# Patient Record
Sex: FEMALE | Race: WHITE | NOT HISPANIC OR LATINO | Employment: OTHER | URBAN - METROPOLITAN AREA
[De-identification: names, ages, dates, MRNs, and addresses within clinical notes are randomized per-mention and may not be internally consistent; named-entity substitution may affect disease eponyms.]

---

## 2019-09-25 ENCOUNTER — TELEPHONE (OUTPATIENT)
Dept: SCHEDULING | Facility: IMAGING CENTER | Age: 59
End: 2019-09-25

## 2019-09-30 ENCOUNTER — OFFICE VISIT (OUTPATIENT)
Dept: MEDICAL GROUP | Facility: MEDICAL CENTER | Age: 59
End: 2019-09-30
Payer: COMMERCIAL

## 2019-09-30 VITALS
BODY MASS INDEX: 34.31 KG/M2 | RESPIRATION RATE: 14 BRPM | TEMPERATURE: 98.2 F | OXYGEN SATURATION: 97 % | DIASTOLIC BLOOD PRESSURE: 82 MMHG | SYSTOLIC BLOOD PRESSURE: 136 MMHG | HEART RATE: 78 BPM | WEIGHT: 201 LBS | HEIGHT: 64 IN

## 2019-09-30 DIAGNOSIS — Z12.31 ENCOUNTER FOR SCREENING MAMMOGRAM FOR BREAST CANCER: ICD-10-CM

## 2019-09-30 DIAGNOSIS — E66.9 OBESITY (BMI 30.0-34.9): ICD-10-CM

## 2019-09-30 DIAGNOSIS — Z11.59 NEED FOR HEPATITIS C SCREENING TEST: ICD-10-CM

## 2019-09-30 DIAGNOSIS — E03.8 OTHER SPECIFIED HYPOTHYROIDISM: ICD-10-CM

## 2019-09-30 DIAGNOSIS — Z23 NEED FOR VACCINATION: ICD-10-CM

## 2019-09-30 DIAGNOSIS — Z13.21 ENCOUNTER FOR VITAMIN DEFICIENCY SCREENING: ICD-10-CM

## 2019-09-30 DIAGNOSIS — Z98.890 HISTORY OF GASTRIC SURGERY: ICD-10-CM

## 2019-09-30 DIAGNOSIS — Z13.220 SCREENING, LIPID: ICD-10-CM

## 2019-09-30 DIAGNOSIS — Z80.3 FAMILY HISTORY OF BREAST CANCER IN SISTER: ICD-10-CM

## 2019-09-30 DIAGNOSIS — E55.9 VITAMIN D DEFICIENCY: ICD-10-CM

## 2019-09-30 PROCEDURE — 99203 OFFICE O/P NEW LOW 30 MIN: CPT | Performed by: NURSE PRACTITIONER

## 2019-09-30 RX ORDER — LEVOTHYROXINE SODIUM 0.12 MG/1
TABLET ORAL
Refills: 0 | COMMUNITY
Start: 2019-08-26 | End: 2019-09-30 | Stop reason: SDUPTHER

## 2019-09-30 RX ORDER — LEVOTHYROXINE SODIUM 0.12 MG/1
TABLET ORAL
Qty: 90 TAB | Refills: 1 | Status: SHIPPED | OUTPATIENT
Start: 2019-09-30 | End: 2019-10-21

## 2019-09-30 SDOH — HEALTH STABILITY: MENTAL HEALTH: HOW MANY STANDARD DRINKS CONTAINING ALCOHOL DO YOU HAVE ON A TYPICAL DAY?: 1 OR 2

## 2019-09-30 ASSESSMENT — PATIENT HEALTH QUESTIONNAIRE - PHQ9: CLINICAL INTERPRETATION OF PHQ2 SCORE: 0

## 2019-09-30 NOTE — LETTER
[facility]  ARTIS Glez.  [address]  [place] [address]  Fax: [phone]   Authorization for Release/Disclosure of   Protected Health Information   Name: BENJAMIN MAYNARD : 1960 SSN: [id number]   Address: [address] [place] [address]   [place] [address] Phone:    [phone] (home)    I authorize the entity listed below to release/disclose the PHI below to:   [facility]/RAYA Glez and RAYA Glez   Provider or Entity Name:  BERTHA SOFIA JALILIE MD - [facility], [address] [address] Phone:  [phone]    Fax:  [phone]    Reason for request: continuity of care   Information to be released:    [  ] LAST COLONOSCOPY,  including any PATH REPORT and follow-up  [  ] LAST FIT/COLOGUARD RESULT [  ] LAST DEXA  [  ] LAST MAMMOGRAM  [  ] LAST PAP  [  ] LAST LABS [  ] RETINA EXAM REPORT  [  ] IMMUNIZATION RECORDS  [X] Release all info: Continuity of Care      [  ] Check here and initial the line next to each item to release ALL health information INCLUDING  _____ Care and treatment for drug and / or alcohol abuse  _____ HIV testing, infection status, or AIDS  _____ Genetic Testing    DATES OF SERVICE OR TIME PERIOD TO BE DISCLOSED: _____________  I understand and acknowledge that:  * This Authorization may be revoked at any time by you in writing, except if your health information has already been used or disclosed.  * Your health information that will be used or disclosed as a result of you signing this authorization could be re-disclosed by the recipient. If this occurs, your re-disclosed health information may no longer be protected by State or Federal laws.  * You may refuse to sign this Authorization. Your refusal will not affect your ability to obtain treatment.  * This Authorization becomes effective upon signing and will  on (date) __________.      If no date is indicated, this  Authorization will  one (1) year from the signature date.    Name: Fatoumata Redding    Signature:   Date:     2019       PLEASE FAX REQUESTED RECORDS BACK TO: [phone]

## 2019-09-30 NOTE — PROGRESS NOTES
"CC: Establish Care (Medications needed)        HPI:     Fatoumata presents today for the following:  Patient here to establish care.  Transfer from [place]  All problems are new to me today  Patient's past medical, family, and social history reviewed and placed in Epic today. Immunizations reviewed. Prescriptions-reviewed and updated PRN.    1. Other specified hypothyroidism  Currently is on 125 mcg dose.  States may be around 4 months ago her dose had been increased slightly because her TSH was \"sluggish\".  She does not remember the lab value when she does not have these with her currently.  She does however have access to them over her electronic records.    2. History of gastric surgery  Initially had a lap band placed and then removed.  Approximately in 2013 she then underwent a gastric bypass.  She is been stable with this.  She has had issues with anemia, low iron and low vitamin D.  She denies that she has a vitamin levels monitored routinely or that she takes vitamins routinely.    3. . Vitamin D deficiency  See above.  Not on any current supplementation over-the-counter or otherwise      9. Obesity (BMI 30.0-34.9)  Status post 2 bariatric surgeries.    Current Outpatient Medications   Medication Sig Dispense Refill   • levothyroxine (SYNTHROID) 125 MCG Tab TK 1 T PO QAM OES 90 Tab 1     No current facility-administered medications for this visit.      Social History     Tobacco Use   • Smoking status: Never Smoker   • Smokeless tobacco: Never Used   Substance Use Topics   • Alcohol use: Yes     Drinks per session: 1 or 2     Comment: 1 glass wine/week   • Drug use: Never     I reviewed patients allergies, problem list and medications today in [facility].    ROS: Any/all pertinent positives listed in the HPI, otherwise all others reviewed are negative today.      /82 (BP Location: Left arm, Patient Position: Sitting, BP Cuff Size: Adult)   Pulse 78   Temp 36.8 °C (98.2 °F) (Temporal)   Resp 14   Ht 1.621 m (5' " "3.8\")   Wt 91.2 kg (201 lb)   SpO2 97%   BMI 34.72 kg/m²     Exam:    Gen: Alert and oriented, No apparent distress. WDWN  Psych: A+Ox3, normal affect and mood  Skin: Warm, dry and intact. Good turgor   No rashes in visible areas.  Eye: Conjunctiva clear, lids normal  ENMT: Lips without lesions, good dentition  Neck: No Lymphadenopathy, Thyromegaly, Bruits.   Trachea midline, no masses  Lungs: Clear to auscultation bilaterally, no rales or rhonchi   Unlabored respiratory effort.   CV: Regular rate and rhythm, S1, S2. No murmurs.   No Edema        Assessment and Plan.   59 y.o. female with the following issues.    1. Other specified hypothyroidism  Sound like she is due for recheck after thyroid adjustment.  Lab ordered.  We are requesting records from her previous PCP  - Obtain Results: Other (see comment); Obtain Results From: Other (see comment)  - TSH; Future    2. History of gastric surgery/Encounter for vitamin deficiency screening/ Vitamin D deficiency  Stable.  We will monitor her levels  - VITAMIN B12; Future  - FERRITIN; Future  - CBC WITH DIFFERENTIAL; Future    3. Need for hepatitis C screening test  Ordered routinely  - HEP C VIRUS ANTIBODY; Future    6. Need for vaccination  Declines flu    7. Encounter for screening mammogram for breast cancer  Ordered for February 2020  - MA-SCREEN MAMMO W/CAD-BILAT; Future    8. Obesity (BMI 30.0-34.9)  Patient is status post 2 bariatric surgeries.  SITY COUNSELING (No Charge): Patient identified as having weight management issue.  Appropriate orders and counseling given.          "

## 2019-10-15 ENCOUNTER — APPOINTMENT (OUTPATIENT)
Dept: LAB | Facility: MEDICAL CENTER | Age: 59
End: 2019-10-15
Attending: NURSE PRACTITIONER
Payer: COMMERCIAL

## 2019-10-21 ENCOUNTER — TELEPHONE (OUTPATIENT)
Dept: MEDICAL GROUP | Facility: MEDICAL CENTER | Age: 59
End: 2019-10-21

## 2019-10-21 DIAGNOSIS — E03.8 OTHER SPECIFIED HYPOTHYROIDISM: ICD-10-CM

## 2019-10-21 DIAGNOSIS — Z98.890 HISTORY OF GASTRIC SURGERY: ICD-10-CM

## 2019-10-21 DIAGNOSIS — Z13.21 ENCOUNTER FOR VITAMIN DEFICIENCY SCREENING: ICD-10-CM

## 2019-10-21 RX ORDER — LEVOTHYROXINE SODIUM 137 UG/1
137 TABLET ORAL
Qty: 90 TAB | Refills: 0 | Status: SHIPPED | OUTPATIENT
Start: 2019-10-21 | End: 2020-01-20

## 2019-10-22 NOTE — TELEPHONE ENCOUNTER
Please make sure patient is on my chart which is copied below    Fatoumata I did receive a copy of your most recent request labs which showed your thyroid is currently out of range.  Your blood cell panel and vitamin levels are all normal.    This likely means we need to increase your current levothyroxine dose.  I see that you are currently on 125 mcg daily.  I will increase to 137 mcg daily.  New prescription will be sent in.  We will need to recheck your thyroid level in 6 to 8 weeks after increasing this dose.  A lab slip will be mailed to you for that to use at [facility].      Levothyroxine 137 mcg sent to pharmacy.  TSH ordered, needs to be mailed to patient as she uses [facility].  Please attach my business card and [facility] codes to that lab order

## 2020-01-20 ENCOUNTER — TELEPHONE (OUTPATIENT)
Dept: MEDICAL GROUP | Facility: MEDICAL CENTER | Age: 60
End: 2020-01-20

## 2020-01-20 DIAGNOSIS — E03.8 OTHER SPECIFIED HYPOTHYROIDISM: ICD-10-CM

## 2020-01-20 RX ORDER — LEVOTHYROXINE SODIUM 137 UG/1
TABLET ORAL
Qty: 90 TAB | Refills: 0 | Status: SHIPPED | OUTPATIENT
Start: 2020-01-20 | End: 2020-02-06

## 2020-01-21 NOTE — TELEPHONE ENCOUNTER
Please call patient.  She is due to recheck her TSH.  Refill of thyroid medication sent, TSH lab pending.

## 2020-02-05 ENCOUNTER — PATIENT MESSAGE (OUTPATIENT)
Dept: MEDICAL GROUP | Facility: MEDICAL CENTER | Age: 60
End: 2020-02-05

## 2020-02-05 ENCOUNTER — TELEPHONE (OUTPATIENT)
Dept: MEDICAL GROUP | Facility: MEDICAL CENTER | Age: 60
End: 2020-02-05

## 2020-02-05 DIAGNOSIS — E03.8 OTHER SPECIFIED HYPOTHYROIDISM: ICD-10-CM

## 2020-02-05 NOTE — TELEPHONE ENCOUNTER
Please call patient  pls confirm she is taking 137 mcg of levothyroxine (and no longer taking 125mcg)  Her thyroid level is improved but not my much.  If she is on the 137 for the last 6+ weeks, then I want to increase further to 150mcg and recheck in 2 months.  Please mail her the lab order as she uses [facility].  TSH ordered, will wait to confirm she is on correct dose for correct amount of time before sending any RX changes    My chart sent as well

## 2020-02-05 NOTE — TELEPHONE ENCOUNTER
Patient viewed lab results & message. Sending mychart to get confirmation of medication dosage she is taking.

## 2020-02-06 RX ORDER — LEVOTHYROXINE SODIUM 0.15 MG/1
150 TABLET ORAL
Qty: 90 TAB | Refills: 0 | Status: SHIPPED | OUTPATIENT
Start: 2020-02-06 | End: 2020-05-05

## 2020-02-19 ENCOUNTER — OFFICE VISIT (OUTPATIENT)
Dept: URGENT CARE | Facility: CLINIC | Age: 60
End: 2020-02-19
Payer: COMMERCIAL

## 2020-02-19 VITALS
DIASTOLIC BLOOD PRESSURE: 100 MMHG | OXYGEN SATURATION: 98 % | TEMPERATURE: 98.8 F | WEIGHT: 206 LBS | HEIGHT: 63 IN | SYSTOLIC BLOOD PRESSURE: 140 MMHG | BODY MASS INDEX: 36.5 KG/M2 | HEART RATE: 92 BPM

## 2020-02-19 DIAGNOSIS — J40 BRONCHITIS: ICD-10-CM

## 2020-02-19 DIAGNOSIS — L28.2 PRURITIC RASH: ICD-10-CM

## 2020-02-19 PROCEDURE — 99203 OFFICE O/P NEW LOW 30 MIN: CPT | Performed by: PHYSICIAN ASSISTANT

## 2020-02-19 RX ORDER — CODEINE PHOSPHATE/GUAIFENESIN 10-100MG/5
5 LIQUID (ML) ORAL
Qty: 50 ML | Refills: 0 | Status: SHIPPED | OUTPATIENT
Start: 2020-02-19 | End: 2020-02-27

## 2020-02-19 RX ORDER — METHYLPREDNISOLONE 4 MG/1
TABLET ORAL
Qty: 21 TAB | Refills: 0 | Status: SHIPPED | OUTPATIENT
Start: 2020-02-19 | End: 2020-02-27

## 2020-02-19 RX ORDER — DIPHENHYDRAMINE HCL 25 MG
25 TABLET ORAL EVERY 6 HOURS PRN
COMMUNITY
End: 2020-02-28

## 2020-02-19 RX ORDER — BENZONATATE 100 MG/1
100 CAPSULE ORAL 3 TIMES DAILY PRN
Qty: 30 CAP | Refills: 0 | Status: SHIPPED | OUTPATIENT
Start: 2020-02-19 | End: 2020-02-27

## 2020-02-19 RX ORDER — HYDROXYZINE HYDROCHLORIDE 25 MG/1
25 TABLET, FILM COATED ORAL 3 TIMES DAILY PRN
Qty: 30 TAB | Refills: 0 | Status: SHIPPED | OUTPATIENT
Start: 2020-02-19 | End: 2020-02-27

## 2020-02-19 ASSESSMENT — ENCOUNTER SYMPTOMS
ABDOMINAL PAIN: 0
EYE PAIN: 0
HEADACHES: 0
COUGH: 1
SORE THROAT: 0
DIARRHEA: 0
NAUSEA: 0
SPUTUM PRODUCTION: 1
DIZZINESS: 0
EYE REDNESS: 0
VOMITING: 0
PALPITATIONS: 0
WHEEZING: 1
SHORTNESS OF BREATH: 0
CHILLS: 0
NECK PAIN: 0
FEVER: 0
MYALGIAS: 1
SINUS PAIN: 0

## 2020-02-19 NOTE — PROGRESS NOTES
Subjective:      Fatoumata Redding is a 60 y.o. female who presents with URI (cough, now have hives all over, taking benadryl, need cough med., itchi all over body)            HPI  60-year-old female presents urgent care with new problem of dry cough, congestion, and mild wheezing onset 4 days ago.  Patient reports she has been taking previously prescribed promethazine and codeine for cough on Sunday and developed a full body rash Monday.  She reports associated itching, denies pain.  She has been taking 50 mg of Benadryl 4 times a day with minimal symptomatic relief.  She denies previous history of reaction to this medication.  She denies new laundry detergent, clothing, household products/chemicals, or known exposure to allergens.    Review of Systems   Constitutional: Positive for malaise/fatigue. Negative for chills and fever.   HENT: Positive for congestion. Negative for ear pain, sinus pain and sore throat.    Eyes: Negative for pain and redness.   Respiratory: Positive for cough, sputum production and wheezing. Negative for shortness of breath.    Cardiovascular: Negative for chest pain and palpitations.   Gastrointestinal: Negative for abdominal pain, diarrhea, nausea and vomiting.   Genitourinary: Negative for dysuria.   Musculoskeletal: Positive for myalgias. Negative for neck pain.   Skin: Positive for itching and rash.   Neurological: Negative for dizziness and headaches.   Endo/Heme/Allergies: Negative for environmental allergies.     Past Medical History:   Diagnosis Date   • Hyperlipidemia     in her 20s   • Hypertension     one meds at one point prior to weight loss surgery   • palpitations     resolved with weight loss surgery   • Thyroid disease      Current Outpatient Medications on File Prior to Visit   Medication Sig Dispense Refill   • diphenhydrAMINE (BENADRYL) 25 MG Tab Take 25 mg by mouth every 6 hours as needed for Sleep.     • levothyroxine (SYNTHROID) 150 MCG Tab Take 1 Tab by mouth Every  "morning on an empty stomach. 90 Tab 0     No current facility-administered medications on file prior to visit.      Allergies   Allergen Reactions   • Tape      Paper tape,other tapes give her rash   • Latex Rash     Latex gloves only-rash  Latex gloves only-rash  Latex gloves only-rash       Social History     Tobacco Use   • Smoking status: Never Smoker   • Smokeless tobacco: Never Used   Substance Use Topics   • Alcohol use: Yes     Drinks per session: 1 or 2     Comment: 1 glass wine/week      Objective:     /100 (BP Location: Left arm, Patient Position: Sitting, BP Cuff Size: Large adult)   Pulse 92   Temp 37.1 °C (98.8 °F) (Temporal)   Ht 1.6 m (5' 3\")   Wt 93.4 kg (206 lb)   SpO2 98%   BMI 36.49 kg/m²      Physical Exam  Vitals signs reviewed.   Constitutional:       General: She is not in acute distress.     Appearance: Normal appearance. She is well-developed. She is not ill-appearing.   HENT:      Head: Normocephalic and atraumatic.      Right Ear: Tympanic membrane and ear canal normal.      Left Ear: Tympanic membrane and ear canal normal.      Nose: Mucosal edema and congestion present.      Mouth/Throat:      Mouth: Mucous membranes are moist.      Pharynx: Oropharynx is clear.   Eyes:      Extraocular Movements: Extraocular movements intact.      Conjunctiva/sclera: Conjunctivae normal.   Neck:      Musculoskeletal: Normal range of motion and neck supple.   Cardiovascular:      Rate and Rhythm: Normal rate and regular rhythm.      Heart sounds: Normal heart sounds.   Pulmonary:      Effort: Pulmonary effort is normal. No respiratory distress.      Breath sounds: Rhonchi present.   Musculoskeletal: Normal range of motion.   Skin:     General: Skin is warm and dry.      Capillary Refill: Capillary refill takes less than 2 seconds.      Findings: No rash.   Neurological:      General: No focal deficit present.      Mental Status: She is alert and oriented to person, place, and time. "   Psychiatric:         Mood and Affect: Mood normal.         Behavior: Behavior normal.         Thought Content: Thought content normal.         Judgment: Judgment normal.                 Assessment/Plan:     1. Bronchitis  benzonatate (TESSALON) 100 MG Cap    guaifenesin-codeine (TUSSI-ORGANIDIN NR) 100-10 MG/5ML syrup   2. Pruritic rash  methylPREDNISolone (MEDROL DOSEPAK) 4 MG Tablet Therapy Pack    hydrOXYzine HCl (ATARAX) 25 MG Tab     Advised patient to discontinue promethazine codeine cough syrup.   Prescribed patient guaifenesin codeine to trial.  Patient advised to stop taking this medication if rash persists or worsens.  Patient denies associated stridor, difficulty breathing, or shortness of breath.  PT should follow up with PCP in 1-2 days for re-evaluation if symptoms have not improved.  Discussed red flags and reasons to return to UC or ED.  Pt and/or family verbalized understanding of diagnosis and follow up instructions and was offered informational handout on diagnosis.  PT discharged.     Your blood pressure is elevated here in Urgent Care. Please monitor your blood pressure over the next several days. If your blood pressure continues to be 120/80 or higher please contact your physician for blood pressure management.

## 2020-02-20 RX ORDER — AZITHROMYCIN 250 MG/1
TABLET, FILM COATED ORAL
Qty: 6 TAB | Refills: 0 | Status: SHIPPED | OUTPATIENT
Start: 2020-02-20 | End: 2020-02-27

## 2020-02-27 ENCOUNTER — OFFICE VISIT (OUTPATIENT)
Dept: URGENT CARE | Facility: CLINIC | Age: 60
End: 2020-02-27
Payer: COMMERCIAL

## 2020-02-27 VITALS
WEIGHT: 206 LBS | SYSTOLIC BLOOD PRESSURE: 102 MMHG | HEIGHT: 63 IN | OXYGEN SATURATION: 96 % | BODY MASS INDEX: 36.5 KG/M2 | TEMPERATURE: 98.5 F | DIASTOLIC BLOOD PRESSURE: 72 MMHG | HEART RATE: 85 BPM | RESPIRATION RATE: 17 BRPM

## 2020-02-27 DIAGNOSIS — T78.40XA ALLERGIC REACTION, INITIAL ENCOUNTER: ICD-10-CM

## 2020-02-27 PROCEDURE — 99214 OFFICE O/P EST MOD 30 MIN: CPT | Performed by: NURSE PRACTITIONER

## 2020-02-27 RX ORDER — DIPHENHYDRAMINE HYDROCHLORIDE 50 MG/ML
50 INJECTION INTRAMUSCULAR; INTRAVENOUS ONCE
Status: DISCONTINUED | OUTPATIENT
Start: 2020-02-27 | End: 2020-02-27

## 2020-02-27 RX ORDER — DIPHENHYDRAMINE HYDROCHLORIDE 50 MG/ML
50 INJECTION INTRAMUSCULAR; INTRAVENOUS ONCE
Status: COMPLETED | OUTPATIENT
Start: 2020-02-27 | End: 2020-02-27

## 2020-02-27 RX ADMIN — DIPHENHYDRAMINE HYDROCHLORIDE 50 MG: 50 INJECTION INTRAMUSCULAR; INTRAVENOUS at 10:17

## 2020-02-27 ASSESSMENT — ENCOUNTER SYMPTOMS
HEADACHES: 0
SORE THROAT: 0
CONSTIPATION: 0
DIZZINESS: 0
FEVER: 0
MYALGIAS: 0
TINGLING: 0
PALPITATIONS: 0
SHORTNESS OF BREATH: 0
WHEEZING: 0
CHILLS: 0
BACK PAIN: 0
ORTHOPNEA: 0
NAUSEA: 0
VOMITING: 0
HEARTBURN: 0
MEMORY LOSS: 0
COUGH: 0
DIARRHEA: 0

## 2020-02-27 NOTE — PROGRESS NOTES
"Subjective:      Fatoumata Redding is a 60 y.o. female who presents with Rash (rashes all over body , medications have not been working for patient , this is a follow up from last visit ) and Ear Fullness (BILATERAL EAR PAIN / ITCHY )            Itchy red bumps and welts started 3 days ago at abdomen and extended to entire body. Sometimes better without any intervention. Worse after hot shower. Mouth, hands, feet and genitalia are spared. No blisters or pustules. Rash is not painful. Not precipitated by sun exposure.  She has just finished a Z-Daryl yesterday.  She does have a subjective history of multiple medication reactions especially to steroids.  No topical or other home treatment tried.   No new lotion, soap, detergent, sheets, towels, topical or oral medicine.   No similarly affected contacts. No pets in household. No known insect infestations in household.    ROS:  No mouth or lip redness, itching, blisters, or swelling  No difficulty swallowing or breathing.  No cough or wheeze.           Review of Systems   Constitutional: Negative for chills and fever.   HENT: Negative for ear pain and sore throat.    Respiratory: Negative for cough, shortness of breath and wheezing.    Cardiovascular: Negative for chest pain, palpitations, orthopnea and leg swelling.   Gastrointestinal: Negative for constipation, diarrhea, heartburn, nausea and vomiting.   Musculoskeletal: Negative for back pain, joint pain and myalgias.   Skin: Positive for itching and rash ( generalized).   Neurological: Negative for dizziness, tingling and headaches.   Psychiatric/Behavioral: Negative for memory loss and suicidal ideas.   All other systems reviewed and are negative.         Objective:     /72 (BP Location: Left arm, Patient Position: Sitting, BP Cuff Size: Adult)   Pulse 85   Temp 36.9 °C (98.5 °F) (Temporal)   Resp 17   Ht 1.6 m (5' 3\")   Wt 93.4 kg (206 lb)   SpO2 96%   BMI 36.49 kg/m²      Physical Exam  Vitals signs " reviewed.   Constitutional:       General: She is not in acute distress.     Appearance: Normal appearance.   HENT:      Head: Normocephalic and atraumatic.      Right Ear: External ear normal.      Left Ear: External ear normal.   Eyes:      Extraocular Movements: Extraocular movements intact.   Neck:      Musculoskeletal: Normal range of motion and neck supple.   Cardiovascular:      Rate and Rhythm: Normal rate and regular rhythm.      Pulses: Normal pulses.      Heart sounds: No friction rub. No gallop.    Pulmonary:      Effort: Pulmonary effort is normal. No respiratory distress.      Breath sounds: Normal breath sounds.   Abdominal:      General: Bowel sounds are normal. There is no distension.      Palpations: Abdomen is soft. There is no mass.   Musculoskeletal: Normal range of motion.         General: No tenderness.      Right lower leg: No edema.      Left lower leg: No edema.   Skin:     General: Skin is warm and dry.      Findings: Rash present. Rash is urticarial.      Comments: Generalized urticarial rash throughout body including face but sparing palms and soles.   Neurological:      Mental Status: She is alert and oriented to person, place, and time.   Psychiatric:         Mood and Affect: Mood normal.                 Assessment/Plan:       1. Allergic reaction, initial encounter  We have discussed that although we cannot confirm with 100% certainty that this is likely a reaction to the Z-Daryl she finished yesterday and may not clear her system for several more days.  She has history of sensitivity to medications.  She has been instructed on emergency room precautions in depth for any lip swelling, shortness of breath or worsening symptoms.  We have given her a Benadryl injection in the office today and she will continue with over-the-counter Benadryl.  She declines any steroid medications as she states she has an allergy to all steroids.  She is going to follow-up with her PCP to discuss further  allergy testing.  All questions answered.  Patient verbalizes understanding and has no additional questions today and is in agreement with treatment plan.  - diphenhydrAMINE (BENADRYL) injection 50 mg

## 2020-02-28 ENCOUNTER — OFFICE VISIT (OUTPATIENT)
Dept: URGENT CARE | Facility: CLINIC | Age: 60
End: 2020-02-28
Payer: COMMERCIAL

## 2020-02-28 VITALS
TEMPERATURE: 97.9 F | HEART RATE: 74 BPM | HEIGHT: 63 IN | DIASTOLIC BLOOD PRESSURE: 76 MMHG | SYSTOLIC BLOOD PRESSURE: 124 MMHG | BODY MASS INDEX: 37.21 KG/M2 | OXYGEN SATURATION: 98 % | WEIGHT: 210 LBS | RESPIRATION RATE: 14 BRPM

## 2020-02-28 DIAGNOSIS — L28.2 PRURITIC RASH: ICD-10-CM

## 2020-02-28 PROCEDURE — 99214 OFFICE O/P EST MOD 30 MIN: CPT | Performed by: PHYSICIAN ASSISTANT

## 2020-02-28 RX ORDER — HYDROXYZINE PAMOATE 25 MG/1
25 CAPSULE ORAL 3 TIMES DAILY PRN
Qty: 30 CAP | Refills: 0 | Status: SHIPPED | OUTPATIENT
Start: 2020-02-28 | End: 2020-03-05

## 2020-02-28 ASSESSMENT — ENCOUNTER SYMPTOMS
CHILLS: 0
SHORTNESS OF BREATH: 0
FEVER: 0
PALPITATIONS: 0

## 2020-02-28 NOTE — PROGRESS NOTES
Subjective:   Fatoumata Redding is a 60 y.o. female who presents today with   Chief Complaint   Patient presents with   • Facial Swelling     facial and hand swelling still not going down   • Rash     rash all over body after taking zpack       Rash   This is a new problem. The current episode started in the past 7 days. The problem has been gradually improving since onset. The rash is diffuse. The rash is characterized by itchiness and redness. Associated with: Z-pack. Pertinent negatives include no fever or shortness of breath. Treatments tried: Benadryl. The treatment provided mild relief. Her past medical history is significant for allergies.     Patient states she noticed some improvement from the Benadryl injection yesterday but states she still has persistent rash especially on her arms and back.  PMH:  has a past medical history of Hyperlipidemia, Hypertension, palpitations, and Thyroid disease. She also has no past medical history of Asthma, Blood transfusion without reported diagnosis, Cancer (HCC), CHF (congestive heart failure) (HCC), Clotting disorder (HCC), COPD (chronic obstructive pulmonary disease) (HCC), Diabetes (HCC), Diabetic neuropathy (HCC), Encounter for long-term (current) use of other medications, Goiter, Heart attack (HCC), Heart murmur, Kidney disease, Pulmonary emphysema (HCC), Seizure (HCC), Stroke (HCC), Tuberculosis, or Urinary tract infection.  MEDS:   Current Outpatient Medications:   •  hydrOXYzine pamoate (VISTARIL) 25 MG Cap, Take 1 Cap by mouth 3 times a day as needed for Itching., Disp: 30 Cap, Rfl: 0  •  levothyroxine (SYNTHROID) 150 MCG Tab, Take 1 Tab by mouth Every morning on an empty stomach., Disp: 90 Tab, Rfl: 0  ALLERGIES:   Allergies   Allergen Reactions   • Azithromycin      Rash   • Tape      Paper tape,other tapes give her rash   • Latex Rash     Latex gloves only-rash  Latex gloves only-rash  Latex gloves only-rash       SURGHX:   Past Surgical History:   Procedure  "Laterality Date   • ABDOMINAL EXPLORATION  2013    gastric bypass   • ABDOMINAL EXPLORATION  2011    lap band   • CHOLECYSTECTOMY     • PRIMARY C SECTION      c/s x 2     SOCHX:  reports that she has never smoked. She has never used smokeless tobacco. She reports current alcohol use. She reports that she does not use drugs.  FH: Reviewed with patient, not pertinent to this visit.       Review of Systems   Constitutional: Negative for chills and fever.   Respiratory: Negative for shortness of breath.    Cardiovascular: Negative for chest pain and palpitations.   Skin: Positive for itching and rash.   All other systems reviewed and are negative.       Objective:   /76 (BP Location: Left arm, Patient Position: Sitting, BP Cuff Size: Large adult)   Pulse 74   Temp 36.6 °C (97.9 °F) (Temporal)   Resp 14   Ht 1.6 m (5' 3\")   Wt 95.3 kg (210 lb)   SpO2 98%   BMI 37.20 kg/m²   Physical Exam  Vitals signs and nursing note reviewed.   Constitutional:       General: She is not in acute distress.     Appearance: Normal appearance. She is well-developed and normal weight. She is not ill-appearing.   HENT:      Head: Normocephalic and atraumatic.      Right Ear: Hearing normal.      Left Ear: Hearing normal.   Eyes:      Pupils: Pupils are equal, round, and reactive to light.   Cardiovascular:      Rate and Rhythm: Normal rate and regular rhythm.      Heart sounds: Normal heart sounds.   Pulmonary:      Effort: Pulmonary effort is normal.      Breath sounds: Normal breath sounds.   Musculoskeletal:      Comments: Normal movement in all 4 extremities   Skin:     General: Skin is warm and dry.             Comments: Excoriated rash on patient's arms bilaterally.  Diffuse erythematous macular rash on patient's arms and back.  Mild swelling and erythema under patient's eyes.   Neurological:      Mental Status: She is alert.      Coordination: Coordination normal.   Psychiatric:         Mood and Affect: Mood is anxious.    "           Assessment/Plan:   Assessment    1. Pruritic rash  - hydrOXYzine pamoate (VISTARIL) 25 MG Cap; Take 1 Cap by mouth 3 times a day as needed for Itching.  Dispense: 30 Cap; Refill: 0  Discussed with patient that if it is in fact allergic reaction to azithromycin it may take up to 2 weeks to completely get out of her system.    Discussed with patient my recommendation that she should start taking steroids at this time to help with rash but she politely declined stating she would not like to start on those and would like to seek an alternative to her rash.  Discussed with patient to only use Vistaril sparingly as needed and not to take it before driving secondary to potential drowsy side effects.  Differential diagnosis, natural history, supportive care, and indications for immediate follow-up discussed.   Patient given instructions and understanding of medications and treatment.    If not improving in 3-5 days, F/U with PCP or return to UC if symptoms worsen.    Patient agreeable to plan.      Please note that this dictation was created using voice recognition software. I have made every reasonable attempt to correct obvious errors, but I expect that there are errors of grammar and possibly content that I did not discover before finalizing the note.    Ananda Rendon PA-C

## 2020-03-04 ENCOUNTER — PATIENT MESSAGE (OUTPATIENT)
Dept: MEDICAL GROUP | Facility: MEDICAL CENTER | Age: 60
End: 2020-03-04

## 2020-03-04 DIAGNOSIS — L29.9 ITCHING: ICD-10-CM

## 2020-03-05 RX ORDER — HYDROXYZINE PAMOATE 25 MG/1
25 CAPSULE ORAL 3 TIMES DAILY PRN
Qty: 90 CAP | Refills: 0 | Status: SHIPPED | OUTPATIENT
Start: 2020-03-05 | End: 2020-03-09

## 2020-03-09 ENCOUNTER — PATIENT MESSAGE (OUTPATIENT)
Dept: MEDICAL GROUP | Facility: MEDICAL CENTER | Age: 60
End: 2020-03-09

## 2020-03-09 ENCOUNTER — OFFICE VISIT (OUTPATIENT)
Dept: MEDICAL GROUP | Facility: MEDICAL CENTER | Age: 60
End: 2020-03-09
Payer: COMMERCIAL

## 2020-03-09 VITALS
RESPIRATION RATE: 16 BRPM | DIASTOLIC BLOOD PRESSURE: 88 MMHG | OXYGEN SATURATION: 97 % | TEMPERATURE: 97.5 F | BODY MASS INDEX: 36.86 KG/M2 | HEART RATE: 80 BPM | HEIGHT: 63 IN | SYSTOLIC BLOOD PRESSURE: 144 MMHG | WEIGHT: 208 LBS

## 2020-03-09 DIAGNOSIS — L29.9 ITCHING: ICD-10-CM

## 2020-03-09 DIAGNOSIS — R21 RASH AND NONSPECIFIC SKIN ERUPTION: ICD-10-CM

## 2020-03-09 PROCEDURE — 99214 OFFICE O/P EST MOD 30 MIN: CPT | Performed by: NURSE PRACTITIONER

## 2020-03-09 RX ORDER — HYDROXYZINE PAMOATE 25 MG/1
25 CAPSULE ORAL 3 TIMES DAILY PRN
Qty: 90 CAP | Refills: 0 | Status: SHIPPED | OUTPATIENT
Start: 2020-03-09 | End: 2020-03-20

## 2020-03-09 RX ORDER — METHYLPREDNISOLONE 4 MG/1
TABLET ORAL
Qty: 21 TAB | Refills: 0 | Status: SHIPPED | OUTPATIENT
Start: 2020-03-09 | End: 2020-03-20

## 2020-03-09 ASSESSMENT — PATIENT HEALTH QUESTIONNAIRE - PHQ9: CLINICAL INTERPRETATION OF PHQ2 SCORE: 0

## 2020-03-09 NOTE — PROGRESS NOTES
"CC: Rash (had a virus for 3 weeks; )        HPI:     Fatoumata presents today for the followin. Rash and nonspecific skin eruption      Current Outpatient Medications   Medication Sig Dispense Refill   • methylPREDNISolone (MEDROL DOSEPAK) 4 MG Tablet Therapy Pack As directed on the packaging label. 21 Tab 0   • hydrocortisone 2.5 % Cream topical cream Apply 1 Application to affected area(s) 2 times a day. To affected areas up to 2 weeks 30 g 1   • levothyroxine (SYNTHROID) 150 MCG Tab Take 1 Tab by mouth Every morning on an empty stomach. 90 Tab 0     No current facility-administered medications for this visit.      Social History     Tobacco Use   • Smoking status: Never Smoker   • Smokeless tobacco: Never Used   Substance Use Topics   • Alcohol use: Yes     Drinks per session: 1 or 2     Comment: 1 glass wine/week   • Drug use: Never     I reviewed patients allergies, problem list and medications today in EP[facility].    ROS: Any/all pertinent positives listed in the HPI, otherwise all others reviewed are negative today.      /88 (BP Location: Left arm, Patient Position: Sitting, BP Cuff Size: Adult)   Pulse 80   Temp 36.4 °C (97.5 °F) (Temporal)   Resp 16   Ht 1.6 m (5' 3\")   Wt 94.3 kg (208 lb)   SpO2 97%   BMI 36.85 kg/m²     Exam:    Gen: Alert and oriented, No apparent distress. WDWN  Psych: A+Ox3, normal affect and mood  Skin: Warm, dry and intact. Good turgor   erythematous macular areas specifically scattered on the lower arms and lower legs.  Also present on her abdomen and her upper and lower back.  These are open however appears more due to scratched surfaces.  There are some areas we can see visible linear scabbing from scratches.  She does not have any areas of jessy erythema, drainage or concerns for secondary bacterial infection  Face appears normal with no noticeable swelling.  Patient states that part has resolved  Eye: Conjunctiva clear, lids normal  ENMT: Lips without lesions, good " dentition  Lungs: Clear to auscultation bilaterally, no rales or rhonchi   Unlabored respiratory effort.   CV: Regular rate and rhythm, S1, S2. No murmurs.   No Edema        Assessment and Plan.   60 y.o. female with the following issues.    1. Rash and nonspecific skin eruption  Low suspicion for bed bugs given onset of symptoms with the start of azithromycin.  This likely points to an allergic reaction (this is already noted in her chart).  Recommend oral steroids and may continue her cortisone cream.  She understands not to put the cortisone cream on her face.  She understands not to use for more than 2 weeks.  If this is not improving I recommend adding oral over-the-counter Pepcid during the daytime and if that still is not working she will notify me and I will place referral to allergy  -OTC benadryl at night, =/- vistaril daytime, oatmeal baths, ice compress to itchy area, vaseline as emoillient  - methylPREDNISolone (MEDROL DOSEPAK) 4 MG Tablet Therapy Pack; As directed on the packaging label.  Dispense: 21 Tab; Refill: 0  - hydrocortisone 2.5 % Cream topical cream; Apply 1 Application to affected area(s) 2 times a day. To affected areas up to 2 weeks  Dispense: 30 g; Refill: 1

## 2020-03-20 ENCOUNTER — APPOINTMENT (OUTPATIENT)
Dept: RADIOLOGY | Facility: MEDICAL CENTER | Age: 60
End: 2020-03-20
Attending: EMERGENCY MEDICINE
Payer: COMMERCIAL

## 2020-03-20 ENCOUNTER — OFFICE VISIT (OUTPATIENT)
Dept: URGENT CARE | Facility: PHYSICIAN GROUP | Age: 60
End: 2020-03-20
Payer: COMMERCIAL

## 2020-03-20 ENCOUNTER — HOSPITAL ENCOUNTER (EMERGENCY)
Facility: MEDICAL CENTER | Age: 60
End: 2020-03-20
Attending: EMERGENCY MEDICINE
Payer: COMMERCIAL

## 2020-03-20 VITALS
HEIGHT: 63 IN | RESPIRATION RATE: 18 BRPM | WEIGHT: 202.8 LBS | SYSTOLIC BLOOD PRESSURE: 130 MMHG | BODY MASS INDEX: 35.93 KG/M2 | OXYGEN SATURATION: 95 % | DIASTOLIC BLOOD PRESSURE: 88 MMHG | TEMPERATURE: 98.7 F | HEART RATE: 96 BPM

## 2020-03-20 VITALS
OXYGEN SATURATION: 94 % | DIASTOLIC BLOOD PRESSURE: 79 MMHG | BODY MASS INDEX: 35.93 KG/M2 | SYSTOLIC BLOOD PRESSURE: 150 MMHG | WEIGHT: 202.82 LBS | TEMPERATURE: 97.8 F | HEART RATE: 91 BPM | RESPIRATION RATE: 18 BRPM

## 2020-03-20 DIAGNOSIS — K57.92 DIVERTICULITIS: ICD-10-CM

## 2020-03-20 DIAGNOSIS — R10.84 GENERALIZED ABDOMINAL PAIN: ICD-10-CM

## 2020-03-20 LAB
ALBUMIN SERPL BCP-MCNC: 4.1 G/DL (ref 3.2–4.9)
ALBUMIN/GLOB SERPL: 1.1 G/DL
ALP SERPL-CCNC: 78 U/L (ref 30–99)
ALT SERPL-CCNC: <5 U/L (ref 2–50)
ANION GAP SERPL CALC-SCNC: 12 MMOL/L (ref 7–16)
APPEARANCE UR: CLEAR
APPEARANCE UR: CLEAR
AST SERPL-CCNC: 10 U/L (ref 12–45)
BACTERIA #/AREA URNS HPF: ABNORMAL /HPF
BASOPHILS # BLD AUTO: 0.6 % (ref 0–1.8)
BASOPHILS # BLD: 0.06 K/UL (ref 0–0.12)
BILIRUB SERPL-MCNC: 0.4 MG/DL (ref 0.1–1.5)
BILIRUB UR QL STRIP.AUTO: NEGATIVE
BILIRUB UR STRIP-MCNC: NEGATIVE MG/DL
BUN SERPL-MCNC: 9 MG/DL (ref 8–22)
CALCIUM SERPL-MCNC: 9.3 MG/DL (ref 8.5–10.5)
CHLORIDE SERPL-SCNC: 103 MMOL/L (ref 96–112)
CO2 SERPL-SCNC: 21 MMOL/L (ref 20–33)
COLOR UR AUTO: YELLOW
COLOR UR: YELLOW
CREAT SERPL-MCNC: 0.5 MG/DL (ref 0.5–1.4)
EOSINOPHIL # BLD AUTO: 0.6 K/UL (ref 0–0.51)
EOSINOPHIL NFR BLD: 5.6 % (ref 0–6.9)
EPI CELLS #/AREA URNS HPF: ABNORMAL /HPF
ERYTHROCYTE [DISTWIDTH] IN BLOOD BY AUTOMATED COUNT: 42.6 FL (ref 35.9–50)
GLOBULIN SER CALC-MCNC: 3.8 G/DL (ref 1.9–3.5)
GLUCOSE SERPL-MCNC: 99 MG/DL (ref 65–99)
GLUCOSE UR STRIP.AUTO-MCNC: NEGATIVE MG/DL
GLUCOSE UR STRIP.AUTO-MCNC: NEGATIVE MG/DL
HCT VFR BLD AUTO: 44.1 % (ref 37–47)
HGB BLD-MCNC: 14.6 G/DL (ref 12–16)
HYALINE CASTS #/AREA URNS LPF: ABNORMAL /LPF
IMM GRANULOCYTES # BLD AUTO: 0.03 K/UL (ref 0–0.11)
IMM GRANULOCYTES NFR BLD AUTO: 0.3 % (ref 0–0.9)
KETONES UR STRIP.AUTO-MCNC: 15 MG/DL
KETONES UR STRIP.AUTO-MCNC: NEGATIVE MG/DL
LEUKOCYTE ESTERASE UR QL STRIP.AUTO: NEGATIVE
LEUKOCYTE ESTERASE UR QL STRIP.AUTO: NEGATIVE
LIPASE SERPL-CCNC: 16 U/L (ref 11–82)
LYMPHOCYTES # BLD AUTO: 2.01 K/UL (ref 1–4.8)
LYMPHOCYTES NFR BLD: 18.8 % (ref 22–41)
MCH RBC QN AUTO: 29.4 PG (ref 27–33)
MCHC RBC AUTO-ENTMCNC: 33.1 G/DL (ref 33.6–35)
MCV RBC AUTO: 88.9 FL (ref 81.4–97.8)
MICRO URNS: ABNORMAL
MONOCYTES # BLD AUTO: 1.11 K/UL (ref 0–0.85)
MONOCYTES NFR BLD AUTO: 10.4 % (ref 0–13.4)
NEUTROPHILS # BLD AUTO: 6.9 K/UL (ref 2–7.15)
NEUTROPHILS NFR BLD: 64.3 % (ref 44–72)
NITRITE UR QL STRIP.AUTO: NEGATIVE
NITRITE UR QL STRIP.AUTO: NEGATIVE
NRBC # BLD AUTO: 0 K/UL
NRBC BLD-RTO: 0 /100 WBC
PH UR STRIP.AUTO: 5 [PH] (ref 5–8)
PH UR STRIP.AUTO: 5.5 [PH] (ref 5–8)
PLATELET # BLD AUTO: 232 K/UL (ref 164–446)
PMV BLD AUTO: 10.2 FL (ref 9–12.9)
POTASSIUM SERPL-SCNC: 4.1 MMOL/L (ref 3.6–5.5)
PROT SERPL-MCNC: 7.9 G/DL (ref 6–8.2)
PROT UR QL STRIP: NEGATIVE MG/DL
PROT UR QL STRIP: NORMAL MG/DL
RBC # BLD AUTO: 4.96 M/UL (ref 4.2–5.4)
RBC # URNS HPF: ABNORMAL /HPF
RBC UR QL AUTO: ABNORMAL
RBC UR QL AUTO: NORMAL
SODIUM SERPL-SCNC: 136 MMOL/L (ref 135–145)
SP GR UR STRIP.AUTO: 1.03
SP GR UR STRIP.AUTO: 1.04
UROBILINOGEN UR STRIP-MCNC: 0.2 MG/DL
UROBILINOGEN UR STRIP.AUTO-MCNC: 0.2 MG/DL
WBC # BLD AUTO: 10.7 K/UL (ref 4.8–10.8)
WBC #/AREA URNS HPF: ABNORMAL /HPF

## 2020-03-20 PROCEDURE — 81001 URINALYSIS AUTO W/SCOPE: CPT

## 2020-03-20 PROCEDURE — 81002 URINALYSIS NONAUTO W/O SCOPE: CPT | Performed by: FAMILY MEDICINE

## 2020-03-20 PROCEDURE — 80053 COMPREHEN METABOLIC PANEL: CPT

## 2020-03-20 PROCEDURE — 700117 HCHG RX CONTRAST REV CODE 255: Performed by: EMERGENCY MEDICINE

## 2020-03-20 PROCEDURE — 85025 COMPLETE CBC W/AUTO DIFF WBC: CPT

## 2020-03-20 PROCEDURE — 83690 ASSAY OF LIPASE: CPT

## 2020-03-20 PROCEDURE — 99284 EMERGENCY DEPT VISIT MOD MDM: CPT

## 2020-03-20 PROCEDURE — A9270 NON-COVERED ITEM OR SERVICE: HCPCS | Performed by: EMERGENCY MEDICINE

## 2020-03-20 PROCEDURE — 99213 OFFICE O/P EST LOW 20 MIN: CPT | Performed by: FAMILY MEDICINE

## 2020-03-20 PROCEDURE — 74177 CT ABD & PELVIS W/CONTRAST: CPT

## 2020-03-20 PROCEDURE — 700102 HCHG RX REV CODE 250 W/ 637 OVERRIDE(OP): Performed by: EMERGENCY MEDICINE

## 2020-03-20 RX ORDER — METRONIDAZOLE 500 MG/1
500 TABLET ORAL ONCE
Status: COMPLETED | OUTPATIENT
Start: 2020-03-20 | End: 2020-03-20

## 2020-03-20 RX ORDER — CIPROFLOXACIN 500 MG/1
500 TABLET, FILM COATED ORAL 2 TIMES DAILY
Qty: 20 TAB | Refills: 0 | Status: SHIPPED | OUTPATIENT
Start: 2020-03-20 | End: 2020-03-30

## 2020-03-20 RX ORDER — CIPROFLOXACIN 500 MG/1
500 TABLET, FILM COATED ORAL ONCE
Status: COMPLETED | OUTPATIENT
Start: 2020-03-20 | End: 2020-03-20

## 2020-03-20 RX ORDER — METRONIDAZOLE 500 MG/1
500 TABLET ORAL 3 TIMES DAILY
Qty: 30 TAB | Refills: 0 | Status: SHIPPED | OUTPATIENT
Start: 2020-03-20 | End: 2020-03-30

## 2020-03-20 RX ADMIN — METRONIDAZOLE 500 MG: 500 TABLET ORAL at 21:15

## 2020-03-20 RX ADMIN — CIPROFLOXACIN HYDROCHLORIDE 500 MG: 500 TABLET, FILM COATED ORAL at 21:15

## 2020-03-20 RX ADMIN — IOHEXOL 100 ML: 350 INJECTION, SOLUTION INTRAVENOUS at 20:08

## 2020-03-20 ASSESSMENT — ENCOUNTER SYMPTOMS
BLOOD IN STOOL: 0
ABDOMINAL PAIN: 1
SORE THROAT: 0
DIARRHEA: 0
SHORTNESS OF BREATH: 0
NAUSEA: 0
CHILLS: 1
FEVER: 0
VOMITING: 0
HEADACHES: 0

## 2020-03-21 NOTE — ED PROVIDER NOTES
"ER Provider Note     Scribed for Hay Weaver M.D. by Anh Martinez. 3/20/2020, 7:42 PM.    Primary Care Provider: RAYA Jamil  Means of Arrival: Walk In   History obtained from: Patient  History limited by: None     CHIEF COMPLAINT  Chief Complaint   Patient presents with   • Abdominal Pain       HPI  Fatoumata Redding is a 60 y.o. female who presents to the Emergency Department accompanied by her  for evaluation of acute onset of lower abdominal pain today. She states she has also had abdominal cramping that resolved yesterday but continues to have gas and a \"tablespoon of diarrhea\", but is having normal stools otherwise. No vomiting or fever. Denies any leg pain. She was seen previously by Urgent Care a few hours ago, and was advised to come to the ED for further evaluation. Urinalysis at the time was negative for urinary tract infection and she denies any dysuria or any other urinary symptoms.  Patient is post menopausal. Patient reports surgical history of gastric bypass 8-10 years ago. She reports she takes Synthroid for hypothyroidism.     REVIEW OF SYSTEMS  See HPI for further details. All other systems are negative.     PAST MEDICAL HISTORY   has a past medical history of Hyperlipidemia, Hypertension, palpitations, and Thyroid disease.    SURGICAL HISTORY   has a past surgical history that includes abdominal exploration (2013); abdominal exploration (2011); cholecystectomy; and primary c section.    SOCIAL HISTORY  Social History     Tobacco Use   • Smoking status: Never Smoker   • Smokeless tobacco: Never Used   Substance Use Topics   • Alcohol use: Yes     Drinks per session: 1 or 2     Comment: 1 glass wine/week   • Drug use: Never      Social History     Substance and Sexual Activity   Drug Use Never       FAMILY HISTORY  Family History   Problem Relation Age of Onset   • Cancer Mother 79        ovarian CA  pt is BRCA 1 and 2 negative   • Cancer Father 74        lung ca; " "smoker   • Stroke Father    • Cancer Sister 55        Brca -metastatic   • Cancer Sister         melanoma face, in remission   • Diabetes Sister    • Diabetes Maternal Grandmother    • Cancer Paternal Grandfather         lung Ca smoker   • Heart Disease Neg Hx        CURRENT MEDICATIONS  Home Medications     Reviewed by Nu Jacobsen R.N. (Registered Nurse) on 03/20/20 at 1907  Med List Status: Complete   Medication Last Dose Status   levothyroxine (SYNTHROID) 150 MCG Tab  Active                ALLERGIES  Allergies   Allergen Reactions   • Azithromycin      Rash   • Prednisone      \"hives and rash\"   • Promethazine      Possible rash, not confirmed   • Tape      Paper tape,other tapes give her rash   • Latex Rash     Latex gloves only-rash  Latex gloves only-rash  Latex gloves only-rash         PHYSICAL EXAM  VITAL SIGNS: BP (!) 202/112   Pulse 95   Temp 36.6 °C (97.8 °F) (Temporal)   Resp 18   Wt 92 kg (202 lb 13.2 oz)   SpO2 97%   BMI 35.93 kg/m²      Constitutional: Alert in no apparent distress.  HENT: No signs of trauma, Bilateral external ears normal, Nose normal.   Eyes: Pupils are equal and reactive, Conjunctiva normal, Non-icteric.   Neck: Normal range of motion, No tenderness, Supple, No stridor.   Lymphatic: No lymphadenopathy noted.   Cardiovascular: Regular rate and rhythm, no murmurs.   Thorax & Lungs: Normal breath sounds, No respiratory distress, No wheezing, No chest tenderness.   Abdomen: Mild left upper quadrant tenderness to palpation. Bowel sounds normal, Soft, No masses, No pulsatile masses. No peritoneal signs.  Skin: Warm, Dry, No erythema, No rash.   Back: No bony tenderness, No CVA tenderness.   Extremities: Intact distal pulses, No edema, No tenderness, No cyanosis.  Musculoskeletal: Good range of motion in all major joints. No tenderness to palpation or major deformities noted.   Neurologic: Alert , Normal motor function, Normal sensory function, No focal deficits noted. "   Psychiatric: Affect normal, Judgment normal, Mood normal.     DIAGNOSTIC STUDIES / PROCEDURES    LABS  Labs Reviewed   CBC WITH DIFFERENTIAL - Abnormal; Notable for the following components:       Result Value    MCHC 33.1 (*)     Lymphocytes 18.80 (*)     Monos (Absolute) 1.11 (*)     Eos (Absolute) 0.60 (*)     All other components within normal limits   COMP METABOLIC PANEL - Abnormal; Notable for the following components:    AST(SGOT) 10 (*)     Globulin 3.8 (*)     All other components within normal limits   URINALYSIS,CULTURE IF INDICATED - Abnormal; Notable for the following components:    Ketones 15 (*)     Occult Blood Trace (*)     All other components within normal limits   URINE MICROSCOPIC (W/UA) - Abnormal; Notable for the following components:    RBC 5-10 (*)     Bacteria Few (*)     All other components within normal limits   LIPASE   ESTIMATED GFR     All labs reviewed by me.    RADIOLOGY  CT-ABDOMEN-PELVIS WITH   Final Result      1.  Acute sigmoid diverticulitis. No diverticular abscess.   2.  Incidental 1.8 cm solid lesion in the right hepatic lobe, indeterminate. It needs to be further evaluated with contrast-enhanced liver MRI.         The radiologist's interpretation of all radiological studies have been reviewed by me.    COURSE & MEDICAL DECISION MAKING  Pertinent Labs & Imaging studies reviewed. (See chart for details)    This is a 60 y.o. female that presents with abdominal pain that is in the left upper area of her abdomen.  This could resent diverticulitis versus appendicitis versus cholecystitis versus pancreatitis.  I will get the below labs and then reassess..     7:42 PM - Patient seen and examined at bedside. Patient presents accompanied by her  for evaluation of acute onset of lower abdominal pain today. She was seen previously at Urgent Care and states urinalysis was negative for urinary tract infection at the time. Discussed plan of care with patient. I informed them  that labs and imaging will be ordered to evaluate symptoms. Patient is understanding and agreeable with plan.  Ordered CT abdomen pelvis with IV contrast, CBC with diff, CMP, Lipase, and Urinalysis.      8:45 PM - The patient will be treated with ciprofloxcin 500 mg and Flagyl 500 mg.     9:25 PM - Patient was reevaluated at bedside. Discussed lab  and radiology  results with the patient and informed them that results show diverticulitis, and incidental finding of a lesion in the right hepatic lobe that needs to be further evaluated with contrast MRI. Patient will be provided prescription for ciprofloxacin and Flagyl. She is advised to follow up with her PCP and  the patient will return for new or worsening symptoms and is stable at the time of discharge.    Patient was found to have negative lipase.  She was found to have no significant electrolyte derangements.  She does have acute sigmoid diverticulitis.  I will treat her with antibiotics and sent home with strict return precautions and follow-up.    The patient is referred to a primary physician for blood pressure management, diabetic screening, and for all other preventative health concerns.      DISPOSITION:  Patient will be discharged home in stable condition.    FOLLOW UP:  Susu Carson A.P.R.N.  [address]  [address]  [facility] [address]  [phone]    In 2 days        OUTPATIENT MEDICATIONS:  New Prescriptions    CIPROFLOXACIN (CIPRO) 500 MG TAB    Take 1 Tab by mouth 2 times a day for 10 days.    METRONIDAZOLE (FLAGYL) 500 MG TAB    Take 1 Tab by mouth 3 times a day for 10 days.        FINAL IMPRESSION  1. Diverticulitis          Anh WYLIE (Rosa), am scribing for, and in the presence of, Hay Weaver M.D..    Electronically signed by: Anh Martinez (Rosa), 3/20/2020    Hay WYLIE M.D. personally performed the services described in this documentation, as scribed by Anh Martinez in my presence, and it is both accurate  and complete.  C    The note accurately reflects work and decisions made by me.  Hay Weaver M.D.  3/20/2020  11:53 PM

## 2020-03-21 NOTE — ED NOTES
Patient verbalized understanding of discharge instructions, provided with discharge paperwork, gait steady, ambulated independently to CHRISTELLE duran.

## 2020-03-21 NOTE — PROGRESS NOTES
"Subjective:     Fatoumata Redding is a 60 y.o. female who presents for Abdominal Pain (bloated, cramping, tired, chills,  x1 day )    HPI  Pt presents for evaluation of a new problem   Pt with abdominal cramping and increased gas for the pst 24 hours   Pt called her physician 2 days ago because she wasn't feeling well and was advised to go on a bland diet   No diarrhea, no blood in stool, and nausea or vomiting   Pain is constantly present, slowly worsening, and nothing makes it better  Continues to have fairly normal bowel movements  Decreased appetite  Feeling very fatigued, and having chills and    Review of Systems   Constitutional: Positive for chills and malaise/fatigue. Negative for fever.   HENT: Negative for sore throat.    Respiratory: Negative for shortness of breath.    Cardiovascular: Negative for chest pain.   Gastrointestinal: Positive for abdominal pain. Negative for blood in stool, diarrhea, nausea and vomiting.   Skin: Negative for rash.   Neurological: Negative for headaches.       PMH:  has a past medical history of Hyperlipidemia, Hypertension, palpitations, and Thyroid disease. She also has no past medical history of Asthma, Blood transfusion without reported diagnosis, Cancer (HCC), CHF (congestive heart failure) (HCC), Clotting disorder (HCC), COPD (chronic obstructive pulmonary disease) (HCC), Diabetes (HCC), Diabetic neuropathy (HCC), Encounter for long-term (current) use of other medications, Goiter, Heart attack (HCC), Heart murmur, Kidney disease, Pulmonary emphysema (HCC), Seizure (HCC), Stroke (HCC), Tuberculosis, or Urinary tract infection.  MEDS:   Current Outpatient Medications:   •  levothyroxine (SYNTHROID) 150 MCG Tab, Take 1 Tab by mouth Every morning on an empty stomach., Disp: 90 Tab, Rfl: 0  ALLERGIES:   Allergies   Allergen Reactions   • Azithromycin      Rash   • Prednisone      \"hives and rash\"   • Promethazine      Possible rash, not confirmed   • Tape      Paper tape,other " "tapes give her rash   • Latex Rash     Latex gloves only-rash  Latex gloves only-rash  Latex gloves only-rash       SURGHX:   Past Surgical History:   Procedure Laterality Date   • ABDOMINAL EXPLORATION  2013    gastric bypass   • ABDOMINAL EXPLORATION  2011    lap band   • CHOLECYSTECTOMY     • PRIMARY C SECTION      c/s x 2     SOCHX:  reports that she has never smoked. She has never used smokeless tobacco. She reports current alcohol use. She reports that she does not use drugs.  FH: Family history was reviewed, not contributing to acute illness     Objective:   /88 (BP Location: Right arm, Patient Position: Sitting, BP Cuff Size: Large adult)   Pulse 96   Temp 37.1 °C (98.7 °F) (Temporal)   Resp 18   Ht 1.6 m (5' 3\")   Wt 92 kg (202 lb 12.8 oz)   SpO2 95%   BMI 35.92 kg/m²     Physical Exam  Constitutional:       General: She is not in acute distress.     Appearance: She is well-developed. She is not diaphoretic.   HENT:      Head: Normocephalic and atraumatic.   Abdominal:      General: Abdomen is flat. Bowel sounds are normal. There is no distension.      Palpations: Abdomen is soft.      Tenderness: There is no right CVA tenderness or left CVA tenderness.      Comments: Markedly tender to palpation in right lower quadrant with moderate tenderness to palpation in the left lower quadrant and left upper quadrant.  Positive rebound tenderness, no guarding   Skin:     General: Skin is warm and dry.      Findings: No erythema.   Neurological:      Mental Status: She is alert and oriented to person, place, and time.      Sensory: No sensory deficit.   Psychiatric:         Mood and Affect: Mood normal.         Behavior: Behavior normal.         Thought Content: Thought content normal.         Judgment: Judgment normal.         Assessment/Plan:   Assessment    1. Generalized abdominal pain  - POCT Urinalysis    Patient with generalized abdominal pain.  She is markedly tender to palpation in the right " lower quadrant.  Has more tenderness than expected for typical gastroenteritis or other benign problem.  Advised that she is a person who I would like to get labs and imaging to further evaluate.  Unfortunately too late in the day to get any outpatient testing and do not feel it is safe to send her home with the significant abdominal pain.  Advised her to be seen in ER and she is agreeable to this.  Patient driven to ER by her .

## 2020-03-21 NOTE — ED TRIAGE NOTES
Pt sent here from Urgent Care with c/c of lower abd pain for the past 24 hours. Pt denies vomiting, denies fever, denies diarrhea. Pt stating +gas.

## 2020-03-23 ENCOUNTER — OFFICE VISIT (OUTPATIENT)
Dept: MEDICAL GROUP | Facility: MEDICAL CENTER | Age: 60
End: 2020-03-23
Payer: COMMERCIAL

## 2020-03-23 VITALS
OXYGEN SATURATION: 99 % | DIASTOLIC BLOOD PRESSURE: 74 MMHG | HEIGHT: 63 IN | SYSTOLIC BLOOD PRESSURE: 132 MMHG | WEIGHT: 207 LBS | HEART RATE: 68 BPM | TEMPERATURE: 96.9 F | BODY MASS INDEX: 36.68 KG/M2

## 2020-03-23 DIAGNOSIS — R19.7 DIARRHEA, UNSPECIFIED TYPE: ICD-10-CM

## 2020-03-23 DIAGNOSIS — R31.9 HEMATURIA, UNSPECIFIED TYPE: ICD-10-CM

## 2020-03-23 DIAGNOSIS — K57.32 SIGMOID DIVERTICULITIS: ICD-10-CM

## 2020-03-23 DIAGNOSIS — K76.9 LIVER LESION: ICD-10-CM

## 2020-03-23 DIAGNOSIS — R10.30 LOWER ABDOMINAL PAIN: ICD-10-CM

## 2020-03-23 PROCEDURE — 99214 OFFICE O/P EST MOD 30 MIN: CPT | Performed by: NURSE PRACTITIONER

## 2020-03-23 ASSESSMENT — FIBROSIS 4 INDEX: FIB4 SCORE: 1.18

## 2020-03-23 NOTE — LETTER
[facility]  NEO JamilPRafaelRBEST.  [address]  [place] [address]  Fax: [phone]   Authorization for Release/Disclosure of   Protected Health Information   Name: BENJAMIN MAYNARD : 1960 SSN: [id number]   Address: [address] [place] [address]   [place] [address] Phone:    [phone] (home)    I authorize the entity listed below to release/disclose the PHI below to:   [facility]"[employer] [facility]/MINH Jamil.P.RKARLEE and RAYA Jamil   Provider or Entity Name:  SHELIA Collazo   Address   [place], [place]  Phone:  [phone]    Fax:  [phone]    Reason for request: continuity of care   Information to be released:    [  ] LAST COLONOSCOPY,  including any PATH REPORT and follow-up  [  ] LAST FIT/COLOGUARD RESULT [  ] LAST DEXA  [  ] LAST MAMMOGRAM  [  ] LAST PAP  [  ] LAST LABS [  ] RETINA EXAM REPORT  [  ] IMMUNIZATION RECORDS  [  ] Release all info      [  ] Check here and initial the line next to each item to release ALL health information INCLUDING  _____ Care and treatment for drug and / or alcohol abuse  _____ HIV testing, infection status, or AIDS  _____ Genetic Testing    DATES OF SERVICE OR TIME PERIOD TO BE DISCLOSED: _____________  I understand and acknowledge that:  * This Authorization may be revoked at any time by you in writing, except if your health information has already been used or disclosed.  * Your health information that will be used or disclosed as a result of you signing this authorization could be re-disclosed by the recipient. If this occurs, your re-disclosed health information may no longer be protected by State or Federal laws.  * You may refuse to sign this Authorization. Your refusal will not affect your ability to obtain treatment.  * This Authorization becomes effective upon signing and will  on (date) __________.      If no date is indicated, this Authorization will  one (1) year from the signature date.    Name: Benjamin  Miladis    Signature:   Date:     3/23/2020       PLEASE FAX REQUESTED RECORDS BACK TO: [phone]

## 2020-03-23 NOTE — PROGRESS NOTES
CC: ED Follow-Up        HPI:     Fatoumata presents today for the followin. Diarrhea, unspecified type/. Lower abdominal pain/ Sigmoid diverticulitis  Here following up from the ER on 3/20.  Had message me the day prior to some diarrhea and lower abdominal pain.  States the pain worsened significantly in the next day she went to urgent care and was referred to the ER.  She states the pain was radiating over her entire abdomen.  Had intermittent diarrhea normal stools.  No black or bloody stools.  No associated fever.  CT scan done in the ER did show sigmoid diverticulitis.  She was sent home on Flagyl and Cipro which she is tolerating well.  She denies any fevers to her knowledge since she is been home.  Has had a few episodes of intermittent chills.  States stools are returning to normal and now only has pain in the right lower quadrant if she pushes down on it.    Started antibiotics on Saturday evening.  Has had 3 doses so far  4. Liver lesion  Incidental finding of a 1.8 cm liver lesion.  Radiology recommendation was for an MRI liver protocol    5. Hematuria, unspecified type  States that she did notice some gross hematuria at home this was 1 of the reasons that she went to the urgent care originally.  She did not have any urinary symptoms and her urinalysis was largely within normal limits.    Current Outpatient Medications   Medication Sig Dispense Refill   • ciprofloxacin (CIPRO) 500 MG Tab Take 1 Tab by mouth 2 times a day for 10 days. 20 Tab 0   • metroNIDAZOLE (FLAGYL) 500 MG Tab Take 1 Tab by mouth 3 times a day for 10 days. 30 Tab 0   • levothyroxine (SYNTHROID) 150 MCG Tab Take 1 Tab by mouth Every morning on an empty stomach. 90 Tab 0     No current facility-administered medications for this visit.      Social History     Tobacco Use   • Smoking status: Never Smoker   • Smokeless tobacco: Never Used   Substance Use Topics   • Alcohol use: Yes     Drinks per session: 1 or 2     Comment: 1 glass  "wine/week   • Drug use: Never     I reviewed patients allergies, problem list and medications today in [facility].    ROS: Any/all pertinent positives listed in the HPI, otherwise all others reviewed are negative today.      /74 (BP Location: Left arm, Patient Position: Sitting, BP Cuff Size: Large adult)   Pulse 68   Temp 36.1 °C (96.9 °F) (Temporal)   Ht 1.6 m (5' 3\")   Wt 93.9 kg (207 lb)   SpO2 99%   BMI 36.67 kg/m²     Exam:    Gen: Alert and oriented, No apparent distress. WDWN  Psych: A+Ox3, normal affect and mood  Skin: Warm, dry and intact. Good turgor   No rashes in visible areas.  Eye: Conjunctiva clear, lids normal  ENMT: Lips without lesions, good dentition  Lungs: Clear to auscultation bilaterally, no rales or rhonchi   Unlabored respiratory effort.   CV: Regular rate and rhythm, S1, S2. No murmurs.   No Edema  Abd: Soft non tender, non distended. Normal active bowel sounds.    No Hepatosplenomegaly, No pulsatile masses.         Assessment and Plan.   60 y.o. female with the following issues.    1. Diarrhea, unspecified type/ Lower abdominal pain/Sigmoid diverticulitis  Stable.  Improving well with almost complete resolution of discomfort.  She will finish her antibiotics.  We discussed a bland low fiber diet and she was given a handout on this.  We discussed that possibly could happen again in the future given the diverticular always there.  Week or more after her symptoms have completely resolved she can slowly start adding fiber into her diet.  She was given info on high-fiber.  She had a colonoscopy less than 5 years ago done in [facility] and we will try and get a copy of that on the record  Patient is afebrile today with complaints of sometimes having chills at home.  Recommend checking with a thermometer to verify no fever.  Patient verbalized understanding.    4. Liver lesion  Imaging ordered.  Reviewed she already had qualifying labs done in ER however if she waits 4 to 6 weeks " to get this scheduled she may need to repeat it and so a nonfasting BM P is ordered  - MR-ABDOMEN-WITH & W/O; Future  - Basic Metabolic Panel; Future    5. Hematuria, unspecified type  We will recheck in a few weeks  - URINALYSIS,CULTURE IF INDICATED; Future

## 2020-05-05 ENCOUNTER — APPOINTMENT (OUTPATIENT)
Dept: RADIOLOGY | Facility: MEDICAL CENTER | Age: 60
End: 2020-05-05
Attending: NURSE PRACTITIONER
Payer: COMMERCIAL

## 2020-05-05 RX ORDER — LEVOTHYROXINE SODIUM 0.15 MG/1
TABLET ORAL
Qty: 90 TAB | Refills: 2 | Status: SHIPPED | OUTPATIENT
Start: 2020-05-05 | End: 2020-10-28 | Stop reason: SDUPTHER

## 2020-05-12 ENCOUNTER — PATIENT MESSAGE (OUTPATIENT)
Dept: MEDICAL GROUP | Facility: MEDICAL CENTER | Age: 60
End: 2020-05-12

## 2020-05-12 DIAGNOSIS — R10.30 LOWER ABDOMINAL PAIN: ICD-10-CM

## 2020-06-02 ENCOUNTER — PATIENT MESSAGE (OUTPATIENT)
Dept: MEDICAL GROUP | Facility: MEDICAL CENTER | Age: 60
End: 2020-06-02

## 2020-06-02 DIAGNOSIS — K76.9 LIVER LESION: ICD-10-CM

## 2020-06-02 RX ORDER — DIAZEPAM 5 MG/1
5 TABLET ORAL
Qty: 2 TAB | Refills: 0 | Status: SHIPPED | OUTPATIENT
Start: 2020-06-02 | End: 2020-06-02

## 2020-06-02 NOTE — PATIENT COMMUNICATION
reviewed from state pharmacy database-Medications found to be medically necessary/appropriate.  Valium sent to pharm on file via [facility].    Instructed to cancel [facility] MRI, schedule with more open MRI and aden diagnostic.  Will fax order to [facility].  Will need to get the labs done prior and ensure they get the results.

## 2020-06-02 NOTE — TELEPHONE ENCOUNTER
"From: Fatoumata Pachecosharaamilcar  To: RAYA Jamil  Sent: 6/2/2020 4:10 PM PDT  Subject: Non-Urgent Medical Question    Thank you so much how I am understanding it the open MRI is a bigger tube not necessarily open on top correct? The appt person explained to me it holds up to 500lbs ([facility]) so it sounds that it would be wider not as closed correct? The predose of Valium would only make me loopy, she also said that they have goggles to watch a movie to ease my mind off of it. So sorry for the many questions please refresh my mind what exactly are we looking for of the liver/kidneys should I be alarmed of something is it because of my intestines problems?   I think I will go with my second option of the bigger MRI with a Valium      ----- Message -----   From:RAYA Jamil   Sent:6/2/2020 2:55 PM PDT    To:Fatoumata Redding   Subject:RE: Non-Urgent Medical Question    Thai Ham,    Thanks for getting back to me. We have 2 options, will go with whatever you feel more comfortable with. We can hold off on any imaging and consult with a liver specialist (gastroenterologist) in terms of they feel different imaging would be adequate. Typically the MRI is good to see the liver better than a CT which is why that is preferred. I totally understand the claustrophobia however. The second option would be to pursue with an MRI–you could use the \"more open version\" that they have at [facility]–however we could predose you with some Valium to help ease some of the claustrophobia symptoms. Either those options are perfectly acceptable and please let me know which one you would prefer.      ----- Message -----   From:Fatoumata Redding   Sent:6/2/2020 10:49 AM PDT   To:RAYA Jamil   Subject:Non-Urgent Medical Question    Thai Cristobal  I spoke to the appt person @ [facility] and they do not have an open MRI it was a CT scan that I had done in the ER in March, so would that work to see the kidneys and " liver? I also called the [facility] and they do not have an open MRI, they have a MRI that holds up to 500lbs ([facility]) she said it is a bit bigger she also said that she used that one she also has the same thing I have. My question is would a CT scan show the same thing ? Can we do a CT scan?  Thank you

## 2020-08-22 ENCOUNTER — PATIENT MESSAGE (OUTPATIENT)
Dept: MEDICAL GROUP | Facility: MEDICAL CENTER | Age: 60
End: 2020-08-22

## 2020-08-24 ENCOUNTER — PATIENT MESSAGE (OUTPATIENT)
Dept: MEDICAL GROUP | Facility: MEDICAL CENTER | Age: 60
End: 2020-08-24

## 2020-08-25 ENCOUNTER — PATIENT MESSAGE (OUTPATIENT)
Dept: MEDICAL GROUP | Facility: MEDICAL CENTER | Age: 60
End: 2020-08-25

## 2020-08-25 NOTE — TELEPHONE ENCOUNTER
From: Fatoumata Redding  To: RAYA Saul  Sent: 8/25/2020 10:28 AM PDT  Subject: Non-Urgent Medical Question    Okay thank you so much sorry but one more question is Susu herzog in this office and will you be in her place?      ----- Message -----   From:RAYA Saul   Sent:8/25/2020 10:09 AM PDT   To:Fatoumata Redding   Subject:RE: Non-Urgent Medical Question    Yes, I saw your labs and your current medication but there is a discrepancy. Your chart shows 150mcg levothyroxine, not 125mcg.   Regardless, you do need to drop your dose and the easiest way to do that is to have you take 1/2 tablet one day a week, repeat labs in 6 weeks. If this doesn't work for you we can drop down the daily dose to 100mcg and recheck labs.       ----- Message -----   From:Fatoumata Redding   Sent:8/24/2020 4:09 PM PDT   To:RAYA Saul   Subject:Non-Urgent Medical Question    So I am taking 125MCG so you are saying take 1/2 a pill only on Sundays and the remaining of the days take a full pill? So is that why I feel light headed and feel week even the my activity level is high I walk 3/4 miles a day and bike 6/9 miles. I take on day off.      ----- Message -----   From:RAYA Saul   Sent:8/24/2020 12:32 PM PDT   To:Fatoumata Redding   Subject:RE: Non-Urgent Medical Question    Hi Fatoumata,     Thryoid can be a little confusing because it's a negative feed back system.   Low number means over functioning and this is from too much medication.   I would recommend that you take 1/2 tablet on Sundays and repeat labs in 6 weeks. Please send us a message after you have been on the adjusted dose for about 4 weeks and we can order those labs for you.     CARLINE Torres   Family Medicine   Covering for RAYA Jamil       ----- Message -----   From:Fatoumata Redding   Sent:8/24/2020 9:05 AM PDT   To:RAYA Jamil   Subject:RE: Non-Urgent Medical Question    Good morning    I took it where you sent it at [facility] If you can please get back to me I have already refilled my prescription maybe I can double my dosage?  Also is Susu no longer at this medical center? did she relocate to another medical office?  Thank you      ----- Message -----   From:Tong Rowell M.D.   Sent:8/24/2020 8:50 AM PDT   To:Fatoumata Pachecomarcelo   Subject:RE: Non-Urgent Medical Question    I do not see the thyroid lab results in your chart. Could you send us a copy or let us know where you did them so that we can request them? I will be able to help adjust your thyroid medicine once we see those results.  --Dr. Rowell (Susu is currently out of the office)        ----- Message -----   From:Fatoumata Miladis   Sent:8/22/2020 8:56 AM PDT   To:NEO JamilPRafaelRRafaelNRafael   Subject:Non-Urgent Medical Question    Good morning Susu  Not sure if you will be in the office on Monday but I just got my test results back and I was reading my thyroid it is very bad (0.19) OMG so should i take 2 pills to lower it, I just got a refill on my new prescription?  Thank you so much

## 2020-08-25 NOTE — TELEPHONE ENCOUNTER
From: Fatoumata Redding  To: RAYA Saul  Sent: 8/24/2020 4:09 PM PDT  Subject: Non-Urgent Medical Question    So I am taking 125MCG so you are saying take 1/2 a pill only on Sundays and the remaining of the days take a full pill? So is that why I feel light headed and feel week even the my activity level is high I walk 3/4 miles a day and bike 6/9 miles. I take on day off.      ----- Message -----   From:RAYA Saul   Sent:8/24/2020 12:32 PM PDT   To:Fatoumata Redding   Subject:RE: Non-Urgent Medical Question    Hi Jazzmine Ham can be a little confusing because it's a negative feed back system.   Low number means over functioning and this is from too much medication.   I would recommend that you take 1/2 tablet on Sundays and repeat labs in 6 weeks. Please send us a message after you have been on the adjusted dose for about 4 weeks and we can order those labs for you.     KIMBERLY Torres-C   Family Medicine   Covering for RAYA Jamil       ----- Message -----   From:Fatoumata Redding   Sent:8/24/2020 9:05 AM PDT   To:RAYA Jamil   Subject:RE: Non-Urgent Medical Question    Good morning   I took it where you sent it at [facility] If you can please get back to me I have already refilled my prescription maybe I can double my dosage?  Also is Susu no longer at this medical center? did she relocate to another medical office?  Thank you      ----- Message -----   From:Tong Rowell M.D.   Sent:8/24/2020 8:50 AM PDT   To:Fatoumata Redding   Subject:RE: Non-Urgent Medical Question    I do not see the thyroid lab results in your chart. Could you send us a copy or let us know where you did them so that we can request them? I will be able to help adjust your thyroid medicine once we see those results.  --Dr. Rowell (Susu is currently out of the office)        ----- Message -----   From:Fatoumata Redding   Sent:8/22/2020 8:56 AM  PDT   To:RAYA Jamil   Subject:Non-Urgent Medical Question    Good morning Susu  Not sure if you will be in the office on Monday but I just got my test results back and I was reading my thyroid it is very bad (0.19) OMG so should i take 2 pills to lower it, I just got a refill on my new prescription?  Thank you so much

## 2020-10-12 DIAGNOSIS — E03.9 IDIOPATHIC HYPOTHYROIDISM: ICD-10-CM

## 2020-10-13 ENCOUNTER — DOCUMENTATION (OUTPATIENT)
Dept: MEDICAL GROUP | Facility: MEDICAL CENTER | Age: 60
End: 2020-10-13

## 2020-10-16 NOTE — ADDENDUM NOTE
Addended by: VIRGINIA DOWNEY on: 5/12/2020 06:46 AM     Modules accepted: Orders     Pt is due for several vaccinations. And f/u visit with me on med  Please help her book quick visit . Ok to add to end of day prn .

## 2020-10-23 SDOH — ECONOMIC STABILITY: HOUSING INSECURITY

## 2020-10-23 SDOH — ECONOMIC STABILITY: INCOME INSECURITY: IN THE LAST 12 MONTHS, WAS THERE A TIME WHEN YOU WERE NOT ABLE TO PAY THE MORTGAGE OR RENT ON TIME?: NO

## 2020-10-23 SDOH — ECONOMIC STABILITY: HOUSING INSECURITY
IN THE LAST 12 MONTHS, WAS THERE A TIME WHEN YOU DID NOT HAVE A STEADY PLACE TO SLEEP OR SLEPT IN A SHELTER (INCLUDING NOW)?: NO

## 2020-10-23 SDOH — ECONOMIC STABILITY: TRANSPORTATION INSECURITY
IN THE PAST 12 MONTHS, HAS LACK OF RELIABLE TRANSPORTATION KEPT YOU FROM MEDICAL APPOINTMENTS, MEETINGS, WORK OR FROM GETTING THINGS NEEDED FOR DAILY LIVING?: NO

## 2020-10-23 SDOH — HEALTH STABILITY: PHYSICAL HEALTH: ON AVERAGE, HOW MANY MINUTES DO YOU ENGAGE IN EXERCISE AT THIS LEVEL?: 100 MINUTES

## 2020-10-23 SDOH — HEALTH STABILITY: MENTAL HEALTH
STRESS IS WHEN SOMEONE FEELS TENSE, NERVOUS, ANXIOUS, OR CAN'T SLEEP AT NIGHT BECAUSE THEIR MIND IS TROUBLED. HOW STRESSED ARE YOU?: NOT AT ALL

## 2020-10-23 SDOH — ECONOMIC STABILITY: TRANSPORTATION INSECURITY
IN THE PAST 12 MONTHS, HAS THE LACK OF TRANSPORTATION KEPT YOU FROM MEDICAL APPOINTMENTS OR FROM GETTING MEDICATIONS?: NO

## 2020-10-23 SDOH — HEALTH STABILITY: PHYSICAL HEALTH: ON AVERAGE, HOW MANY DAYS PER WEEK DO YOU ENGAGE IN MODERATE TO STRENUOUS EXERCISE (LIKE A BRISK WALK)?: 6 DAYS

## 2020-10-23 ASSESSMENT — SOCIAL DETERMINANTS OF HEALTH (SDOH)
HOW OFTEN DO YOU HAVE A DRINK CONTAINING ALCOHOL: 2-4 TIMES A MONTH
HOW OFTEN DO YOU ATTEND CHURCH OR RELIGIOUS SERVICES?: DECLINE
WITHIN THE PAST 12 MONTHS, THE FOOD YOU BOUGHT JUST DIDN'T LAST AND YOU DIDN'T HAVE MONEY TO GET MORE: NEVER TRUE
HOW OFTEN DO YOU ATTENT MEETINGS OF THE CLUB OR ORGANIZATION YOU BELONG TO?: NEVER
HOW OFTEN DO YOU GET TOGETHER WITH FRIENDS OR RELATIVES?: ONCE A WEEK
IN A TYPICAL WEEK, HOW MANY TIMES DO YOU TALK ON THE PHONE WITH FAMILY, FRIENDS, OR NEIGHBORS?: MORE THAN THREE TIMES A WEEK
WITHIN THE PAST 12 MONTHS, YOU WORRIED THAT YOUR FOOD WOULD RUN OUT BEFORE YOU GOT THE MONEY TO BUY MORE: NEVER TRUE
HOW OFTEN DO YOU HAVE SIX OR MORE DRINKS ON ONE OCCASION: LESS THAN MONTHLY
HOW MANY DRINKS CONTAINING ALCOHOL DO YOU HAVE ON A TYPICAL DAY WHEN YOU ARE DRINKING: 1 OR 2
HOW HARD IS IT FOR YOU TO PAY FOR THE VERY BASICS LIKE FOOD, HOUSING, MEDICAL CARE, AND HEATING?: NOT HARD AT ALL
DO YOU BELONG TO ANY CLUBS OR ORGANIZATIONS SUCH AS CHURCH GROUPS UNIONS, FRATERNAL OR ATHLETIC GROUPS, OR SCHOOL GROUPS?: NO

## 2020-10-28 ENCOUNTER — OFFICE VISIT (OUTPATIENT)
Dept: MEDICAL GROUP | Facility: MEDICAL CENTER | Age: 60
End: 2020-10-28
Payer: COMMERCIAL

## 2020-10-28 VITALS
HEART RATE: 88 BPM | BODY MASS INDEX: 37.56 KG/M2 | OXYGEN SATURATION: 99 % | SYSTOLIC BLOOD PRESSURE: 126 MMHG | WEIGHT: 212 LBS | RESPIRATION RATE: 16 BRPM | HEIGHT: 63 IN | DIASTOLIC BLOOD PRESSURE: 78 MMHG | TEMPERATURE: 98.8 F

## 2020-10-28 DIAGNOSIS — E03.9 IDIOPATHIC HYPOTHYROIDISM: ICD-10-CM

## 2020-10-28 DIAGNOSIS — R16.0 LIVER MASS, RIGHT LOBE: ICD-10-CM

## 2020-10-28 DIAGNOSIS — Z87.19 HISTORY OF DIVERTICULITIS OF COLON: ICD-10-CM

## 2020-10-28 DIAGNOSIS — K21.9 GASTROESOPHAGEAL REFLUX DISEASE, UNSPECIFIED WHETHER ESOPHAGITIS PRESENT: ICD-10-CM

## 2020-10-28 DIAGNOSIS — Z12.39 SCREENING BREAST EXAMINATION: ICD-10-CM

## 2020-10-28 PROCEDURE — 99214 OFFICE O/P EST MOD 30 MIN: CPT | Performed by: FAMILY MEDICINE

## 2020-10-28 RX ORDER — LEVOTHYROXINE SODIUM 0.15 MG/1
150 TABLET ORAL
Qty: 90 TAB | Refills: 3 | Status: SHIPPED | OUTPATIENT
Start: 2020-10-28 | End: 2022-01-20 | Stop reason: SDUPTHER

## 2020-10-28 ASSESSMENT — FIBROSIS 4 INDEX: FIB4 SCORE: 1.18

## 2020-10-28 NOTE — PROGRESS NOTES
"Chief Complaint   Patient presents with   • Hypothyroidism       Subjective:     HPI:   Fatoumata Redding presents today with the followin. Idiopathic hypothyroidism  Patient reports good energy level on the medication. Patient denies insomnia, tremor or change in appetite.  Patient is taking the medication on an empty stomach in the morning and waiting at least 30 minutes before eating.  Last TSH in just a few days ago was at [facility].    2. Screening breast examination  Mammogram order discussed and placed    3. History of diverticulitis of colon  She is eating a high fiber diet.  She is walking daily.  Denies abdominal pain.  Denies blood in the stool.    4. Gastroesophageal reflux disease, unspecified whether esophagitis present  Gets reflux, uses ginger ale or baking soda as needed.  Some epigastric pain over the last 24 hours.      5. Liver mass, right lobe  Needs follow up ultrasound, 1.8 cm solid liver lesion on CT.  - US-ABDOMEN LTD (SOFT TISSUE); Future      Patient Active Problem List    Diagnosis Date Noted   • Other specified hypothyroidism 2019     Priority: Medium   • Essential hypertension, benign 2011     Priority: Medium   • History of gastric surgery 2019     Priority: Low   • Vitamin D deficiency 2019     Priority: Low   • Family history of breast cancer in sister 2019     Priority: Low   • Esophageal reflux 2011     Priority: Low   • Morbid obesity (HCC) 2011     Priority: Low   • Sleep apnea 2011     Priority: Low       Current medicines (including changes today)  Current Outpatient Medications   Medication Sig Dispense Refill   • levothyroxine (SYNTHROID) 150 MCG Tab Take 1 Tab by mouth Every morning on an empty stomach. 90 Tab 3     No current facility-administered medications for this visit.        Allergies   Allergen Reactions   • Azithromycin      Rash   • Prednisone      \"hives and rash\"   • Promethazine      Possible rash, not confirmed " "  • Tape      Paper tape,other tapes give her rash   • Latex Rash     Latex gloves only-rash  Latex gloves only-rash  Latex gloves only-rash         ROS: As per HPI       Objective:     /78   Pulse 88   Temp 37.1 °C (98.8 °F)   Resp 16   Ht 1.6 m (5' 3\")   Wt 96.2 kg (212 lb)   SpO2 99%  Body mass index is 37.55 kg/m².    Physical Exam:  Constitutional: Well-developed and well-nourished. Not diaphoretic. No distress. Lucid and fluent.  Patient, physician and staff all wearing masks.  Skin: Skin is warm and dry. No rash noted.  Head: Atraumatic without lesions.  Eyes: Conjunctivae and extraocular motions are normal. Pupils are equal, round, and reactive to light. No scleral icterus.   Neck: Supple, trachea midline. No thyromegaly present. No cervical or supraclavicular lymphadenopathy. No JVD or carotid bruits appreciated  Cardiovascular: Regular rate and rhythm.  Normal S1, S2 without murmur appreciated.  Chest: Effort normal. Clear to auscultation throughout. No adventitious sounds.   Abdomen: Soft, epigastrium tender, without distention. Active bowel sounds in all four quadrants. No rebound, guarding, masses or hepatosplenomegaly.  Extremities: No cyanosis, clubbing, erythema, nor edema.   Neurological: Alert and oriented x 3.   Psychiatric:  Behavior, mood, and affect are appropriate.    CT in summer showed a 1.8 cm solid right lobe liver mass.       Assessment and Plan:     60 y.o. female with the following issues:    1. Idiopathic hypothyroidism  levothyroxine (SYNTHROID) 150 MCG Tab   2. Screening breast examination  MA-SCREENING MAMMO BILAT W/TOMOSYNTHESIS W/CAD   3. History of diverticulitis of colon     4. Gastroesophageal reflux disease, unspecified whether esophagitis present     5. Liver mass, right lobe  US-ABDOMEN LTD (SOFT TISSUE)         Followup: Return in about 6 months (around 4/28/2021), or if symptoms worsen or fail to improve.  "

## 2020-12-11 ENCOUNTER — HOSPITAL ENCOUNTER (OUTPATIENT)
Dept: RADIOLOGY | Facility: MEDICAL CENTER | Age: 60
End: 2020-12-11
Attending: FAMILY MEDICINE
Payer: COMMERCIAL

## 2020-12-11 DIAGNOSIS — Z12.39 SCREENING BREAST EXAMINATION: ICD-10-CM

## 2020-12-11 PROCEDURE — 77067 SCR MAMMO BI INCL CAD: CPT

## 2020-12-12 DIAGNOSIS — R16.0 LIVER MASS, RIGHT LOBE: ICD-10-CM

## 2020-12-12 DIAGNOSIS — K76.9 LIVER DISEASE: ICD-10-CM

## 2020-12-14 DIAGNOSIS — R16.0 LIVER MASS, RIGHT LOBE: ICD-10-CM

## 2021-01-11 ENCOUNTER — HOSPITAL ENCOUNTER (OUTPATIENT)
Dept: RADIOLOGY | Facility: MEDICAL CENTER | Age: 61
End: 2021-01-11
Payer: COMMERCIAL

## 2021-01-18 ENCOUNTER — HOSPITAL ENCOUNTER (OUTPATIENT)
Dept: RADIOLOGY | Facility: MEDICAL CENTER | Age: 61
End: 2021-01-18
Attending: FAMILY MEDICINE
Payer: COMMERCIAL

## 2021-01-18 DIAGNOSIS — R16.0 LIVER MASS, RIGHT LOBE: ICD-10-CM

## 2021-01-18 DIAGNOSIS — K76.9 LIVER DISEASE: ICD-10-CM

## 2021-01-18 PROCEDURE — 74183 MRI ABD W/O CNTR FLWD CNTR: CPT

## 2021-01-18 PROCEDURE — 700117 HCHG RX CONTRAST REV CODE 255: Performed by: FAMILY MEDICINE

## 2021-01-18 PROCEDURE — A9576 INJ PROHANCE MULTIPACK: HCPCS | Performed by: FAMILY MEDICINE

## 2021-01-18 RX ADMIN — GADOTERIDOL 20 ML: 279.3 INJECTION, SOLUTION INTRAVENOUS at 16:23

## 2021-03-15 DIAGNOSIS — Z23 NEED FOR VACCINATION: ICD-10-CM

## 2021-04-13 ENCOUNTER — IMMUNIZATION (OUTPATIENT)
Dept: FAMILY PLANNING/WOMEN'S HEALTH CLINIC | Facility: IMMUNIZATION CENTER | Age: 61
End: 2021-04-13
Attending: INTERNAL MEDICINE
Payer: COMMERCIAL

## 2021-04-13 DIAGNOSIS — Z23 NEED FOR VACCINATION: ICD-10-CM

## 2021-04-13 DIAGNOSIS — Z23 ENCOUNTER FOR VACCINATION: Primary | ICD-10-CM

## 2021-04-13 PROCEDURE — 0001A PFIZER SARS-COV-2 VACCINE: CPT

## 2021-04-13 PROCEDURE — 91300 PFIZER SARS-COV-2 VACCINE: CPT

## 2021-05-02 DIAGNOSIS — E03.9 IDIOPATHIC HYPOTHYROIDISM: ICD-10-CM

## 2021-05-02 DIAGNOSIS — K21.9 GASTROESOPHAGEAL REFLUX DISEASE, UNSPECIFIED WHETHER ESOPHAGITIS PRESENT: ICD-10-CM

## 2021-05-02 DIAGNOSIS — R16.0 LIVER MASS, RIGHT LOBE: ICD-10-CM

## 2021-05-02 DIAGNOSIS — K76.9 LIVER DISEASE: ICD-10-CM

## 2021-05-06 ENCOUNTER — IMMUNIZATION (OUTPATIENT)
Dept: FAMILY PLANNING/WOMEN'S HEALTH CLINIC | Facility: IMMUNIZATION CENTER | Age: 61
End: 2021-05-06
Payer: COMMERCIAL

## 2021-05-06 DIAGNOSIS — Z23 ENCOUNTER FOR VACCINATION: Primary | ICD-10-CM

## 2021-05-06 PROCEDURE — 0002A PFIZER SARS-COV-2 VACCINE: CPT | Performed by: INTERNAL MEDICINE

## 2021-05-06 PROCEDURE — 91300 PFIZER SARS-COV-2 VACCINE: CPT | Performed by: INTERNAL MEDICINE

## 2022-01-20 ENCOUNTER — TELEMEDICINE (OUTPATIENT)
Dept: MEDICAL GROUP | Facility: MEDICAL CENTER | Age: 62
End: 2022-01-20
Payer: COMMERCIAL

## 2022-01-20 VITALS
DIASTOLIC BLOOD PRESSURE: 62 MMHG | OXYGEN SATURATION: 96 % | HEIGHT: 62 IN | WEIGHT: 195.77 LBS | HEART RATE: 62 BPM | SYSTOLIC BLOOD PRESSURE: 128 MMHG | TEMPERATURE: 98.2 F | BODY MASS INDEX: 36.03 KG/M2

## 2022-01-20 DIAGNOSIS — I10 ESSENTIAL HYPERTENSION: ICD-10-CM

## 2022-01-20 DIAGNOSIS — K21.9 GASTROESOPHAGEAL REFLUX DISEASE WITHOUT ESOPHAGITIS: ICD-10-CM

## 2022-01-20 DIAGNOSIS — E03.9 IDIOPATHIC HYPOTHYROIDISM: ICD-10-CM

## 2022-01-20 DIAGNOSIS — Z87.898 HISTORY OF PALPITATIONS: ICD-10-CM

## 2022-01-20 DIAGNOSIS — Z98.890 HISTORY OF GASTRIC SURGERY: ICD-10-CM

## 2022-01-20 DIAGNOSIS — E78.5 DYSLIPIDEMIA: ICD-10-CM

## 2022-01-20 DIAGNOSIS — E03.8 OTHER SPECIFIED HYPOTHYROIDISM: ICD-10-CM

## 2022-01-20 DIAGNOSIS — Z12.39 SCREENING BREAST EXAMINATION: ICD-10-CM

## 2022-01-20 PROBLEM — R00.2 PALPITATIONS: Status: ACTIVE | Noted: 2021-10-18

## 2022-01-20 PROBLEM — R06.02 SHORTNESS OF BREATH: Status: RESOLVED | Noted: 2021-10-18 | Resolved: 2022-01-20

## 2022-01-20 PROBLEM — R06.02 SHORTNESS OF BREATH: Status: ACTIVE | Noted: 2021-10-18

## 2022-01-20 PROBLEM — R00.2 PALPITATIONS: Status: RESOLVED | Noted: 2021-10-18 | Resolved: 2022-01-20

## 2022-01-20 PROCEDURE — 99214 OFFICE O/P EST MOD 30 MIN: CPT | Mod: 95 | Performed by: FAMILY MEDICINE

## 2022-01-20 RX ORDER — LEVOTHYROXINE SODIUM 0.15 MG/1
150 TABLET ORAL
COMMUNITY
End: 2022-01-20

## 2022-01-20 RX ORDER — LOSARTAN POTASSIUM 25 MG/1
25 TABLET ORAL DAILY
COMMUNITY
Start: 2021-11-12 | End: 2022-03-29

## 2022-01-20 RX ORDER — ROSUVASTATIN CALCIUM 10 MG/1
10 TABLET, COATED ORAL
COMMUNITY
Start: 2021-11-30 | End: 2022-01-20

## 2022-01-20 RX ORDER — ROSUVASTATIN CALCIUM 10 MG/1
10 TABLET, COATED ORAL DAILY
COMMUNITY
End: 2022-01-20 | Stop reason: SDUPTHER

## 2022-01-20 RX ORDER — LISINOPRIL 5 MG/1
5 TABLET ORAL
COMMUNITY
Start: 2021-12-05 | End: 2022-01-20

## 2022-01-20 RX ORDER — LOSARTAN POTASSIUM 25 MG/1
TABLET ORAL
COMMUNITY
End: 2022-01-20

## 2022-01-20 RX ORDER — LEVOTHYROXINE SODIUM 0.15 MG/1
150 TABLET ORAL
Qty: 90 TABLET | Refills: 3 | Status: SHIPPED | OUTPATIENT
Start: 2022-01-20 | End: 2022-07-13

## 2022-01-20 RX ORDER — ROSUVASTATIN CALCIUM 10 MG/1
10 TABLET, COATED ORAL DAILY
Qty: 90 TABLET | Refills: 3 | Status: SHIPPED | OUTPATIENT
Start: 2022-01-20 | End: 2023-02-21

## 2022-01-20 RX ORDER — LOSARTAN POTASSIUM 25 MG/1
TABLET ORAL
COMMUNITY
Start: 2022-01-12 | End: 2022-01-20

## 2022-01-20 ASSESSMENT — PATIENT HEALTH QUESTIONNAIRE - PHQ9: CLINICAL INTERPRETATION OF PHQ2 SCORE: 0

## 2022-01-20 ASSESSMENT — FIBROSIS 4 INDEX: FIB4 SCORE: 1.219149622735426766

## 2022-01-20 NOTE — PROGRESS NOTES
Virtual Visit: Established Patient   This visit was conducted via Zoom  using secure and encrypted videoconferencing technology. The patient was in a private location in the state of [place].    The patient's identity was confirmed and verbal consent was obtained for this virtual visit.      CC: Establish care                                                                                                                                      HPI:   Fatoumata presents today with the following.      Establish care.  She was a patient of our practice long ago and now has changed insurance and is coming back to [facility].    1. Other specified hypothyroidism  Patient reports good energy level on the medication. Patient denies insomnia, tremor or change in appetite.  Patient is taking the medication on an empty stomach in the morning and waiting at least 30 minutes before eating.  Last TSH 4 months ago is reportedly normal.  Follow-up lab order discussed and placed.    2. Essential hypertension  HTN - Chronic condition stable. Currently taking all meds as directed.   She is not taking baby aspirin daily.   She is monitoring BP at home.  Blood pressure today is very good.  Patient states she is running generally in that range.  Denies symptoms low BP: light-headed, tunnel-vision, unusual fatigue.   Denies symptoms high BP:pounding headache, visual changes, palpitations, flushed face.   Denies medicine side effects: unusual fatigue, slow heartbeat, foot/leg swelling, cough.  Follow-up lab orders discussed and placed.    3. Dyslipidemia  Patient denies chest pain, chest pressure, palpitations or exertional shortness of breath. Patient denies muscle aches or muscle weakness from the rosuvastatin medication. Patient is a never smoker. Patient takes no aspirin daily. Patient has no history of myocardial infarction, stroke or PVD.  The problem is clinically stable.    4. Gastroesophageal reflux disease without esophagitis  Patient  "does have occasional reflux symptoms but is not taking anything on a regular basis anymore.  She does use over-the-counter Pepcid as needed.  Denies any dysphagia, hematemesis, blood in the stool or change in bowel pattern.    5. History of palpitations  She recently had an episode of palpitations.  This resolved and was perhaps related to anxiety.  She had a work-up with her cardiologist.    6. History of gastric surgery  Patient had gastric surgery in 2011 and 2013.  She also had a past cholecystectomy.  She is currently doing well.  She states she does not have daily GI symptoms.    7. Screening breast examination  Mammogram order discussed and placed          Patient Active Problem List    Diagnosis Date Noted   • Other specified hypothyroidism 09/30/2019   • History of gastric surgery 09/30/2019   • Vitamin D deficiency 09/30/2019   • Family history of breast cancer in sister 09/30/2019   • Esophageal reflux 08/17/2011   • Essential hypertension 08/17/2011       Current Outpatient Medications   Medication Sig Dispense Refill   • losartan (COZAAR) 25 MG Tab Take 25 mg by mouth every day.     • rosuvastatin (CRESTOR) 10 MG Tab Take 1 Tablet by mouth every day. 90 Tablet 3   • levothyroxine (SYNTHROID) 150 MCG Tab Take 1 Tablet by mouth every morning on an empty stomach. 90 Tablet 3     No current facility-administered medications for this visit.         Allergies as of 01/20/2022 - Reviewed 01/20/2022   Allergen Reaction Noted   • Prednisone  03/09/2020   • Promethazine  03/09/2020   • Tape  05/27/2015   • Latex Rash 05/27/2015        ROS:  Denies, chest pain, Shortness of breath, Edema.  Denies fever or chills.  Denies abdominal problems or unusual fatigue.    /62   Pulse 62   Temp 36.8 °C (98.2 °F) (Temporal)   Ht 1.575 m (5' 2\")   Wt 88.8 kg (195 lb 12.3 oz)   SpO2 96%   BMI 35.81 kg/m²       Physical Exam:  Constitutional: Alert, no distress, well-groomed.  Skin: No rashes in visible areas.  Eye: " Round. Conjunctiva clear, No icterus.   ENMT: Lips pink without lesions, good dentition, moist mucous membranes. Phonation normal.  Neck: No masses, no thyromegaly. Moves freely without pain.  CV: Pulse as reported by patient  Respiratory: Unlabored respiratory effort, no cough or audible wheeze  Psych: Alert and oriented x3, normal affect and mood.          Assessment and Plan.   62 y.o. female with the following issues.    1. Other specified hypothyroidism  Patient states she had thyroid testing a little bit over 4 months ago that was in the normal range.  Follow-up orders are discussed and I asked that she complete this in a few months.  Patient feels the current thyroid dose is right for her.  - TSH WITH REFLEX TO FT4; Future  - THYROID PEROXIDASE  (TPO) AB; Future  - levothyroxine (SYNTHROID) 150 MCG Tab; Take 1 Tablet by mouth every morning on an empty stomach.  Dispense: 90 Tablet; Refill: 3    2. Essential hypertension  Follow-up lab orders discussed and placed.  She is currently stable on her losartan.  This is prescribed by her cardiologist.    3. Dyslipidemia  Follow-up orders discussed and placed, Crestor renewed.  - Comp Metabolic Panel; Future  - Lipid Profile; Future  - rosuvastatin (CRESTOR) 10 MG Tab; Take 1 Tablet by mouth every day.  Dispense: 90 Tablet; Refill: 3    4. Gastroesophageal reflux disease without esophagitis  Follow-up order discussed and placed  - CBC WITHOUT DIFFERENTIAL; Future    5. History of palpitations  Follow-up order discussed and placed  - TSH WITH REFLEX TO FT4; Future    6. History of gastric surgery  Stable, was in 2011 and 2013.    7. Screening breast examination  Mammogram order discussed and placed  - MA-SCREENING MAMMO BILAT W/TOMOSYNTHESIS W/CAD; Future      Recheck 6 months, sooner as needed

## 2022-03-14 DIAGNOSIS — R68.81 EARLY SATIETY: ICD-10-CM

## 2022-03-14 DIAGNOSIS — R10.13 EPIGASTRIC DISCOMFORT: ICD-10-CM

## 2022-03-14 NOTE — PROGRESS NOTES
Patient is no longer getting the loose urgent stools but she still has early satiety and significant abdominal discomfort with eating.  Referral placed to gastroenterology.

## 2022-03-18 ENCOUNTER — HOSPITAL ENCOUNTER (OUTPATIENT)
Dept: LAB | Facility: MEDICAL CENTER | Age: 62
End: 2022-03-18
Attending: FAMILY MEDICINE
Payer: COMMERCIAL

## 2022-03-18 DIAGNOSIS — E03.8 OTHER SPECIFIED HYPOTHYROIDISM: ICD-10-CM

## 2022-03-18 DIAGNOSIS — Z87.898 HISTORY OF PALPITATIONS: ICD-10-CM

## 2022-03-18 DIAGNOSIS — I10 ESSENTIAL HYPERTENSION: ICD-10-CM

## 2022-03-18 DIAGNOSIS — K21.9 GASTROESOPHAGEAL REFLUX DISEASE WITHOUT ESOPHAGITIS: ICD-10-CM

## 2022-03-18 DIAGNOSIS — E78.5 DYSLIPIDEMIA: ICD-10-CM

## 2022-03-18 LAB
ALBUMIN SERPL BCP-MCNC: 4.2 G/DL (ref 3.2–4.9)
ALBUMIN/GLOB SERPL: 1.7 G/DL
ALP SERPL-CCNC: 72 U/L (ref 30–99)
ALT SERPL-CCNC: 17 U/L (ref 2–50)
ANION GAP SERPL CALC-SCNC: 10 MMOL/L (ref 7–16)
AST SERPL-CCNC: 17 U/L (ref 12–45)
BILIRUB SERPL-MCNC: 0.3 MG/DL (ref 0.1–1.5)
BUN SERPL-MCNC: 10 MG/DL (ref 8–22)
CALCIUM SERPL-MCNC: 9.2 MG/DL (ref 8.5–10.5)
CHLORIDE SERPL-SCNC: 104 MMOL/L (ref 96–112)
CHOLEST SERPL-MCNC: 129 MG/DL (ref 100–199)
CO2 SERPL-SCNC: 26 MMOL/L (ref 20–33)
CREAT SERPL-MCNC: 0.58 MG/DL (ref 0.5–1.4)
ERYTHROCYTE [DISTWIDTH] IN BLOOD BY AUTOMATED COUNT: 41.2 FL (ref 35.9–50)
FASTING STATUS PATIENT QL REPORTED: NORMAL
GFR SERPLBLD CREATININE-BSD FMLA CKD-EPI: 103 ML/MIN/1.73 M 2
GLOBULIN SER CALC-MCNC: 2.5 G/DL (ref 1.9–3.5)
GLUCOSE SERPL-MCNC: 79 MG/DL (ref 65–99)
HCT VFR BLD AUTO: 43.4 % (ref 37–47)
HDLC SERPL-MCNC: 65 MG/DL
HGB BLD-MCNC: 14 G/DL (ref 12–16)
LDLC SERPL CALC-MCNC: 53 MG/DL
MCH RBC QN AUTO: 28.9 PG (ref 27–33)
MCHC RBC AUTO-ENTMCNC: 32.3 G/DL (ref 33.6–35)
MCV RBC AUTO: 89.5 FL (ref 81.4–97.8)
PLATELET # BLD AUTO: 245 K/UL (ref 164–446)
PMV BLD AUTO: 10.8 FL (ref 9–12.9)
POTASSIUM SERPL-SCNC: 4.5 MMOL/L (ref 3.6–5.5)
PROT SERPL-MCNC: 6.7 G/DL (ref 6–8.2)
RBC # BLD AUTO: 4.85 M/UL (ref 4.2–5.4)
SODIUM SERPL-SCNC: 140 MMOL/L (ref 135–145)
T4 FREE SERPL-MCNC: 1.45 NG/DL (ref 0.93–1.7)
THYROPEROXIDASE AB SERPL-ACNC: 64 IU/ML (ref 0–9)
TRIGL SERPL-MCNC: 56 MG/DL (ref 0–149)
TSH SERPL DL<=0.005 MIU/L-ACNC: 0.34 UIU/ML (ref 0.38–5.33)
WBC # BLD AUTO: 5.4 K/UL (ref 4.8–10.8)

## 2022-03-18 PROCEDURE — 84439 ASSAY OF FREE THYROXINE: CPT

## 2022-03-18 PROCEDURE — 86376 MICROSOMAL ANTIBODY EACH: CPT

## 2022-03-18 PROCEDURE — 80061 LIPID PANEL: CPT

## 2022-03-18 PROCEDURE — 84443 ASSAY THYROID STIM HORMONE: CPT

## 2022-03-18 PROCEDURE — 80053 COMPREHEN METABOLIC PANEL: CPT

## 2022-03-18 PROCEDURE — 85027 COMPLETE CBC AUTOMATED: CPT

## 2022-03-18 PROCEDURE — 36415 COLL VENOUS BLD VENIPUNCTURE: CPT

## 2022-03-24 ENCOUNTER — APPOINTMENT (OUTPATIENT)
Dept: RADIOLOGY | Facility: MEDICAL CENTER | Age: 62
End: 2022-03-24
Attending: EMERGENCY MEDICINE
Payer: COMMERCIAL

## 2022-03-24 ENCOUNTER — HOSPITAL ENCOUNTER (OUTPATIENT)
Facility: MEDICAL CENTER | Age: 62
End: 2022-03-25
Attending: EMERGENCY MEDICINE | Admitting: INTERNAL MEDICINE
Payer: COMMERCIAL

## 2022-03-24 DIAGNOSIS — R29.810 FACIAL DROOP: ICD-10-CM

## 2022-03-24 DIAGNOSIS — G45.9 TIA (TRANSIENT ISCHEMIC ATTACK): ICD-10-CM

## 2022-03-24 PROBLEM — E03.9 HYPOTHYROID: Status: ACTIVE | Noted: 2019-09-30

## 2022-03-24 LAB
ABO + RH BLD: NORMAL
ABO GROUP BLD: NORMAL
ALBUMIN SERPL BCP-MCNC: 4.5 G/DL (ref 3.2–4.9)
ALBUMIN/GLOB SERPL: 1.6 G/DL
ALP SERPL-CCNC: 73 U/L (ref 30–99)
ALT SERPL-CCNC: 15 U/L (ref 2–50)
ANION GAP SERPL CALC-SCNC: 10 MMOL/L (ref 7–16)
APTT PPP: 31.7 SEC (ref 24.7–36)
AST SERPL-CCNC: 19 U/L (ref 12–45)
BASOPHILS # BLD AUTO: 0.9 % (ref 0–1.8)
BASOPHILS # BLD: 0.05 K/UL (ref 0–0.12)
BILIRUB SERPL-MCNC: 0.4 MG/DL (ref 0.1–1.5)
BLD GP AB SCN SERPL QL: NORMAL
BUN SERPL-MCNC: 10 MG/DL (ref 8–22)
CALCIUM SERPL-MCNC: 9.4 MG/DL (ref 8.4–10.2)
CHLORIDE SERPL-SCNC: 106 MMOL/L (ref 96–112)
CO2 SERPL-SCNC: 26 MMOL/L (ref 20–33)
CREAT SERPL-MCNC: 0.55 MG/DL (ref 0.5–1.4)
EKG IMPRESSION: NORMAL
EOSINOPHIL # BLD AUTO: 0.25 K/UL (ref 0–0.51)
EOSINOPHIL NFR BLD: 4.3 % (ref 0–6.9)
ERYTHROCYTE [DISTWIDTH] IN BLOOD BY AUTOMATED COUNT: 40.8 FL (ref 35.9–50)
EST. AVERAGE GLUCOSE BLD GHB EST-MCNC: 108 MG/DL
GFR SERPLBLD CREATININE-BSD FMLA CKD-EPI: 105 ML/MIN/1.73 M 2
GLOBULIN SER CALC-MCNC: 2.9 G/DL (ref 1.9–3.5)
GLUCOSE BLD STRIP.AUTO-MCNC: 84 MG/DL (ref 65–99)
GLUCOSE SERPL-MCNC: 88 MG/DL (ref 65–99)
HBA1C MFR BLD: 5.4 % (ref 4–5.6)
HCT VFR BLD AUTO: 46.2 % (ref 37–47)
HGB BLD-MCNC: 15.1 G/DL (ref 12–16)
IMM GRANULOCYTES # BLD AUTO: 0.02 K/UL (ref 0–0.11)
IMM GRANULOCYTES NFR BLD AUTO: 0.3 % (ref 0–0.9)
INR PPP: 1 (ref 0.87–1.13)
LYMPHOCYTES # BLD AUTO: 1.73 K/UL (ref 1–4.8)
LYMPHOCYTES NFR BLD: 29.7 % (ref 22–41)
MCH RBC QN AUTO: 28.7 PG (ref 27–33)
MCHC RBC AUTO-ENTMCNC: 32.7 G/DL (ref 33.6–35)
MCV RBC AUTO: 87.8 FL (ref 81.4–97.8)
MONOCYTES # BLD AUTO: 0.55 K/UL (ref 0–0.85)
MONOCYTES NFR BLD AUTO: 9.5 % (ref 0–13.4)
NEUTROPHILS # BLD AUTO: 3.22 K/UL (ref 2–7.15)
NEUTROPHILS NFR BLD: 55.3 % (ref 44–72)
NRBC # BLD AUTO: 0 K/UL
NRBC BLD-RTO: 0 /100 WBC
PLATELET # BLD AUTO: 262 K/UL (ref 164–446)
PMV BLD AUTO: 10.3 FL (ref 9–12.9)
POTASSIUM SERPL-SCNC: 3.8 MMOL/L (ref 3.6–5.5)
PROT SERPL-MCNC: 7.4 G/DL (ref 6–8.2)
PROTHROMBIN TIME: 12.4 SEC (ref 12–14.6)
RBC # BLD AUTO: 5.26 M/UL (ref 4.2–5.4)
RH BLD: NORMAL
SODIUM SERPL-SCNC: 142 MMOL/L (ref 135–145)
TROPONIN T SERPL-MCNC: 11 NG/L (ref 6–19)
WBC # BLD AUTO: 5.8 K/UL (ref 4.8–10.8)

## 2022-03-24 PROCEDURE — 700102 HCHG RX REV CODE 250 W/ 637 OVERRIDE(OP): Performed by: INTERNAL MEDICINE

## 2022-03-24 PROCEDURE — 99220 PR INITIAL OBSERVATION CARE,LEVL III: CPT | Performed by: INTERNAL MEDICINE

## 2022-03-24 PROCEDURE — 93005 ELECTROCARDIOGRAM TRACING: CPT | Performed by: EMERGENCY MEDICINE

## 2022-03-24 PROCEDURE — 86850 RBC ANTIBODY SCREEN: CPT

## 2022-03-24 PROCEDURE — 70496 CT ANGIOGRAPHY HEAD: CPT

## 2022-03-24 PROCEDURE — 85730 THROMBOPLASTIN TIME PARTIAL: CPT

## 2022-03-24 PROCEDURE — 0042T CT-CEREBRAL PERFUSION ANALYSIS: CPT

## 2022-03-24 PROCEDURE — 99285 EMERGENCY DEPT VISIT HI MDM: CPT

## 2022-03-24 PROCEDURE — 86901 BLOOD TYPING SEROLOGIC RH(D): CPT

## 2022-03-24 PROCEDURE — 86900 BLOOD TYPING SEROLOGIC ABO: CPT

## 2022-03-24 PROCEDURE — 700117 HCHG RX CONTRAST REV CODE 255: Performed by: EMERGENCY MEDICINE

## 2022-03-24 PROCEDURE — 82962 GLUCOSE BLOOD TEST: CPT

## 2022-03-24 PROCEDURE — 97161 PT EVAL LOW COMPLEX 20 MIN: CPT

## 2022-03-24 PROCEDURE — 85610 PROTHROMBIN TIME: CPT

## 2022-03-24 PROCEDURE — 71045 X-RAY EXAM CHEST 1 VIEW: CPT

## 2022-03-24 PROCEDURE — 36415 COLL VENOUS BLD VENIPUNCTURE: CPT

## 2022-03-24 PROCEDURE — 70498 CT ANGIOGRAPHY NECK: CPT

## 2022-03-24 PROCEDURE — 94760 N-INVAS EAR/PLS OXIMETRY 1: CPT

## 2022-03-24 PROCEDURE — A9270 NON-COVERED ITEM OR SERVICE: HCPCS | Performed by: INTERNAL MEDICINE

## 2022-03-24 PROCEDURE — 70450 CT HEAD/BRAIN W/O DYE: CPT

## 2022-03-24 PROCEDURE — G0378 HOSPITAL OBSERVATION PER HR: HCPCS

## 2022-03-24 PROCEDURE — 92610 EVALUATE SWALLOWING FUNCTION: CPT

## 2022-03-24 PROCEDURE — 84484 ASSAY OF TROPONIN QUANT: CPT

## 2022-03-24 PROCEDURE — 80053 COMPREHEN METABOLIC PANEL: CPT

## 2022-03-24 PROCEDURE — 85025 COMPLETE CBC W/AUTO DIFF WBC: CPT

## 2022-03-24 PROCEDURE — 83036 HEMOGLOBIN GLYCOSYLATED A1C: CPT

## 2022-03-24 RX ORDER — PROMETHAZINE HYDROCHLORIDE 25 MG/1
12.5-25 TABLET ORAL EVERY 4 HOURS PRN
Status: DISCONTINUED | OUTPATIENT
Start: 2022-03-24 | End: 2022-03-24

## 2022-03-24 RX ORDER — LABETALOL HYDROCHLORIDE 5 MG/ML
10 INJECTION, SOLUTION INTRAVENOUS
Status: DISCONTINUED | OUTPATIENT
Start: 2022-03-24 | End: 2022-03-25 | Stop reason: HOSPADM

## 2022-03-24 RX ORDER — BISACODYL 10 MG
10 SUPPOSITORY, RECTAL RECTAL
Status: DISCONTINUED | OUTPATIENT
Start: 2022-03-24 | End: 2022-03-25 | Stop reason: HOSPADM

## 2022-03-24 RX ORDER — ASPIRIN 300 MG/1
300 SUPPOSITORY RECTAL DAILY
Status: DISCONTINUED | OUTPATIENT
Start: 2022-03-24 | End: 2022-03-25 | Stop reason: HOSPADM

## 2022-03-24 RX ORDER — PROCHLORPERAZINE EDISYLATE 5 MG/ML
5-10 INJECTION INTRAMUSCULAR; INTRAVENOUS EVERY 4 HOURS PRN
Status: DISCONTINUED | OUTPATIENT
Start: 2022-03-24 | End: 2022-03-25 | Stop reason: HOSPADM

## 2022-03-24 RX ORDER — POLYETHYLENE GLYCOL 3350 17 G/17G
1 POWDER, FOR SOLUTION ORAL
Status: DISCONTINUED | OUTPATIENT
Start: 2022-03-24 | End: 2022-03-25 | Stop reason: HOSPADM

## 2022-03-24 RX ORDER — HYDRALAZINE HYDROCHLORIDE 20 MG/ML
10 INJECTION INTRAMUSCULAR; INTRAVENOUS
Status: DISCONTINUED | OUTPATIENT
Start: 2022-03-24 | End: 2022-03-25 | Stop reason: HOSPADM

## 2022-03-24 RX ORDER — ONDANSETRON 4 MG/1
4 TABLET, ORALLY DISINTEGRATING ORAL EVERY 4 HOURS PRN
Status: DISCONTINUED | OUTPATIENT
Start: 2022-03-24 | End: 2022-03-25 | Stop reason: HOSPADM

## 2022-03-24 RX ORDER — ROSUVASTATIN CALCIUM 10 MG/1
10 TABLET, COATED ORAL EVERY EVENING
Status: DISCONTINUED | OUTPATIENT
Start: 2022-03-24 | End: 2022-03-25 | Stop reason: HOSPADM

## 2022-03-24 RX ORDER — ASPIRIN 81 MG/1
324 TABLET, CHEWABLE ORAL DAILY
Status: DISCONTINUED | OUTPATIENT
Start: 2022-03-24 | End: 2022-03-25 | Stop reason: HOSPADM

## 2022-03-24 RX ORDER — LEVOTHYROXINE SODIUM 0.07 MG/1
150 TABLET ORAL
Status: DISCONTINUED | OUTPATIENT
Start: 2022-03-25 | End: 2022-03-25 | Stop reason: HOSPADM

## 2022-03-24 RX ORDER — ASPIRIN 325 MG
325 TABLET ORAL DAILY
Status: DISCONTINUED | OUTPATIENT
Start: 2022-03-24 | End: 2022-03-25 | Stop reason: HOSPADM

## 2022-03-24 RX ORDER — PROMETHAZINE HYDROCHLORIDE 25 MG/1
12.5-25 SUPPOSITORY RECTAL EVERY 4 HOURS PRN
Status: DISCONTINUED | OUTPATIENT
Start: 2022-03-24 | End: 2022-03-24

## 2022-03-24 RX ORDER — ACETAMINOPHEN 325 MG/1
650 TABLET ORAL EVERY 6 HOURS PRN
Status: DISCONTINUED | OUTPATIENT
Start: 2022-03-24 | End: 2022-03-25 | Stop reason: HOSPADM

## 2022-03-24 RX ORDER — AMOXICILLIN 250 MG
2 CAPSULE ORAL 2 TIMES DAILY
Status: DISCONTINUED | OUTPATIENT
Start: 2022-03-24 | End: 2022-03-25 | Stop reason: HOSPADM

## 2022-03-24 RX ORDER — ONDANSETRON 2 MG/ML
4 INJECTION INTRAMUSCULAR; INTRAVENOUS EVERY 4 HOURS PRN
Status: DISCONTINUED | OUTPATIENT
Start: 2022-03-24 | End: 2022-03-25 | Stop reason: HOSPADM

## 2022-03-24 RX ORDER — LABETALOL HYDROCHLORIDE 5 MG/ML
10 INJECTION, SOLUTION INTRAVENOUS EVERY 4 HOURS PRN
Status: DISCONTINUED | OUTPATIENT
Start: 2022-03-24 | End: 2022-03-24

## 2022-03-24 RX ADMIN — IOHEXOL 120 ML: 350 INJECTION, SOLUTION INTRAVENOUS at 11:15

## 2022-03-24 RX ADMIN — ROSUVASTATIN 10 MG: 10 TABLET, FILM COATED ORAL at 21:23

## 2022-03-24 RX ADMIN — ASPIRIN 325 MG ORAL TABLET 325 MG: 325 PILL ORAL at 14:27

## 2022-03-24 ASSESSMENT — ENCOUNTER SYMPTOMS
FEVER: 0
SORE THROAT: 0
COUGH: 0
HALLUCINATIONS: 0
DIARRHEA: 0
WEAKNESS: 0
SHORTNESS OF BREATH: 0
CHILLS: 0
HEARTBURN: 0
BACK PAIN: 0
NAUSEA: 0
MYALGIAS: 0
VOMITING: 0
HEADACHES: 0
DEPRESSION: 0
BLOOD IN STOOL: 0
ABDOMINAL PAIN: 0
FOCAL WEAKNESS: 1
DIZZINESS: 0
PALPITATIONS: 0

## 2022-03-24 ASSESSMENT — LIFESTYLE VARIABLES
HOW MANY TIMES IN THE PAST YEAR HAVE YOU HAD 5 OR MORE DRINKS IN A DAY: 0
TOTAL SCORE: 0
EVER FELT BAD OR GUILTY ABOUT YOUR DRINKING: NO
HAVE YOU EVER FELT YOU SHOULD CUT DOWN ON YOUR DRINKING: NO
EVER HAD A DRINK FIRST THING IN THE MORNING TO STEADY YOUR NERVES TO GET RID OF A HANGOVER: NO
CONSUMPTION TOTAL: NEGATIVE
HAVE PEOPLE ANNOYED YOU BY CRITICIZING YOUR DRINKING: NO
TOTAL SCORE: 0
ALCOHOL_USE: YES
AVERAGE NUMBER OF DAYS PER WEEK YOU HAVE A DRINK CONTAINING ALCOHOL: 2
ON A TYPICAL DAY WHEN YOU DRINK ALCOHOL HOW MANY DRINKS DO YOU HAVE: 3
TOTAL SCORE: 0

## 2022-03-24 ASSESSMENT — PATIENT HEALTH QUESTIONNAIRE - PHQ9
2. FEELING DOWN, DEPRESSED, IRRITABLE, OR HOPELESS: NOT AT ALL
1. LITTLE INTEREST OR PLEASURE IN DOING THINGS: NOT AT ALL
SUM OF ALL RESPONSES TO PHQ9 QUESTIONS 1 AND 2: 0

## 2022-03-24 ASSESSMENT — PAIN DESCRIPTION - PAIN TYPE: TYPE: ACUTE PAIN

## 2022-03-24 ASSESSMENT — COGNITIVE AND FUNCTIONAL STATUS - GENERAL
MOBILITY SCORE: 24
WALKING IN HOSPITAL ROOM: A LITTLE
DAILY ACTIVITIY SCORE: 24
SUGGESTED CMS G CODE MODIFIER DAILY ACTIVITY: CH
SUGGESTED CMS G CODE MODIFIER MOBILITY: CH
SUGGESTED CMS G CODE MODIFIER MOBILITY: CJ
MOBILITY SCORE: 22
CLIMB 3 TO 5 STEPS WITH RAILING: A LITTLE

## 2022-03-24 ASSESSMENT — FIBROSIS 4 INDEX
FIB4 SCORE: 1.12
FIB4 SCORE: 1.01

## 2022-03-24 ASSESSMENT — GAIT ASSESSMENTS
DEVIATION: STEP TO
GAIT LEVEL OF ASSIST: INDEPENDENT
DISTANCE (FEET): 200

## 2022-03-24 NOTE — PROGRESS NOTES
Admit profile completed. Per pt, MRI screening completed while in ER - pt states she is claustrophobic and has a cervical spine fusion.

## 2022-03-24 NOTE — ED NOTES
Pharmacy Medication Reconciliation      ~Medication reconciliation updated and complete per patient at bedside  ~Allergies have been verified and updated   ~No oral ABX within the last 30 days  ~Patient home pharmacy:[facility]

## 2022-03-24 NOTE — THERAPY
Occupational Therapy     Patient Name: Fatoumata Redding  Age:  62 y.o., Sex:  female  Medical Record #: [id number]  Today's Date: 3/24/2022    Discussed with RN, SLP, PT       03/24/22 4645   Interdisciplinary Plan of Care Collaboration   Collaboration Comments OT orders rec'd.  Pt at baseline per SLP and PT.  No apparent OT needs at this time.  Will defer OT eval to prevent overutilization of services.

## 2022-03-24 NOTE — THERAPY
"Speech Language Pathology   Clinical Swallow Evaluation     Patient Name: Fatoumata Redding  AGE:  62 y.o., SEX:  female  Medical Record #: [id number]  Today's Date: 3/24/2022     Precautions  Precautions: (P) Swallow Precautions ( See Comments)    HPI:  61 y/o admitted on 3/24 for stroke-like symptoms. Not seen by SLP before at [facility]. Dx chest found \"No acute cardiopulmonary abnormality identified.\" CT of head found \"No acute intracranial findings.\" CTA of head found \"No large vessel occlusion or thrombus is identified.\" CTA of neck found \"Mild atherosclerosis. Resulting stenosis is less than 50%.\"    PMHx:  thyroid disease, hyperlipidemia, and HTN      Level of Consciousness: Alert  Affect/Behavior: Appropriate  Follows Directives: Yes  Hearing: Functional hearing  Vision: Functional vision      Prior Living Situation & Level of Function:  Pt lives at home independently with spouse and her son.       Oral Mechanism Evaluation  Facial Symmetry: slight L lip droop/weakness  Facial Sensation: Impaired  Labial Observations: L sided weakness (slight)  Lingual Observations: Midline  Dentition: Good  Comments: per pt report, only change from baseline is slight L facial droop/weakness. Does not feel numb or tingly.      Voice  Quality: WFL  Resonance: WFL  Intensity: Appropriate  Cough: WFL  Comments:      Current Method of Nutrition   NPO until cleared by speech pathology      Assessment  Positioning: Lopez's (60-90 degrees)  Bolus Administration: Patient  Oxygen Requirements: Room Air  Factor(s) Affecting Performance: None    Swallowing Trials  Ice: Not tested  Thin Liquid (TN0): WFL  Mildly Thick Liquid (MT2): Not tested  Liquidised (LQ3): Not tested  Pureed (PU4): Not tested  Minced & Moist (MM5): Not tested  Soft & Bite Sized (SB6): WFL  Easy to Chew (EC7): Not tested  Regular (RG7): WFL    Comments:  Cough x1 noted during conversation, however, no overt s/sx of aspiration noted with trials of thin liquids via cup sip " and consecutive cup, soft solids/mixed consistencies, and solids. Mastication timely and she denied any changes with mastication. Vocal quality clear following the swallow. Discussed s/sx of aspiration and pt/son verbalized good understanding.       Recommendations  1.  Regular/thin liquid diet  2.  Instrumentation: None indicated at this time  3.  Swallowing Instructions & Precautions:   Supervision: Independent  Positioning: Fully upright and midline during oral intake  Medication: Whole with liquid  Strategies: None  Oral Care: BID    Plan    Recommend Speech Therapy 3 times per week until therapy goals are met for the following treatments:  Dysphagia Training and Patient / Family / Caregiver Education.    Discharge Recommendations: (P) Anticipate that the patient will have no further speech therapy needs after discharge from the hospital         Objective       03/24/22 1506   Precautions   Precautions Swallow Precautions ( See Comments)   Vitals   O2 (LPM) 0   O2 Delivery Device None - Room Air   Pain 0 - 10 Group   Therapist Pain Assessment Post Activity Pain Same as Prior to Activity;Nurse Notified;0   Prior Living Situation   Lives with - Patient's Self Care Capacity Spouse;Adult Children   Prior Level Of Function   Communication Within Functional Limits   Swallow Within Functional Limits   Dentition Intact   Dentures None   Hearing Within Functional Limits for Evaluation   Vision Within Functional Limits for Evaluation   Occupation (Pre-Hospital Vocational) Not Employed   Short Term Goals   Short Term Goal # 1 Pt will consume a regular/thin liquid diet with no overt s/sx of aspiration   Education Group   Education Provided Dysphagia   Dysphagia Patient Response Patient;Family;Acceptance;Explanation;Verbal Demonstration   Anticipated Discharge Needs   Discharge Recommendations Anticipate that the patient will have no further speech therapy needs after discharge from the hospital   Therapy Recommendations  Upon DC Not Indicated   Interdisciplinary Plan of Care Collaboration   IDT Collaboration with  Nursing;Occupational Therapist;Physical Therapist   Patient Position at End of Therapy Seated;In Bed;Call Light within Reach;Tray Table within Reach;Phone within Reach

## 2022-03-24 NOTE — ASSESSMENT & PLAN NOTE
Presented with left facial droop involving the mouth and left eye.  Symptoms have improved significantly  CTA and perfusion scan negative  She has risk factors of hypertension and family history  Telemetry  Check FLP, A1c  Allow permissive hypertension until CVA is ruled out  MRI

## 2022-03-24 NOTE — ASSESSMENT & PLAN NOTE
Here with hypertensive urgency, blood pressure 199/94 on presentation  Allow permissive hypertension until CVA is ruled out  Labetalol for pressure greater than 220 systolic

## 2022-03-24 NOTE — H&P
Hospital Medicine History & Physical Note    Date of Service  3/24/2022    Primary Care Physician  Messi Patel M.D.    Consultants  None    Specialist Names: n/a    Code Status  Full Code    Chief Complaint  Chief Complaint   Patient presents with   • Possible Stroke     Son noticed a L facial droop and a R eye droop when pt awoke this am, pt A & 0 x 4, ambulates well, moves all ext, speech clear       History of Presenting Illness  Fatoumata Redding is a 62 y.o. female with a history of hypertension who presented 3/24/2022 with left facial droop.    Was in her normal state health upon going to bed last night.  When she woke up this morning her son noted that the left side of her face appeared to be droopy.  He thought that her left eye was not opening normally and the left side of her mouth was curving down.  He felt her affect was more flat than usual.  She did not experience any slurred speech, headache, tingling, numbness or weakness of her extremities.  She has never had similar symptoms in the past.  Due to concern for stroke they came to the ER for evaluation.    He denies any recent illness with shortness of breath, cough or fever.  She has had no nausea or vomiting.  Normal appetite.  No recent trauma    ER course: CTA head and neck negative, CT perfusion scan negative.  Initial blood pressure 190/95.  Labs and EKG unremarkable    I discussed the plan of care with patient, family and ERP.    Review of Systems  Review of Systems   Constitutional: Negative for chills, fever and malaise/fatigue.   HENT: Negative for sore throat.    Respiratory: Negative for cough and shortness of breath.    Cardiovascular: Negative for chest pain and palpitations.   Gastrointestinal: Negative for abdominal pain, blood in stool, diarrhea, heartburn, nausea and vomiting.   Genitourinary: Negative for dysuria and frequency.   Musculoskeletal: Negative for back pain and myalgias.   Neurological: Positive for focal weakness  "(Facial droop). Negative for dizziness, weakness and headaches.   Psychiatric/Behavioral: Negative for depression and hallucinations.   All other systems reviewed and are negative.      Past Medical History   has a past medical history of Hyperlipidemia, Hypertension, palpitations, and Thyroid disease.    Surgical History   has a past surgical history that includes abdominal exploration (2013); abdominal exploration (2011); cholecystectomy; and primary c section.     Family History  family history includes Cancer in her paternal grandfather and sister; Cancer (age of onset: 55) in her sister; Cancer (age of onset: 74) in her father; Cancer (age of onset: 79) in her mother; Diabetes in her maternal grandmother and sister; Stroke in her father.   Family history reviewed with patient. There is family history that is pertinent to the chief complaint.     Social History   reports that she has never smoked. She has never used smokeless tobacco. She reports current alcohol use. She reports that she does not use drugs.    Allergies  Allergies   Allergen Reactions   • Prednisone Hives and Rash     \"hives and rash\"   • Promethazine Rash     Possible rash, not confirmed   • Tape Rash     Paper tape,other tapes give her rash   • Latex Rash     Latex gloves only-rash         Medications  Prior to Admission Medications   Prescriptions Last Dose Informant Patient Reported? Taking?   levothyroxine (SYNTHROID) 150 MCG Tab 3/24/2022 at 0730 Patient No No   Sig: Take 1 Tablet by mouth every morning on an empty stomach.   losartan (COZAAR) 25 MG Tab 3/24/2022 at 0730 Patient Yes No   Sig: Take 25 mg by mouth every day.   rosuvastatin (CRESTOR) 10 MG Tab 3/23/2022 at PM Patient No No   Sig: Take 1 Tablet by mouth every day.      Facility-Administered Medications: None       Physical Exam  Temp:  [36.4 °C (97.5 °F)-36.8 °C (98.2 °F)] 36.8 °C (98.2 °F)  Pulse:  [63-71] 65  Resp:  [16-18] 18  BP: (156-199)/() 175/88  SpO2:  [95 %-99 " %] 99 %  Blood Pressure: (!) 175/88   Temperature: 36.8 °C (98.2 °F)   Pulse: 65   Respiration: 18   Pulse Oximetry: 99 %       Physical Exam  Constitutional:       General: She is not in acute distress.  HENT:      Nose: Nose normal.      Mouth/Throat:      Mouth: Mucous membranes are dry.   Cardiovascular:      Rate and Rhythm: Normal rate and regular rhythm.      Pulses: Normal pulses.   Pulmonary:      Effort: Pulmonary effort is normal.      Breath sounds: Normal breath sounds.   Abdominal:      General: There is no distension.      Palpations: Abdomen is soft.   Musculoskeletal:         General: No swelling.      Cervical back: No muscular tenderness.   Lymphadenopathy:      Cervical: No cervical adenopathy.   Skin:     General: Skin is warm and dry.      Findings: No rash.   Neurological:      General: No focal deficit present.      Mental Status: She is alert and oriented to person, place, and time.      Cranial Nerves: Cranial nerve deficit (Very slight delay with left corner of the mouth upslope during smile.  Otherwise symmetrical facial movements) present.      Sensory: No sensory deficit.      Motor: No weakness.      Coordination: Coordination normal.      Gait: Gait normal.      Deep Tendon Reflexes: Reflexes normal.   Psychiatric:         Mood and Affect: Mood normal.         Thought Content: Thought content normal.         Laboratory:  Recent Labs     03/24/22  1054   WBC 5.8   RBC 5.26   HEMOGLOBIN 15.1   HEMATOCRIT 46.2   MCV 87.8   MCH 28.7   MCHC 32.7*   RDW 40.8   PLATELETCT 262   MPV 10.3     Recent Labs     03/24/22  1054   SODIUM 142   POTASSIUM 3.8   CHLORIDE 106   CO2 26   GLUCOSE 88   BUN 10   CREATININE 0.55   CALCIUM 9.4     Recent Labs     03/24/22  1054   ALTSGPT 15   ASTSGOT 19   ALKPHOSPHAT 73   TBILIRUBIN 0.4   GLUCOSE 88     Recent Labs     03/24/22  1054   APTT 31.7   INR 1.00     No results for input(s): NTPROBNP in the last 72 hours.      Recent Labs     03/24/22  1054    TROPONINT 11       Imaging:  DX-CHEST-PORTABLE (1 VIEW)   Final Result      No acute cardiopulmonary abnormality identified.      CT-CTA NECK WITH & W/O-POST PROCESSING   Final Result      1.  Mild atherosclerosis. Resulting stenosis is less than 50%.      CT-CTA HEAD WITH & W/O-POST PROCESS   Final Result      No large vessel occlusion or thrombus is identified.      CT-CEREBRAL PERFUSION ANALYSIS   Final Result      1.  Cerebral blood flow less than 30% likely representing completed infarct = 0 mL.      2.  T Max more than 6 seconds likely representing combination of completed infarct and ischemia = 730 mL, likely artifactual.      3.  Mismatched volume likely representing ischemic brain/penumbra = 730 mL, likely artifactual      4.  Please note that the cerebral perfusion was performed on the limited brain tissue around the basal ganglia region. Infarct/ischemia outside the CT perfusion sections can be missed in this study.      CT-HEAD W/O   Final Result      No acute intracranial findings.               MR-BRAIN-W/O    (Results Pending)       Assessment/Plan:  I anticipate this patient is appropriate for observation status at this time.    Hypothyroid- (present on admission)  Assessment & Plan  Continue Synthroid 150    Essential hypertension- (present on admission)  Assessment & Plan  Here with hypertensive urgency, blood pressure 199/94 on presentation  Allow permissive hypertension until CVA is ruled out  Labetalol for pressure greater than 220 systolic    Esophageal reflux- (present on admission)  Assessment & Plan  .        VTE prophylaxis: SCDs/TEDs and enoxaparin ppx

## 2022-03-24 NOTE — ED PROVIDER NOTES
ED Provider Note    Scribed for Noy Wong M.D. by Samuel Higginbotham. 3/24/2022  10:49 AM    Primary care provider: Messi Patel M.D.  Means of arrival: walk-in  History obtained from: patient  History limited by: none    CHIEF COMPLAINT  Chief Complaint   Patient presents with   • Possible Stroke     Son noticed a L facial droop and a R eye droop when pt awoke this am, pt A & 0 x 4, ambulates well, moves all ext, speech clear     HPI  Fatoumata Redding is a 62 y.o. female who presents to the Emergency Department for evaluation of stroke-like symptoms onset when she woke up this morning. She states her son noticed a left-sided facial droop and left eyelid droop after she woke up this morning. She states she has never before had stroke symptoms. She denies any headaches, numbness, vision changes, chest pain, shortness of breath, vomiting, nausea, or weakness. No alleviating factors were reported. She reports surgical history including 2 C-sections and gastric surgery. She admits to occasionally drinking alcohol, but denies tobacco or drug use. She reports history of thyroid disease, hyperlipidemia, and hypertension and notes she takes levothyroxine, losartan, and rosuvastatin daily.      REVIEW OF SYSTEMS  HEENT:  No ear pain, congestion, or sore throat   EYES: no discharge, redness, or vision changes  CARDIAC: no chest pain, no palpitations    PULMONARY: no dyspnea, cough, or congestion   GI: no vomiting, diarrhea, or abdominal pain   : no dysuria, back pain, or hematuria   Neuro: no weakness, numbness, aphasia, or headache. Positive left side facial droop  Musculoskeletal: no swelling, deformity, pain, or joint swelling  Endocrine: no fevers, sweating, or weight loss   SKIN: no rash, erythema, or contusions     See history of present illness. All other systems are negative. C.    PAST MEDICAL HISTORY   has a past medical history of Hyperlipidemia, Hypertension, palpitations, and Thyroid disease.    SURGICAL  "HISTORY   has a past surgical history that includes abdominal exploration (2013); abdominal exploration (2011); cholecystectomy; and primary c section.    SOCIAL HISTORY  Social History     Tobacco Use   • Smoking status: Never Smoker   • Smokeless tobacco: Never Used   Vaping Use   • Vaping Use: Never used   Substance Use Topics   • Alcohol use: Yes     Comment: 1 glass wine/week   • Drug use: Never      Social History     Substance and Sexual Activity   Drug Use Never     FAMILY HISTORY  Family History   Problem Relation Age of Onset   • Cancer Mother 79        ovarian CA  pt is BRCA 1 and 2 negative   • Cancer Father 74        lung ca; smoker   • Stroke Father    • Cancer Sister 55        Brca -metastatic   • Cancer Sister         melanoma face, in remission   • Diabetes Sister    • Diabetes Maternal Grandmother    • Cancer Paternal Grandfather         lung Ca smoker   • Heart Disease Neg Hx      CURRENT MEDICATIONS  Current Outpatient Medications   Medication Instructions   • levothyroxine (SYNTHROID) 150 mcg, Oral, EACH MORNING ON EMPTY STOMACH   • losartan (COZAAR) 25 mg, Oral, DAILY   • rosuvastatin (CRESTOR) 10 mg, Oral, DAILY     ALLERGIES  Allergies   Allergen Reactions   • Prednisone Hives and Rash     \"hives and rash\"   • Promethazine Rash     Possible rash, not confirmed   • Tape Rash     Paper tape,other tapes give her rash   • Latex Rash     Latex gloves only-rash       PHYSICAL EXAM  VITAL SIGNS: BP (!) 168/108   Pulse 66   Temp 36.4 °C (97.5 °F) (Temporal)   Resp 16   Ht 1.6 m (5' 2.99\")   Wt 90.1 kg (198 lb 10.2 oz)   SpO2 98%   BMI 35.20 kg/m²     Constitutional: Well developed, Well nourished, No acute distress, Non-toxic appearance.   HEENT: Normocephalic, Atraumatic,  external ears normal, pharynx pink,  Mucous  Membranes moist, No rhinorrhea or mucosal edema  Eyes: PERRL, EOMI, Conjunctiva normal, No discharge.   Neck: Normal range of motion, No tenderness, Supple, No stridor. "   Lymphatic: No lymphadenopathy    Cardiovascular: Hypertensive. Regular Rate and Rhythm, No murmurs,  rubs, or gallops.   Thorax & Lungs: Lungs clear to auscultation bilaterally, No respiratory distress, No wheezes, rhales or rhonchi, No chest wall tenderness.   Abdomen: Bowel sounds normal, Soft, non tender, non distended,  No pulsatile masses., no rebound guarding or peritoneal signs.   Skin: Warm, Dry, No erythema, No rash,   Back:  No CVA tenderness,  No spinal tenderness, bony crepitance, step offs, or instability.   Neurologic: Alert & oriented clear speech. Mild droop to left corner of mouth. No numbness. 5/5 equal bilateral strength to upper and lower extremities. NIH 1  Extremities: Equal, intact distal pulses, No cyanosis, clubbing or edema,  No tenderness.   Musculoskeletal: Good range of motion in all major joints. No tenderness to palpation or major deformities noted.     DIAGNOSTIC STUDIES / PROCEDURES    LABS  Results for orders placed or performed during the hospital encounter of 03/24/22   CBC WITH DIFFERENTIAL   Result Value Ref Range    WBC 5.8 4.8 - 10.8 K/uL    RBC 5.26 4.20 - 5.40 M/uL    Hemoglobin 15.1 12.0 - 16.0 g/dL    Hematocrit 46.2 37.0 - 47.0 %    MCV 87.8 81.4 - 97.8 fL    MCH 28.7 27.0 - 33.0 pg    MCHC 32.7 (L) 33.6 - 35.0 g/dL    RDW 40.8 35.9 - 50.0 fL    Platelet Count 262 164 - 446 K/uL    MPV 10.3 9.0 - 12.9 fL    Neutrophils-Polys 55.30 44.00 - 72.00 %    Lymphocytes 29.70 22.00 - 41.00 %    Monocytes 9.50 0.00 - 13.40 %    Eosinophils 4.30 0.00 - 6.90 %    Basophils 0.90 0.00 - 1.80 %    Immature Granulocytes 0.30 0.00 - 0.90 %    Nucleated RBC 0.00 /100 WBC    Neutrophils (Absolute) 3.22 2.00 - 7.15 K/uL    Lymphs (Absolute) 1.73 1.00 - 4.80 K/uL    Monos (Absolute) 0.55 0.00 - 0.85 K/uL    Eos (Absolute) 0.25 0.00 - 0.51 K/uL    Baso (Absolute) 0.05 0.00 - 0.12 K/uL    Immature Granulocytes (abs) 0.02 0.00 - 0.11 K/uL    NRBC (Absolute) 0.00 K/uL   COMP METABOLIC PANEL    Result Value Ref Range    Sodium 142 135 - 145 mmol/L    Potassium 3.8 3.6 - 5.5 mmol/L    Chloride 106 96 - 112 mmol/L    Co2 26 20 - 33 mmol/L    Anion Gap 10.0 7.0 - 16.0    Glucose 88 65 - 99 mg/dL    Bun 10 8 - 22 mg/dL    Creatinine 0.55 0.50 - 1.40 mg/dL    Calcium 9.4 8.4 - 10.2 mg/dL    AST(SGOT) 19 12 - 45 U/L    ALT(SGPT) 15 2 - 50 U/L    Alkaline Phosphatase 73 30 - 99 U/L    Total Bilirubin 0.4 0.1 - 1.5 mg/dL    Albumin 4.5 3.2 - 4.9 g/dL    Total Protein 7.4 6.0 - 8.2 g/dL    Globulin 2.9 1.9 - 3.5 g/dL    A-G Ratio 1.6 g/dL   PROTHROMBIN TIME   Result Value Ref Range    PT 12.4 12.0 - 14.6 sec    INR 1.00 0.87 - 1.13   APTT   Result Value Ref Range    APTT 31.7 24.7 - 36.0 sec   COD (ADULT)   Result Value Ref Range    ABO Grouping Only O     Rh Grouping Only POS     Antibody Screen-Cod NEG    TROPONIN   Result Value Ref Range    Troponin T 11 6 - 19 ng/L   ESTIMATED GFR   Result Value Ref Range    GFR (CKD-EPI) 105 >60 mL/min/1.73 m 2   EKG (NOW)   Result Value Ref Range    Report       [facility] Emergency Dept.    Test Date:  2022  Pt Name:    BENJAMIN BEDOLLAQUE     Department: [facility]  MRN:        [id number]                      [facility]  Gender:     Female                       Technician: [id number]  :        1960                   Requested By:DERRICK MARTINEZ  Order #:    [id number]                    Reading MD: DERRICK MARTINEZ MD    Measurements  Intervals                                Axis  Rate:       63                           P:          4  KS:         220                          QRS:        54  QRSD:       86                           T:          21  QT:         404  QTc:        414    Interpretive Statements  SINUS RHYTHM  FIRST DEGREE AV BLOCK  BORDERLINE LOW VOLTAGE IN FRONTAL LEADS  No previous ECG available for comparison  Electronically Signed On 3- 10:55:01 PDT by DERRICK MARTINEZ MD     POCT glucose device results   Result  Value Ref Range    POC Glucose, Blood 84 65 - 99 mg/dL       All labs reviewed by me.    EKG  12 lead EKG; Interpreted by emergency department physician as above.    RADIOLOGY  DX-CHEST-PORTABLE (1 VIEW)   Final Result      No acute cardiopulmonary abnormality identified.      CT-CTA NECK WITH & W/O-POST PROCESSING   Final Result      1.  Mild atherosclerosis. Resulting stenosis is less than 50%.      CT-CTA HEAD WITH & W/O-POST PROCESS   Final Result      No large vessel occlusion or thrombus is identified.      CT-CEREBRAL PERFUSION ANALYSIS   Final Result      1.  Cerebral blood flow less than 30% likely representing completed infarct = 0 mL.      2.  T Max more than 6 seconds likely representing combination of completed infarct and ischemia = 730 mL, likely artifactual.      3.  Mismatched volume likely representing ischemic brain/penumbra = 730 mL, likely artifactual      4.  Please note that the cerebral perfusion was performed on the limited brain tissue around the basal ganglia region. Infarct/ischemia outside the CT perfusion sections can be missed in this study.      CT-HEAD W/O   Final Result      No acute intracranial findings.                 The radiologist's interpretation of all radiological studies have been reviewed by me.    COURSE & MEDICAL DECISION MAKING  Nursing notes, VS, PMSFHx reviewed in chart.    10:49 AM Patient seen and examined at bedside. Patient is hypertensive. Ordered EKG, DX-Chest, CT-Head w/o, CT-Cerebral perfusion analysis, CT-CTA head w/ & w/o post process, CT-CTA neck w/ & w/o post processing, CBC w/ diff, CMP, prothrombin time, APTT, COD (adult), and troponin to evaluate her symptoms. The differential diagnoses include but are not limited to: CVA, TIA, other intracranial pathology, early Bell's palsy.    11:53 AM - I reevaluated the patient at bedside. Patient is not a TPA candidate because of wake-up symptoms (out of time window) and mild symptoms with NIH score of 1. I  discussed the patient's diagnostic study results. I informed the patient of my plan to admit today given the patient's current presentation and diagnostic study results for MRI and further workup. Patient verbalizes understanding and support with my plan for admission.     NIH score is 1  12:42 PM book with Dr. Campos who accepts the patient for admission.  The patient and family understand that the neck step in the work-up is an MRI and an echo.  She will be admitted overnight for further evaluation.    DISPOSITION:  Patient will be hospitalized by Dr. Capellan  in guarded condition.      FINAL IMPRESSION  1. TIA (transient ischemic attack)    2. Facial droop          Samuel WYLIE (Scribe), am scribing for, and in the presence of, Noy Wong M.D..    Electronically signed by: Samuel Higginbotham (Scribe), 3/24/2022    Noy WYLIE M.D. personally performed the services described in this documentation, as scribed by Samuel Higginbotham in my presence, and it is both accurate and complete.    The note accurately reflects work and decisions made by me.  Noy Wong M.D.  3/24/2022  12:42 PM

## 2022-03-24 NOTE — THERAPY
Physical Therapy   Initial Evaluation     Patient Name: Fatoumata Redding  Age:  62 y.o., Sex:  female  Medical Record #: [id number]  Today's Date: 3/24/2022     Precautions  Precautions: Swallow Precautions ( See Comments)    Assessment  Patient is 62 y.o. female with a diagnosis of TIA Pt lives at home with  and is usually active.Pt is safe with bed mob,transfers and ambulation. Normal function and balance,strength,sensation,proprioception all normal     Plan    Recommend Physical Therapy for Evaluation only    03/24/22 1600   Total Time Spent   Total Time Spent (Mins) 20   Charge Group   PT Evaluation PT Evaluation Low   Initial Contact Note    Initial Contact Note Order Received and Verified, Physical Therapy Evaluation in Progress with Full Report to Follow.   Prior Living Situation   Prior Services None   Housing / Facility 1 Story House   Steps Into Home 0   Steps In Home 0   Equipment Owned None   Lives with - Patient's Self Care Capacity Spouse   Prior Level of Functional Mobility   Bed Mobility Independent   Transfer Status Independent   Ambulation Independent   Distance Ambulation (Feet)   (community amb)   Assistive Devices Used None   History of Falls   History of Falls No   Cognition    Cognition / Consciousness WDL   Level of Consciousness Alert   Passive ROM Lower Body   Passive ROM Lower Body WDL   Active ROM Lower Body    Active ROM Lower Body  WDL   Strength Lower Body   Lower Body Strength  WDL   Sensation Lower Body   Lower Extremity Sensation   WDL   Lower Body Muscle Tone   Lower Body Muscle Tone  WDL   Coordination Upper Body   Coordination WDL   Coordination Lower Body    Coordination Lower Body  WDL   Balance Assessment   Sitting Balance (Static) Good   Sitting Balance (Dynamic) Good   Standing Balance (Static) Good   Standing Balance (Dynamic) Good   Weight Shift Sitting Good   Weight Shift Standing Good   Gait Analysis   Gait Level Of Assist Independent   Assistive Device None   Distance  (Feet) 200   # of Times Distance was Traveled 1   Deviation Step To   Weight Bearing Status full   Bed Mobility    Supine to Sit Independent   Sit to Supine Independent   Scooting Independent   Functional Mobility   Sit to Stand Independent   Bed, Chair, Wheelchair Transfer Independent   Transfer Method Stand Step   How much difficulty does the patient currently have...   Turning over in bed (including adjusting bedclothes, sheets and blankets)? 4   Sitting down on and standing up from a chair with arms (e.g., wheelchair, bedside commode, etc.) 4   Moving from lying on back to sitting on the side of the bed? 4   How much help from another person does the patient currently need...   Moving to and from a bed to a chair (including a wheelchair)? 4   Need to walk in a hospital room? 4   Climbing 3-5 steps with a railing? 4   6 clicks Mobility Score 24   Activity Tolerance   Sitting Edge of Bed 5   Standing 10   Patient / Family Goals    Patient / Family Goal #1 Home   Anticipated Discharge Equipment and Recommendations   DC Equipment Recommendations None   Discharge Recommendations Anticipate that the patient will have no further physical therapy needs after discharge from the hospital   Interdisciplinary Plan of Care Collaboration   IDT Collaboration with  Nursing   Session Information   Date / Session Number  3/24   Priority 0       DC Equipment Recommendations: (P) None  Discharge Recommendations: (P) Anticipate that the patient will have no further physical therapy needs after discharge from the hospital

## 2022-03-24 NOTE — DISCHARGE PLANNING
[facility] Transitional Care Coordination    Referral from:  Dr. Capellan  Insurance Provider on Facesheet: [employer]  Potential Rehab Diagnosis:  Stroke    Chart review indicates patient may have on going medical management and may have therapy needs to possibly meet inpatient rehab facility criteria with the goal of returning to community.    D/C support: Spouse/Adult Children     Physiatry consultation pended while waiting for additional information.  Left facial droop - r/o stroke.  Workup ongoing,  MRI brain pending.  PT/OT pending as clinically appropriate.  TCC will follow.      Last Covid test:       Thank you for the referral.

## 2022-03-24 NOTE — ED TRIAGE NOTES
Pt straight back to room  Chief Complaint   Patient presents with   • Possible Stroke     Son noticed a L facial droop and a R eye droop when pt awoke this am, pt A & 0 x 4, ambulates well, moves all ext, speech clear   ERP and family at bedside  BS 89  Pt A & 0 x 4, speech clear, ambulates well  + COVID vaccine    Pt resting on gurney, pt in no acute distress, pt provided call light, instructed to call if needing any assistance, instructed not to get up by self, gurney in lowest position.

## 2022-03-24 NOTE — PROGRESS NOTES
4 Eyes Skin Assessment Completed by SHANTHI Carrillo and SHANTHI Erazo.    Head WDL  Ears WDL  Nose WDL  Mouth WDL  Neck WDL  Breast/Chest WDL  Shoulder Blades WDL  Spine WDL  (R) Arm/Elbow/Hand WDL  (L) Arm/Elbow/Hand WDL  Abdomen WDL  Groin WDL  Scrotum/Coccyx/Buttocks WDL  (R) Leg WDL  (L) Leg WDL  (R) Heel/Foot/Toe WDL  (L) Heel/Foot/Toe WDL          Devices In Places Tele Box      Interventions In Place Pillows    Possible Skin Injury No    Pictures Uploaded Into Epic N/A  Wound Consult Placed N/A  RN Wound Prevention Protocol Ordered No

## 2022-03-25 ENCOUNTER — APPOINTMENT (OUTPATIENT)
Dept: RADIOLOGY | Facility: MEDICAL CENTER | Age: 62
End: 2022-03-25
Attending: INTERNAL MEDICINE
Payer: COMMERCIAL

## 2022-03-25 VITALS
DIASTOLIC BLOOD PRESSURE: 95 MMHG | WEIGHT: 222.44 LBS | HEIGHT: 63 IN | TEMPERATURE: 98.3 F | HEART RATE: 71 BPM | BODY MASS INDEX: 39.41 KG/M2 | SYSTOLIC BLOOD PRESSURE: 150 MMHG | OXYGEN SATURATION: 95 % | RESPIRATION RATE: 18 BRPM

## 2022-03-25 LAB
ALBUMIN SERPL BCP-MCNC: 3.8 G/DL (ref 3.2–4.9)
BASOPHILS # BLD AUTO: 0.9 % (ref 0–1.8)
BASOPHILS # BLD: 0.05 K/UL (ref 0–0.12)
BUN SERPL-MCNC: 12 MG/DL (ref 8–22)
CALCIUM SERPL-MCNC: 9 MG/DL (ref 8.4–10.2)
CHLORIDE SERPL-SCNC: 107 MMOL/L (ref 96–112)
CHOLEST SERPL-MCNC: 117 MG/DL (ref 100–199)
CO2 SERPL-SCNC: 23 MMOL/L (ref 20–33)
CREAT SERPL-MCNC: 0.56 MG/DL (ref 0.5–1.4)
EOSINOPHIL # BLD AUTO: 0.25 K/UL (ref 0–0.51)
EOSINOPHIL NFR BLD: 4.7 % (ref 0–6.9)
ERYTHROCYTE [DISTWIDTH] IN BLOOD BY AUTOMATED COUNT: 39.7 FL (ref 35.9–50)
GFR SERPLBLD CREATININE-BSD FMLA CKD-EPI: 104 ML/MIN/1.73 M 2
GLUCOSE SERPL-MCNC: 82 MG/DL (ref 65–99)
HCT VFR BLD AUTO: 40.8 % (ref 37–47)
HDLC SERPL-MCNC: 62 MG/DL
HGB BLD-MCNC: 13.4 G/DL (ref 12–16)
IMM GRANULOCYTES # BLD AUTO: 0.01 K/UL (ref 0–0.11)
IMM GRANULOCYTES NFR BLD AUTO: 0.2 % (ref 0–0.9)
LDLC SERPL CALC-MCNC: 46 MG/DL
LYMPHOCYTES # BLD AUTO: 1.85 K/UL (ref 1–4.8)
LYMPHOCYTES NFR BLD: 35.1 % (ref 22–41)
MAGNESIUM SERPL-MCNC: 2.1 MG/DL (ref 1.5–2.5)
MCH RBC QN AUTO: 28.6 PG (ref 27–33)
MCHC RBC AUTO-ENTMCNC: 32.8 G/DL (ref 33.6–35)
MCV RBC AUTO: 87.2 FL (ref 81.4–97.8)
MONOCYTES # BLD AUTO: 0.55 K/UL (ref 0–0.85)
MONOCYTES NFR BLD AUTO: 10.4 % (ref 0–13.4)
NEUTROPHILS # BLD AUTO: 2.56 K/UL (ref 2–7.15)
NEUTROPHILS NFR BLD: 48.7 % (ref 44–72)
NRBC # BLD AUTO: 0 K/UL
NRBC BLD-RTO: 0 /100 WBC
PHOSPHATE SERPL-MCNC: 4.2 MG/DL (ref 2.5–4.5)
PLATELET # BLD AUTO: 234 K/UL (ref 164–446)
PMV BLD AUTO: 10.9 FL (ref 9–12.9)
POTASSIUM SERPL-SCNC: 3.9 MMOL/L (ref 3.6–5.5)
RBC # BLD AUTO: 4.68 M/UL (ref 4.2–5.4)
SODIUM SERPL-SCNC: 140 MMOL/L (ref 135–145)
TRIGL SERPL-MCNC: 46 MG/DL (ref 0–149)
WBC # BLD AUTO: 5.3 K/UL (ref 4.8–10.8)

## 2022-03-25 PROCEDURE — 80061 LIPID PANEL: CPT

## 2022-03-25 PROCEDURE — 96372 THER/PROPH/DIAG INJ SC/IM: CPT

## 2022-03-25 PROCEDURE — 80069 RENAL FUNCTION PANEL: CPT

## 2022-03-25 PROCEDURE — 85025 COMPLETE CBC W/AUTO DIFF WBC: CPT

## 2022-03-25 PROCEDURE — 700102 HCHG RX REV CODE 250 W/ 637 OVERRIDE(OP): Performed by: STUDENT IN AN ORGANIZED HEALTH CARE EDUCATION/TRAINING PROGRAM

## 2022-03-25 PROCEDURE — 99217 PR OBSERVATION CARE DISCHARGE: CPT | Performed by: STUDENT IN AN ORGANIZED HEALTH CARE EDUCATION/TRAINING PROGRAM

## 2022-03-25 PROCEDURE — A9270 NON-COVERED ITEM OR SERVICE: HCPCS | Performed by: STUDENT IN AN ORGANIZED HEALTH CARE EDUCATION/TRAINING PROGRAM

## 2022-03-25 PROCEDURE — 94760 N-INVAS EAR/PLS OXIMETRY 1: CPT

## 2022-03-25 PROCEDURE — A9270 NON-COVERED ITEM OR SERVICE: HCPCS | Performed by: INTERNAL MEDICINE

## 2022-03-25 PROCEDURE — 700111 HCHG RX REV CODE 636 W/ 250 OVERRIDE (IP): Performed by: INTERNAL MEDICINE

## 2022-03-25 PROCEDURE — 70551 MRI BRAIN STEM W/O DYE: CPT

## 2022-03-25 PROCEDURE — G0378 HOSPITAL OBSERVATION PER HR: HCPCS

## 2022-03-25 PROCEDURE — 83735 ASSAY OF MAGNESIUM: CPT

## 2022-03-25 PROCEDURE — 700102 HCHG RX REV CODE 250 W/ 637 OVERRIDE(OP): Performed by: INTERNAL MEDICINE

## 2022-03-25 RX ORDER — ALPRAZOLAM 0.25 MG/1
0.25 TABLET ORAL
Status: DISCONTINUED | OUTPATIENT
Start: 2022-03-25 | End: 2022-03-25

## 2022-03-25 RX ORDER — ALPRAZOLAM 0.5 MG/1
0.5 TABLET ORAL
Status: COMPLETED | OUTPATIENT
Start: 2022-03-25 | End: 2022-03-25

## 2022-03-25 RX ADMIN — ASPIRIN 325 MG ORAL TABLET 325 MG: 325 PILL ORAL at 05:31

## 2022-03-25 RX ADMIN — LEVOTHYROXINE SODIUM 150 MCG: 0.07 TABLET ORAL at 05:31

## 2022-03-25 RX ADMIN — ALPRAZOLAM 0.5 MG: 0.5 TABLET ORAL at 10:49

## 2022-03-25 RX ADMIN — ENOXAPARIN SODIUM 40 MG: 40 INJECTION SUBCUTANEOUS at 05:31

## 2022-03-25 NOTE — PROGRESS NOTES
Tele strip at 1705 shows Sinus Rhythm     Measurements from am strip were as follows:  LA=.20  QRS=.08  QT=.40     Tele Shift Summary:     Rhythm : Sinus rhythm  Rate : 70  Ectopy : Per CCT Joon: Rare PVC     Telemetry monitoring strips placed in pt's chart.

## 2022-03-25 NOTE — CARE PLAN
The patient is Watcher - Medium risk of patient condition declining or worsening    Shift Goals  Clinical Goals: MRI in the morning, monitor stroke s/s, monitor left sided facial droop  Patient Goals: no facial droop, go home    Progress made toward(s) clinical / shift goals:  Patient's MRI checklist completed, left sided facial droop is minimal. No changes. Patient resting throughout the night.     Problem: Neuro Status  Goal: Neuro status will remain stable or improve  3/25/2022 0326 by Sujey Diaz R.N.  Outcome: Progressing  3/25/2022 0326 by ARISTIDES Cruz.N.  Outcome: Progressing     Problem: Urinary Elimination  Goal: Establish and maintain regular urinary output  3/25/2022 0326 by ROSA CruzN.  Outcome: Progressing  3/25/2022 0326 by ARISTIDES Cruz.N.  Outcome: Progressing     Problem: Mobility - Stroke  Goal: Patient's capacity to carry out activities will improve  3/25/2022 0326 by ROSA CruzN.  Outcome: Progressing  3/25/2022 0326 by ARISTIDES Cruz.N.  Outcome: Progressing     Problem: Self Care  Goal: Patient will have the ability to perform ADLs independently or with assistance (bathe, groom, dress, toilet and feed)  Outcome: Progressing     Problem: Knowledge Deficit - Standard  Goal: Patient and family/care givers will demonstrate understanding of plan of care, disease process/condition, diagnostic tests and medications  3/25/2022 0326 by Sujey Diaz R.N.  Outcome: Progressing  3/25/2022 0326 by Sujey Diaz R.N.  Outcome: Progressing     Problem: Pain - Standard  Goal: Alleviation of pain or a reduction in pain to the patient’s comfort goal  3/25/2022 0326 by ARISTIDES Cruz.N.  Outcome: Progressing  3/25/2022 0326 by Sujey Diaz R.N.  Outcome: Progressing       Patient is not progressing towards the following goals: none

## 2022-03-25 NOTE — CARE PLAN
The patient is Watcher - Medium risk of patient condition declining or worsening    Shift Goals  Clinical Goals: MRI  Patient Goals: no facial droop    Progress made toward(s) clinical / shift goals:    Problem: Optimal Care of the Stroke Patient  Goal: Optimal emergency care for the stroke patient  Outcome: Progressing  Note: Pt will get MRI for rule out stroke during hospitalization.      Problem: Neuro Status  Goal: Neuro status will remain stable or improve  Outcome: Progressing  Note: Neuro checks will be completed every 4 hours per orders during hospitalization.       Patient is not progressing towards the following goals:

## 2022-03-25 NOTE — PROGRESS NOTES
4 Eyes Skin Assessment Completed by SHANTHI Carrillo and SHANTHI Christianson.    Head WDL  Ears WDL  Nose WDL  Mouth WDL  Neck WDL  Breast/Chest WDL  Shoulder Blades WDL  Spine WDL  (R) Arm/Elbow/Hand WDL  (L) Arm/Elbow/Hand WDL  Abdomen WDL  Groin WDL  Scrotum/Coccyx/Buttocks WDL  (R) Leg WDL  (L) Leg WDL  (R) Heel/Foot/Toe WDL  (L) Heel/Foot/Toe WDL          Devices In Places Tele Box      Interventions In Place Q2 Turns and Pressure Redistribution Mattress    Possible Skin Injury No    Pictures Uploaded Into Epic N/A  Wound Consult Placed N/A  RN Wound Prevention Protocol Ordered No

## 2022-03-25 NOTE — DISCHARGE PLANNING
Fatoumata is not requiring 2 of 3 disciplines.  [facility] will no longer follow.  Please reach out to myself with any interval changes/questions.

## 2022-03-25 NOTE — PROGRESS NOTES
Telemetry Shift Summary    Rhythm SR  HR Range 62-70  Ectopy rPVC  Measurements 0.24/0.08/0.40      Normal Values  Rhythm SR  HR Range:   Measurements: 0.12-0.20/0.06-0.10/0.30-0.52

## 2022-03-25 NOTE — PROGRESS NOTES
Report received from Lorena MAKI. Patient is resting in bed at time of report. No signs of labored breathing or discomfort. No needs at this time. Safety measures in place and call light/water within reach.

## 2022-03-25 NOTE — PROGRESS NOTES
Patient discharged with family via wheelchair. Discharge instructions provided, patient verbalized understanding. PIV removed, tele box returned to monitor tech. VS stable. No distress noted.

## 2022-03-25 NOTE — DISCHARGE INSTRUCTIONS
Discharge Instructions    Discharged to home by car with relative. Discharged via wheelchair, hospital escort: Yes.  Special equipment needed: Not Applicable    Be sure to schedule a follow-up appointment with your primary care doctor or any specialists as instructed.     Discharge Plan:   Diet Plan: Discussed  Activity Level: Discussed  Confirmed Follow up Appointment: Appointment Scheduled  Confirmed Symptoms Management: Discussed  Medication Reconciliation Updated: Yes  Influenza Vaccine Indication: Patient Refuses    I understand that a diet low in cholesterol, fat, and sodium is recommended for good health. Unless I have been given specific instructions below for another diet, I accept this instruction as my diet prescription.   Other diet: regular    Special Instructions: None    · Is patient discharged on Warfarin / Coumadin?   No     Depression / Suicide Risk    As you are discharged from this [facility] facility, it is important to learn how to keep safe from harming yourself.    Recognize the warning signs:  · Abrupt changes in personality, positive or negative- including increase in energy   · Giving away possessions  · Change in eating patterns- significant weight changes-  positive or negative  · Change in sleeping patterns- unable to sleep or sleeping all the time   · Unwillingness or inability to communicate  · Depression  · Unusual sadness, discouragement and loneliness  · Talk of wanting to die  · Neglect of personal appearance   · Rebelliousness- reckless behavior  · Withdrawal from people/activities they love  · Confusion- inability to concentrate     If you or a loved one observes any of these behaviors or has concerns about self-harm, here's what you can do:  · Talk about it- your feelings and reasons for harming yourself  · Remove any means that you might use to hurt yourself (examples: pills, rope, extension cords, firearm)  · Get professional help from the community (Mental Health,  "Substance Abuse, psychological counseling)  · Do not be alone:Call your Safe Contact- someone whom you trust who will be there for you.  · Call your local CRISIS HOTLINE 434-2470 or 312-619-1675  · Call your local Children's Mobile Crisis Response Team [place] (343) 337-3774 or www.[employer]  · Call the toll free National Suicide Prevention Hotlines   · National Suicide Prevention Lifeline 132-147-EDHX (8820)  · [employer] Line Network 800-SUICIDE (176-3029)      Transient Ischemic Attack    A transient ischemic attack (TIA) is a \"warning stroke\" that causes stroke-like symptoms that go away quickly. A TIA does not cause lasting damage to the brain. But having a TIA is a sign that you may be at risk for a stroke. Lifestyle changes and medical treatments can help prevent a stroke.  It is important to know the symptoms of a TIA and what to do. Get help right away, even if your symptoms go away. The symptoms of a TIA are the same as those of a stroke. They can happen fast, and they usually go away within minutes or hours. They can include:  · Weakness or loss of feeling in your face, arm, or leg. This often happens on one side of your body.  · Trouble walking.  · Trouble moving your arms or legs.  · Trouble talking or understanding what people are saying.  · Trouble seeing.  · Seeing two of one object (double vision).  · Feeling dizzy.  · Feeling confused.  · Loss of balance or coordination.  · Feeling sick to your stomach (nauseous) and throwing up (vomiting).  · A very bad headache for no reason.  What increases the risk?  Certain things may make you more likely to have a TIA. Some of these are things that you can change, such as:  · Being very overweight (obese).  · Using products that contain nicotine or tobacco, such as cigarettes and e-cigarettes.  · Taking birth control pills.  · Not being active.  · Drinking too much alcohol.  · Using drugs.  Other risk factors include:  · Having an irregular " heartbeat (atrial fibrillation).  · Being  or .  · Having had blood clots, stroke, TIA, or heart attack in the past.  · Being a woman with a history of high blood pressure in pregnancy (preeclampsia).  · Being over the age of 60.  · Being male.  · Having family history of stroke.  · Having the following diseases or conditions:  ? High blood pressure.  ? High cholesterol.  ? Diabetes.  ? Heart disease.  ? Sickle cell disease.  ? Sleep apnea.  ? Migraine headache.  ? Long-term (chronic) diseases that cause soreness and swelling (inflammation).  ? Disorders that affect how your blood clots.  Follow these instructions at home:  Medicines    · Take over-the-counter and prescription medicines only as told by your doctor.  · If you were told to take aspirin or another medicine to thin your blood, take it exactly as told by your doctor.  ? Taking too much of the medicine can cause bleeding.  ? Taking too little of the medicine may not work to treat the problem.  Eating and drinking    · Eat 5 or more servings of fruits and vegetables each day.  · Follow instructions from your doctor about your diet. You may need to follow a certain diet to help lower your risk of having a stroke. You may need to:  ? Eat a diet that is low in fat and salt.  ? Eat foods that contain a lot of fiber.  ? Limit the amount of carbohydrates and sugar in your diet.  · Limit alcohol intake to 1 drink a day for nonpregnant women and 2 drinks a day for men. One drink equals 12 oz of beer, 5 oz of wine, or 1½ oz of hard liquor.  General instructions  · Keep a healthy weight.  · Stay active. Try to get at least 30 minutes of activity on all or most days.  · Find out if you have a condition called sleep apnea. Get treatment if needed.  · Do not use any products that contain nicotine or tobacco, such as cigarettes and e-cigarettes. If you need help quitting, ask your doctor.  · Do not abuse drugs.  · Keep all follow-up visits as  "told by your doctor. This is important.  Get help right away if:  · You have any signs of stroke. \"BE FAST\" is an easy way to remember the main warning signs:  ? B - Balance. Signs are dizziness, sudden trouble walking, or loss of balance.  ? E - Eyes. Signs are trouble seeing or a sudden change in how you see.  ? F - Face. Signs are sudden weakness or loss of feeling of the face, or the face or eyelid drooping on one side.  ? A - Arms. Signs are weakness or loss of feeling in an arm. This happens suddenly and usually on one side of the body.  ? S - Speech. Signs are sudden trouble speaking, slurred speech, or trouble understanding what people say.  ? T - Time. Time to call emergency services. Write down what time symptoms started.  · You have other signs of stroke, such as:  ? A sudden, very bad headache with no known cause.  ? Feeling sick to your stomach (nausea).  ? Throwing up (vomiting).  ? Jerky movements that you cannot control (seizure).  These symptoms may be an emergency. Do not wait to see if the symptoms will go away. Get medical help right away. Call your local emergency services (911 in the [place] Do not drive yourself to the hospital.  Summary  · A transient ischemic attack (TIA) is a \"warning stroke\" that causes stroke-like symptoms that go away quickly.  · A TIA is a medical emergency. Get help right away, even if your symptoms go away.  · A TIA does not cause lasting damage to the brain.  · Having a TIA is a sign that you may be at risk for a stroke. Lifestyle changes and medical treatments can help prevent a stroke.  This information is not intended to replace advice given to you by your health care provider. Make sure you discuss any questions you have with your health care provider.  Document Released: 09/26/2009 Document Revised: 09/13/2019 Document Reviewed: 03/21/2018  Elsevier Patient Education © 2020 [employer]    Discharge Instructions per Luana Romero M.D.    Please follow-up with " PCP as outpatient in one week  Please take ASA 81mg daily along with your home medications. Bp goal normotensive below 140mmHg systolic    DIET: cardiac diet    ACTIVITY: As tolerated    DIAGNOSIS: left facial droop resolved, negative for MRI    Return to ER in the event of new or worsening symptoms. Please note importance of compliance and the patient has agreed to proceed with all medical recommendations and follow up plan indicated above. All medications come with benefits and risks. Risks may include permanent injury or death and these risks can be minimized with close reassessment and monitoring. Please make it to your scheduled follow ups with PCP

## 2022-03-25 NOTE — DISCHARGE SUMMARY
Discharge Summary    CHIEF COMPLAINT ON ADMISSION  Chief Complaint   Patient presents with   • Possible Stroke     Son noticed a L facial droop and a R eye droop when pt awoke this am, pt A & 0 x 4, ambulates well, moves all ext, speech clear       Reason for Admission  stroke like symptoms; sent by MD     Admission Date  3/24/2022    CODE STATUS  Full Code    HPI & HOSPITAL COURSE  62 y.o. female with a history of hypertension who presented 3/24/2022 with left facial droop. She was in her normal state health upon going to bed last night.  When she woke up this morning her son noted that the left side of her face appeared to be droopy.  He thought that her left eye was not opening normally and the left side of her mouth was curving down.  She did not experience any slurred speech, headache, tingling, numbness or weakness of her extremities.  She has never had similar symptoms in the past.  Due to concern for stroke they came to the ER for evaluation.     CTA head and neck negative, CT perfusion scan negative. MRI negative. Lipid profile and A1c normal. Patient's L facial droop has been resolved. PT/OT/SLP and PMR evaluated the patient and no further needs. Her symptoms are likely TIA vs. Belly palsy. Given patient's history of HTN and hyperlipidemia, I advised patient to start ASA 81mg and continue HTN control.  Patient is advised to follow up with PCP in one week. They voiced understanding.       Therefore, she is discharged in good and stable condition to home with close outpatient follow-up.    The patient recovered much more quickly than anticipated on admission.    Discharge Date  3/25/2022    FOLLOW UP ITEMS POST DISCHARGE  PCP    DISCHARGE DIAGNOSES  Principal Problem:    TIA (transient ischemic attack) POA: Yes  Active Problems:    Esophageal reflux POA: Yes    Essential hypertension POA: Yes    Hypothyroid POA: Yes  Resolved Problems:    * No resolved hospital problems. *      FOLLOW UP  No future  "appointments.  Messi Patel M.D.  [address] [place] [id number] [phone]    In 1 week        MEDICATIONS ON DISCHARGE     Medication List      START taking these medications      Instructions   aspirin 81 MG EC tablet   Take 1 Tablet by mouth every day for 30 days.  Dose: 81 mg        CONTINUE taking these medications      Instructions   levothyroxine 150 MCG Tabs  Commonly known as: SYNTHROID   Take 1 Tablet by mouth every morning on an empty stomach.  Dose: 150 mcg     losartan 25 MG Tabs  Commonly known as: COZAAR   Take 25 mg by mouth every day.  Dose: 25 mg     rosuvastatin 10 MG Tabs  Commonly known as: CRESTOR   Take 1 Tablet by mouth every day.  Dose: 10 mg            Allergies  Allergies   Allergen Reactions   • Prednisone Hives and Rash     \"hives and rash\"   • Promethazine Rash     Possible rash, not confirmed   • Tape Rash     Paper tape,other tapes give her rash   • Latex Rash     Latex gloves only-rash         DIET  Orders Placed This Encounter   Procedures   • Diet Order Diet: Cardiac     Standing Status:   Standing     Number of Occurrences:   1     Order Specific Question:   Diet:     Answer:   Cardiac [6]       ACTIVITY  As tolerated.  Weight bearing as tolerated    CONSULTATIONS  none    PROCEDURES  none    LABORATORY  Lab Results   Component Value Date    SODIUM 140 03/25/2022    POTASSIUM 3.9 03/25/2022    CHLORIDE 107 03/25/2022    CO2 23 03/25/2022    GLUCOSE 82 03/25/2022    BUN 12 03/25/2022    CREATININE 0.56 03/25/2022        Lab Results   Component Value Date    WBC 5.3 03/25/2022    HEMOGLOBIN 13.4 03/25/2022    HEMATOCRIT 40.8 03/25/2022    PLATELETCT 234 03/25/2022      MR-BRAIN-W/O   Final Result         1.  No acute abnormality.   2.  Age-appropriate mild cerebral volume loss.      DX-CHEST-PORTABLE (1 VIEW)   Final Result      No acute cardiopulmonary abnormality identified.      CT-CTA NECK WITH & W/O-POST PROCESSING   Final Result      1.  Mild " atherosclerosis. Resulting stenosis is less than 50%.      CT-CTA HEAD WITH & W/O-POST PROCESS   Final Result      No large vessel occlusion or thrombus is identified.      CT-CEREBRAL PERFUSION ANALYSIS   Final Result      1.  Cerebral blood flow less than 30% likely representing completed infarct = 0 mL.      2.  T Max more than 6 seconds likely representing combination of completed infarct and ischemia = 730 mL, likely artifactual.      3.  Mismatched volume likely representing ischemic brain/penumbra = 730 mL, likely artifactual      4.  Please note that the cerebral perfusion was performed on the limited brain tissue around the basal ganglia region. Infarct/ischemia outside the CT perfusion sections can be missed in this study.      CT-HEAD W/O   Final Result      No acute intracranial findings.                   Total time of the discharge process exceeds 33 minutes.

## 2022-03-26 RX ORDER — ASPIRIN 81 MG/1
81 TABLET ORAL DAILY
Qty: 30 TABLET | Refills: 0
Start: 2022-03-26 | End: 2022-04-25

## 2022-03-29 ENCOUNTER — OFFICE VISIT (OUTPATIENT)
Dept: MEDICAL GROUP | Facility: MEDICAL CENTER | Age: 62
End: 2022-03-29
Payer: COMMERCIAL

## 2022-03-29 VITALS
TEMPERATURE: 98.1 F | BODY MASS INDEX: 35.61 KG/M2 | WEIGHT: 201 LBS | HEART RATE: 74 BPM | SYSTOLIC BLOOD PRESSURE: 148 MMHG | OXYGEN SATURATION: 95 % | DIASTOLIC BLOOD PRESSURE: 82 MMHG | HEIGHT: 63 IN

## 2022-03-29 DIAGNOSIS — R06.83 SNORING: ICD-10-CM

## 2022-03-29 DIAGNOSIS — E66.01 SEVERE OBESITY (BMI 35.0-35.9 WITH COMORBIDITY) (HCC): ICD-10-CM

## 2022-03-29 DIAGNOSIS — G45.9 TIA (TRANSIENT ISCHEMIC ATTACK): ICD-10-CM

## 2022-03-29 DIAGNOSIS — I10 ESSENTIAL HYPERTENSION: ICD-10-CM

## 2022-03-29 PROCEDURE — 99214 OFFICE O/P EST MOD 30 MIN: CPT | Performed by: FAMILY MEDICINE

## 2022-03-29 RX ORDER — LOSARTAN POTASSIUM 50 MG/1
50 TABLET ORAL DAILY
Qty: 90 TABLET | Refills: 1 | Status: SHIPPED | OUTPATIENT
Start: 2022-03-29 | End: 2022-06-30 | Stop reason: SDUPTHER

## 2022-03-29 ASSESSMENT — FIBROSIS 4 INDEX: FIB4 SCORE: 1.26

## 2022-03-29 NOTE — PROGRESS NOTES
"  Subjective:     Fatoumata Redding is a 62 y.o. female presenting for a hospital follow-up.  Was admitted 3/24-3/25 with a TIA.  She reports waking up noticed left facial droop and left facial numbness.  Her symptoms have mostly resolved.  She continues to have an small area of numbness on her left cheek.  Work-up at the hospital was unremarkable.  She has started a baby aspirin.  She continues on losartan, rosuvastatin, levothyroxine regularly.  She reports that she sees cardiology about once a year.  Initially started seeing her cardiologist for palpitations.  She reports that she had a treadmill test, Holter monitor last year that was normal.        Current Outpatient Medications:   •  losartan (COZAAR) 50 MG Tab, Take 1 Tablet by mouth every day., Disp: 90 Tablet, Rfl: 1  •  aspirin 81 MG EC tablet, Take 1 Tablet by mouth every day for 30 days., Disp: 30 Tablet, Rfl: 0  •  rosuvastatin (CRESTOR) 10 MG Tab, Take 1 Tablet by mouth every day., Disp: 90 Tablet, Rfl: 3  •  levothyroxine (SYNTHROID) 150 MCG Tab, Take 1 Tablet by mouth every morning on an empty stomach., Disp: 90 Tablet, Rfl: 3    Objective:     Vitals: /82   Pulse 74   Temp 36.7 °C (98.1 °F) (Temporal)   Ht 1.6 m (5' 3\")   Wt 91.2 kg (201 lb)   SpO2 95%   BMI 35.61 kg/m²   General: Alert  HEENT: Normocephalic.   Heart: Regular rate and rhythm.  S1 and S2 normal.  No murmurs appreciated.  No carotid bruits  Respiratory: Normal respiratory effort.  Clear to auscultation bilaterally.  Abdomen: Non-distended, soft  Extremities: No leg edema.    Assessment/Plan:     Fatoumata was seen today for follow-up.    Diagnoses and all orders for this visit:    TIA (transient ischemic attack)  Acute, uncomplicated issue.  Hospitalization records reviewed.  We will check an echocardiogram.  Continue with a baby aspirin.  We will increase her losartan.  We will also refer her for a sleep study.  Recommended that she follow-up with her cardiologist as well.  -     " EC-ECHOCARDIOGRAM COMPLETE W/O CONT; Future  -     Referral to Pulmonary and Sleep Medicine    Essential hypertension  Chronic, slightly uncontrolled today.  She reports that her blood pressures at home are usually in the 130 systolic.  We will increase her losartan to 50 mg daily.  She has a follow-up appointment with her PCP in 3 weeks.  -     EC-ECHOCARDIOGRAM COMPLETE W/O CONT; Future  -     losartan (COZAAR) 50 MG Tab; Take 1 Tablet by mouth every day.    Snoring  Chronic, will refer for sleep study  -     Referral to Pulmonary and Sleep Medicine    Severe obesity (BMI 35.0-35.9 with comorbidity) (HCC)  -     Referral to Pulmonary and Sleep Medicine  -     Patient identified as having weight management issue.  Appropriate orders and counseling given.

## 2022-03-30 NOTE — PROGRESS NOTES
Chief Complaint   Patient presents with   • New Patient     Stroke       Problem List Items Addressed This Visit    None     Visit Diagnoses     Transient neurological symptoms              History of present illness:  Fatoumata Redding 62 y.o. female presents today for [facility] f/u. PMHx: HTN, HLD, Hypothyroidism.     Pt presented to the hospital on 3/24/22 for c/o L facial droop and numbness. No other stroke symptoms. The facial droop resolved prior to discharge that same day but she states that the numbness didn't go away until days later. Stroke work-up was negative and patient was recommended to start taking asa 81mg daily.  She is already on crestor. She is back to baseline now.     She had seen PCP who ordered ECHO, ref to cardiology and pulmonary for sleep study. Echo was not done yet.     Has family hx of stroke.     Never smoked. Rarely drinks alcohol. No recreational drug use.         Past medical history:   Past Medical History:   Diagnosis Date   • Hyperlipidemia     in her 20s   • Hypertension     one meds at one point prior to weight loss surgery   • palpitations     resolved with weight loss surgery   • Thyroid disease        Past surgical history:   Past Surgical History:   Procedure Laterality Date   • ABDOMINAL EXPLORATION  2013    gastric bypass   • ABDOMINAL EXPLORATION  2011    lap band   • CHOLECYSTECTOMY     • PRIMARY C SECTION      c/s x 2       Family history:   Family History   Problem Relation Age of Onset   • Cancer Mother 79        ovarian CA  pt is BRCA 1 and 2 negative   • Cancer Father 74        lung ca; smoker   • Stroke Father    • Cancer Sister 55        Brca -metastatic   • Cancer Sister         melanoma face, in remission   • Diabetes Sister    • Diabetes Maternal Grandmother    • Cancer Paternal Grandfather         lung Ca smoker   • Heart Disease Neg Hx        Social history:   Social History     Socioeconomic History   • Marital status:      Spouse name: Not  "on file   • Number of children: Not on file   • Years of education: Not on file   • Highest education level: Associate degree: occupational, technical, or vocational program   Occupational History   • Occupation: homemaker   Tobacco Use   • Smoking status: Never Smoker   • Smokeless tobacco: Never Used   Vaping Use   • Vaping Use: Never used   Substance and Sexual Activity   • Alcohol use: Yes     Comment: 1 glass wine/week   • Drug use: Never   • Sexual activity: Yes     Partners: Male     Comment:    Other Topics Concern   • Not on file   Social History Narrative   • Not on file     Social Determinants of Health     Financial Resource Strain: Not on file   Food Insecurity: Not on file   Transportation Needs: Not on file   Physical Activity: Not on file   Stress: Not on file   Social Connections: Not on file   Intimate Partner Violence: Not on file   Housing Stability: Not on file       Current medications:   Current Outpatient Medications   Medication   • losartan (COZAAR) 50 MG Tab   • aspirin 81 MG EC tablet   • rosuvastatin (CRESTOR) 10 MG Tab   • levothyroxine (SYNTHROID) 150 MCG Tab     No current facility-administered medications for this visit.       Medication Allergy:  Allergies   Allergen Reactions   • Prednisone Hives and Rash     \"hives and rash\"   • Promethazine Rash     Possible rash, not confirmed   • Tape Rash     Paper tape,other tapes give her rash   • Latex Rash     Latex gloves only-rash         Review of systems:   General: Denies fevers or chills, or nightsweats, or generalized fatigue.    Head: Denies headaches or dizziness or lightheadedness  EENT: Denies vision changes, vision loss or pain, nasal secretion, nasal bleeding, difficulty swallowing, hearing loss, tinnitus, vertigo, ear pain  Respiratory: Denies shortness of breath, cough, sputum, or wheezing  Cardiac: Denies chest pain, palpitations, edema or syncope  Gastrointestinal: Denies nausea, vomiting, no abdominal pain or " "change in bowel habits, no melena or hematochezia  Urinary: Denies dysuria, frequency, hesitancy, or incontinence.  Dermatologic:  Denies new rash  Musculoskeletal: Denies muscle pain or swelling, no atrophy, no neck and back pain or stiffness.   Neurologic: Denies  muscle weakness (focal or generalized), ataxia, change in speech or language, memory loss, abnormal movements, seizures, loss of consciousness, or episodes of confusion. +L facial droop and numbness  Psychiatric: Denies anxiety, depression, mood swings, suicidal or homicidal thoughts       Physical examination:   Vitals:    04/14/22 0914   BP: 136/70   BP Location: Left arm   Patient Position: Sitting   BP Cuff Size: Large adult   Pulse: 75   Temp: 36.6 °C (97.8 °F)   TempSrc: Temporal   SpO2: 98%   Weight: 102 kg (225 lb 1.4 oz)   Height: 1.6 m (5' 3\")     General: Patient in no acute distress, pleasant and cooperative.  HEENT: Normocephalic, no signs of acute trauma.   moist conjunctivae. Nares are patent. Oropharynx clear without lesions and normal  hard and soft palates.   Neck: Supple, No carotid bruits. There is normal range of motion.  Resp: clear to auscultation bilaterally. No wheezes or crackles.   CV: RRR, no murmurs.   Skin: no signs of acute rashes or trauma.   Musculoskeletal: joints exhibit full range of motion  Psychiatric: No hallucinatory behavior. No symptoms of depression or suicidal ideation. Mood and affect appear normal on exam.     NEUROLOGICAL EXAM:   Mental status, orientation: Awake, alert and fully oriented.   Speech and language: speech is clear and fluent. The patient is able to name, repeat and comprehend.   Memory: There is intact recollection of recent and remote events.   Cranial nerve exam:   CN I: Not examined   CN II: PERRL.   CN III, IV, VI: EOMI; no nystagmus   CN V: Facial sensation intact bilaterally   CN VII: face symmetric   CN VIII: hearing intact to finger rub bilaterally   CN IX, X: palate elevates " symmetrically   CN XI: Symmetric shoulder shrug  CN XII: tongue midline.    Motor exam: Strength is 5/5 in all extremities. Tone is normal. No abnormal movements were seen on exam.   Sensory exam reveals normal sense of light touch in all extremities.   Deep tendon reflexes:  2+ throughout.  Coordination: Normal rapidly alternating movements.   Gait: The patient was able to get up from seated position on first attempt without requiring assistance. Found to be steady when walking. Movements were fluid with normal arm swing. The patient was able to turn without difficulties or tendency to fall. Romberg exam unremarkable.      ANCILLARY DATA REVIEWED:     Lab Data Review:  Reviewed in chart. CBC, CMP, Lipid, a1c  Records reviewed:  Reviewed in chart.    Imaging:  MRI brain 3/25/22  1.  No acute abnormality.  2.  Age-appropriate mild cerebral volume loss.    CTA neck 3/24/22  1.  Mild atherosclerosis. Resulting stenosis is less than 50%.    CTA head 3/24/22  No large vessel occlusion or thrombus is identified.    CT head 3/24/22  No acute intracranial findings.      ASSESSMENT AND PLAN:    1. Transient neurological symptoms    2. Hospital discharge follow-up          CLINICAL DECISION:  Transient neurologic symptoms of unknown etiology. Negative for stroke. Vessel imaging unremarkable. Symptom of numbness lasted >24hr which makes TIA questionable. However, she does have risk factors for TIA/stroke that are well controlled including HTN, HLD (LDL 46) and obesity.       Plan:  -continue baby asa and statin.     -Echo pending. Pt to schedule.     -Education given to help control risk factors including:  /80, LDL 70 or less, HBA1C 7 or less (5.4).     -Needs close fu with PCP for optimal control of risk factors.     -Discussed lifestyle modification including diet, exercise and weight loss.  Limiting intake of sugar and carbohydrates.      FOLLOW-UP:   Return No further f/u..        EDUCATION AND  COUNSELING:  -Usefulness of anti-platelets in secondary stroke prevention was discussed. The side effects, including increased risk for bleeding (both traumatic and spontaneous) were reviewed with the patient at length.   -Recommended moderate intensity aerobic activity for at least 10 minutes 4 times per week for vigorous intensity aerobic activity for at least 20 minutes twice per week   -Recommended goal for LDL is 70 or less. Side effects of statin, including idiosyncratic reactions as well as more common myalgias, and liver injury  were discussed. Monitoring of LFT’s will be deferred to the patient’s primary care physician.   -Secondary stroke prevention education was provided, including; lifestyle modification with healthy diet, and exercise, as well as recommendations for optimal control of risk factors.   -Close follow up with PCP is recommended.   -Compliance with medications was discussed in detail.   -Smoking cessation education was provided for greater than 3 minutes.   -The patient/family was educated on recognition of stroke symptoms. The patient/family instructed to call 911 EMERGENTLY upon presentation of any of these symptoms/signs, to be taken to nearest emergency department for evaluation and acute care.       Irasema Ayala, MSN, APRN, FNP-C  [facility]  Office: [phone]  Fax: [phone]    BILLING DOCUMENTATION:   Total time spent including chart review before and after visit was 40min. Over 50% of the time of the visit today was spent on counseling and or coordination of care wtih the patient and/or family, with greater than 50% of the total discussing my assessment and plan as stated above.

## 2022-04-14 ENCOUNTER — OFFICE VISIT (OUTPATIENT)
Dept: NEUROLOGY | Facility: MEDICAL CENTER | Age: 62
End: 2022-04-14
Attending: NURSE PRACTITIONER
Payer: COMMERCIAL

## 2022-04-14 VITALS
TEMPERATURE: 97.8 F | WEIGHT: 225.09 LBS | DIASTOLIC BLOOD PRESSURE: 70 MMHG | BODY MASS INDEX: 39.88 KG/M2 | HEIGHT: 63 IN | SYSTOLIC BLOOD PRESSURE: 136 MMHG | OXYGEN SATURATION: 98 % | HEART RATE: 75 BPM

## 2022-04-14 DIAGNOSIS — R29.818 TRANSIENT NEUROLOGICAL SYMPTOMS: ICD-10-CM

## 2022-04-14 DIAGNOSIS — Z09 HOSPITAL DISCHARGE FOLLOW-UP: ICD-10-CM

## 2022-04-14 PROCEDURE — 99204 OFFICE O/P NEW MOD 45 MIN: CPT | Performed by: NURSE PRACTITIONER

## 2022-04-14 PROCEDURE — 99211 OFF/OP EST MAY X REQ PHY/QHP: CPT | Performed by: NURSE PRACTITIONER

## 2022-04-14 ASSESSMENT — FIBROSIS 4 INDEX: FIB4 SCORE: 1.26

## 2022-04-21 ENCOUNTER — OFFICE VISIT (OUTPATIENT)
Dept: MEDICAL GROUP | Facility: MEDICAL CENTER | Age: 62
End: 2022-04-21
Payer: COMMERCIAL

## 2022-04-21 VITALS
DIASTOLIC BLOOD PRESSURE: 78 MMHG | HEIGHT: 63 IN | OXYGEN SATURATION: 98 % | RESPIRATION RATE: 16 BRPM | SYSTOLIC BLOOD PRESSURE: 130 MMHG | BODY MASS INDEX: 40.04 KG/M2 | TEMPERATURE: 97.7 F | WEIGHT: 226 LBS | HEART RATE: 67 BPM

## 2022-04-21 DIAGNOSIS — Z91.89 AT RISK FOR BREAST CANCER: ICD-10-CM

## 2022-04-21 DIAGNOSIS — Z86.73 HISTORY OF TRANSIENT ISCHEMIC ATTACK (TIA): ICD-10-CM

## 2022-04-21 DIAGNOSIS — I65.23 BILATERAL CAROTID ARTERY STENOSIS: ICD-10-CM

## 2022-04-21 DIAGNOSIS — I65.23 CAROTID ATHEROSCLEROSIS, BILATERAL: ICD-10-CM

## 2022-04-21 DIAGNOSIS — E06.3 HASHIMOTO'S THYROIDITIS: ICD-10-CM

## 2022-04-21 PROCEDURE — 99214 OFFICE O/P EST MOD 30 MIN: CPT | Performed by: FAMILY MEDICINE

## 2022-04-21 ASSESSMENT — FIBROSIS 4 INDEX: FIB4 SCORE: 1.26

## 2022-04-21 NOTE — PROGRESS NOTES
Chief Complaint   Patient presents with   • Hospital Follow-up       Subjective:     HPI:   Fatoumata Redding presents today with the followin. History of transient ischemic attack (TIA)  Patient was seen in hospital  with symptoms consistent with TIA.  She had facial droop that did resolve after several hours.  She has followed up with the [facility].  The numbness did take several days to resolve.  Patient is still quite fatigued.  Echocardiogram has been scheduled.  She has an appointment with the sleep medicine specialist on Alejandra 15.  Reviewed the reasoning for the rosuvastatin, aspirin and losartan.    2. Carotid atherosclerosis, bilateral/Bilateral carotid artery stenosis  Patient does have bilateral mild to moderate carotid atherosclerosis/artery stenosis.  Patient is a never smoker.  I feel it is very important for her to stay on the baby aspirin and the statin.    3. Hashimoto's thyroiditis  Discussed her fatigue.  She does have sleep study evaluation coming up in a few months but her thyroiditis does appear to be active.  Her TSH is suppressed.  Adjustments to medication discussed.  2 days a week she will take a half tab of the 150 levothyroxine rather than a full tablet.  We will recheck labs in 3 months.  Orders placed.    4. At risk for breast cancer  Patient has already completed BRCA testing.  She was negative.  Her mother was BRCA positive.  Patient continues regular mammograms and self breast exam.        Patient Active Problem List    Diagnosis Date Noted   • Carotid atherosclerosis, bilateral 2022   • Bilateral carotid artery stenosis 2022   • Hashimoto's thyroiditis 2022   • Severe obesity (BMI 35.0-35.9 with comorbidity) (HCC) 2022   • TIA (transient ischemic attack) 2022   • Hypothyroid 2019   • History of gastric surgery 2019   • Vitamin D deficiency 2019   • Family history of breast cancer in sister 2019   •  "Esophageal reflux 08/17/2011   • Essential hypertension 08/17/2011       Current medicines (including changes today)  Current Outpatient Medications   Medication Sig Dispense Refill   • losartan (COZAAR) 50 MG Tab Take 1 Tablet by mouth every day. 90 Tablet 1   • aspirin 81 MG EC tablet Take 1 Tablet by mouth every day for 30 days. 30 Tablet 0   • rosuvastatin (CRESTOR) 10 MG Tab Take 1 Tablet by mouth every day. 90 Tablet 3   • levothyroxine (SYNTHROID) 150 MCG Tab Take 1 Tablet by mouth every morning on an empty stomach. 90 Tablet 3     No current facility-administered medications for this visit.       Allergies   Allergen Reactions   • Prednisone Hives and Rash     \"hives and rash\"   • Promethazine Rash     Possible rash, not confirmed   • Tape Rash     Paper tape,other tapes give her rash   • Latex Rash     Latex gloves only-rash         ROS: As per HPI.  Goes to the gym 3 times per week       Objective:     /78 (BP Location: Right arm, Patient Position: Sitting, BP Cuff Size: Large adult)   Pulse 67   Temp 36.5 °C (97.7 °F) (Temporal)   Resp 16   Ht 1.6 m (5' 3\")   Wt 103 kg (226 lb)   SpO2 98%  Body mass index is 40.03 kg/m².    Physical Exam:  Constitutional: Well-developed and well-nourished. Not diaphoretic. No distress. Lucid and fluent.  Patient, physician and staff all wearing masks.  Skin: Skin is warm and dry. No rash noted.  Head: Atraumatic without lesions.  Eyes: Conjunctivae and extraocular motions are normal. Pupils are equal, round, and reactive to light. No scleral icterus.   Ears:  External ears unremarkable.  Neck: Supple, trachea midline. No thyromegaly present. No cervical or supraclavicular lymphadenopathy. No JVD or carotid bruits appreciated  Cardiovascular: Regular rate and rhythm.  Normal S1, S2 without murmur appreciated.  Chest: Effort normal. Clear to auscultation throughout. No adventitious sounds.   Abdomen: Soft, non tender, and without distention. Active bowel sounds " in all four quadrants. No rebound, guarding, masses or hepatosplenomegaly.  Extremities: No cyanosis, clubbing, erythema, nor edema.   Neurological: Alert and oriented x 3. No tremor appreciated  Psychiatric:  Behavior, mood, and affect are appropriate.       Assessment and Plan:     62 y.o. female with the following issues:    1. History of transient ischemic attack (TIA)     2. Carotid atherosclerosis, bilateral     3. Bilateral carotid artery stenosis     4. Hashimoto's thyroiditis  TSH WITH REFLEX TO FT4   5. At risk for breast cancer           Followup: Return in about 6 months (around 10/21/2022), or if symptoms worsen or fail to improve.

## 2022-04-26 ENCOUNTER — HOSPITAL ENCOUNTER (OUTPATIENT)
Dept: CARDIOLOGY | Facility: MEDICAL CENTER | Age: 62
End: 2022-04-26
Attending: FAMILY MEDICINE
Payer: COMMERCIAL

## 2022-04-26 DIAGNOSIS — I10 ESSENTIAL HYPERTENSION: ICD-10-CM

## 2022-04-26 DIAGNOSIS — G45.9 TIA (TRANSIENT ISCHEMIC ATTACK): ICD-10-CM

## 2022-04-26 LAB
LV EJECT FRACT  99904: 60
LV EJECT FRACT MOD 2C 99903: 64.67
LV EJECT FRACT MOD 4C 99902: 56.9
LV EJECT FRACT MOD BP 99901: 60.33

## 2022-04-26 PROCEDURE — 93306 TTE W/DOPPLER COMPLETE: CPT | Mod: 26 | Performed by: INTERNAL MEDICINE

## 2022-04-26 PROCEDURE — 93306 TTE W/DOPPLER COMPLETE: CPT

## 2022-06-30 DIAGNOSIS — I10 ESSENTIAL HYPERTENSION: ICD-10-CM

## 2022-06-30 RX ORDER — LOSARTAN POTASSIUM 50 MG/1
50 TABLET ORAL DAILY
Qty: 90 TABLET | Refills: 1 | Status: SHIPPED | OUTPATIENT
Start: 2022-06-30 | End: 2022-07-13 | Stop reason: SDUPTHER

## 2022-07-11 ENCOUNTER — HOSPITAL ENCOUNTER (OUTPATIENT)
Dept: LAB | Facility: MEDICAL CENTER | Age: 62
End: 2022-07-11
Attending: FAMILY MEDICINE
Payer: COMMERCIAL

## 2022-07-11 ENCOUNTER — HOSPITAL ENCOUNTER (OUTPATIENT)
Dept: LAB | Facility: MEDICAL CENTER | Age: 62
End: 2022-07-11
Attending: INTERNAL MEDICINE
Payer: COMMERCIAL

## 2022-07-11 DIAGNOSIS — E06.3 HASHIMOTO'S THYROIDITIS: ICD-10-CM

## 2022-07-11 LAB
IRON SATN MFR SERPL: 33 % (ref 15–55)
IRON SERPL-MCNC: 111 UG/DL (ref 40–170)
TIBC SERPL-MCNC: 332 UG/DL (ref 250–450)
UIBC SERPL-MCNC: 221 UG/DL (ref 110–370)

## 2022-07-11 PROCEDURE — 84446 ASSAY OF VITAMIN E: CPT

## 2022-07-11 PROCEDURE — 83540 ASSAY OF IRON: CPT

## 2022-07-11 PROCEDURE — 84630 ASSAY OF ZINC: CPT

## 2022-07-11 PROCEDURE — 84591 ASSAY OF NOS VITAMIN: CPT

## 2022-07-11 PROCEDURE — 84590 ASSAY OF VITAMIN A: CPT

## 2022-07-11 PROCEDURE — 82607 VITAMIN B-12: CPT

## 2022-07-11 PROCEDURE — 83550 IRON BINDING TEST: CPT

## 2022-07-11 PROCEDURE — 84207 ASSAY OF VITAMIN B-6: CPT

## 2022-07-11 PROCEDURE — 82306 VITAMIN D 25 HYDROXY: CPT

## 2022-07-11 PROCEDURE — 82180 ASSAY OF ASCORBIC ACID: CPT

## 2022-07-11 PROCEDURE — 84425 ASSAY OF VITAMIN B-1: CPT

## 2022-07-11 PROCEDURE — 82746 ASSAY OF FOLIC ACID SERUM: CPT

## 2022-07-11 PROCEDURE — 84134 ASSAY OF PREALBUMIN: CPT

## 2022-07-11 PROCEDURE — 36415 COLL VENOUS BLD VENIPUNCTURE: CPT

## 2022-07-11 PROCEDURE — 84443 ASSAY THYROID STIM HORMONE: CPT

## 2022-07-11 PROCEDURE — 84252 ASSAY OF VITAMIN B-2: CPT

## 2022-07-11 PROCEDURE — 82728 ASSAY OF FERRITIN: CPT

## 2022-07-12 LAB
25(OH)D3 SERPL-MCNC: 21 NG/ML (ref 30–100)
FERRITIN SERPL-MCNC: 52.2 NG/ML (ref 10–291)
FOLATE SERPL-MCNC: 19.8 NG/ML
PREALB SERPL-MCNC: 23.7 MG/DL (ref 18–38)
TSH SERPL DL<=0.005 MIU/L-ACNC: 1.46 UIU/ML (ref 0.38–5.33)
VIT B12 SERPL-MCNC: 670 PG/ML (ref 211–911)

## 2022-07-13 ENCOUNTER — OFFICE VISIT (OUTPATIENT)
Dept: MEDICAL GROUP | Facility: MEDICAL CENTER | Age: 62
End: 2022-07-13
Payer: COMMERCIAL

## 2022-07-13 VITALS
WEIGHT: 227.07 LBS | TEMPERATURE: 98.7 F | HEART RATE: 66 BPM | BODY MASS INDEX: 40.23 KG/M2 | SYSTOLIC BLOOD PRESSURE: 132 MMHG | DIASTOLIC BLOOD PRESSURE: 78 MMHG | HEIGHT: 63 IN | OXYGEN SATURATION: 97 %

## 2022-07-13 DIAGNOSIS — Z12.39 SCREENING BREAST EXAMINATION: ICD-10-CM

## 2022-07-13 DIAGNOSIS — Z13.71 BRCA GENE MUTATION NEGATIVE: ICD-10-CM

## 2022-07-13 DIAGNOSIS — I65.23 CAROTID ATHEROSCLEROSIS, BILATERAL: ICD-10-CM

## 2022-07-13 DIAGNOSIS — E55.9 VITAMIN D DEFICIENCY: ICD-10-CM

## 2022-07-13 DIAGNOSIS — M72.2 PLANTAR FASCIITIS, LEFT: ICD-10-CM

## 2022-07-13 DIAGNOSIS — M15.1 HEBERDEN'S NODES OF RIGHT HAND: ICD-10-CM

## 2022-07-13 DIAGNOSIS — Z86.73 HISTORY OF TRANSIENT ISCHEMIC ATTACK (TIA): ICD-10-CM

## 2022-07-13 DIAGNOSIS — E03.8 HYPOTHYROIDISM DUE TO HASHIMOTO'S THYROIDITIS: ICD-10-CM

## 2022-07-13 DIAGNOSIS — E06.3 HYPOTHYROIDISM DUE TO HASHIMOTO'S THYROIDITIS: ICD-10-CM

## 2022-07-13 DIAGNOSIS — M17.11 PRIMARY OSTEOARTHRITIS OF RIGHT KNEE: ICD-10-CM

## 2022-07-13 DIAGNOSIS — I10 ESSENTIAL HYPERTENSION: ICD-10-CM

## 2022-07-13 PROBLEM — G45.9 TIA (TRANSIENT ISCHEMIC ATTACK): Status: RESOLVED | Noted: 2022-03-24 | Resolved: 2022-07-13

## 2022-07-13 LAB
A-TOCOPHEROL VIT E SERPL-MCNC: 9.9 MG/L (ref 5.5–18)
BETA+GAMMA TOCOPHEROL SERPL-MCNC: 0.9 MG/L (ref 0–6)
VIT B6 SERPL-MCNC: 69.9 NMOL/L (ref 20–125)

## 2022-07-13 PROCEDURE — 99214 OFFICE O/P EST MOD 30 MIN: CPT | Performed by: FAMILY MEDICINE

## 2022-07-13 RX ORDER — LEVOTHYROXINE SODIUM 175 UG/1
175 TABLET ORAL
Qty: 90 TABLET | Refills: 3 | Status: SHIPPED | OUTPATIENT
Start: 2022-07-13 | End: 2023-02-23 | Stop reason: SDUPTHER

## 2022-07-13 RX ORDER — LEVOTHYROXINE SODIUM 175 UG/1
175 TABLET ORAL DAILY
COMMUNITY
Start: 2022-04-26 | End: 2022-07-13

## 2022-07-13 RX ORDER — LOSARTAN POTASSIUM 50 MG/1
50 TABLET ORAL DAILY
Qty: 90 TABLET | Refills: 1 | Status: SHIPPED | OUTPATIENT
Start: 2022-07-13 | End: 2023-03-29 | Stop reason: SDUPTHER

## 2022-07-13 ASSESSMENT — FIBROSIS 4 INDEX: FIB4 SCORE: 1.26

## 2022-07-13 NOTE — PROGRESS NOTES
Chief Complaint   Patient presents with   • Knee Pain     R knee pain. Pt states she cannot sit on R leg w/out burning sensation in knee.   • Foot Pain     Pt states she is experiencing plantar fasciatus sx in L heel. CBD cream helps at night, but pt states it works as a temporary band aid.    • Bump     R hand little finger bump. Pt states she believes it is growing.    • Lab Results     Iron levels from 22       Subjective:     HPI:   Fatoumata Redding presents today with the followin. Essential hypertension  HTN - Chronic condition stable. Currently taking all meds as directed.   She is not taking baby aspirin daily.  I have asked her to resume 81 mg daily due to her history of TIA.  She is not monitoring BP at home.   Denies symptoms low BP: light-headed, tunnel-vision, unusual fatigue.   Denies symptoms high BP:pounding headache, visual changes, palpitations, flushed face.   Denies medicine side effects: unusual fatigue, slow heartbeat, foot/leg swelling, cough.    2. History of transient ischemic attack (TIA)/Carotid atherosclerosis, bilateral  Patient does have history of TIA.  This is presumed to be due to her carotid atherosclerosis.  She is supposed to be taking 81 mg aspirin daily.  Discussed that it is important that she continue this and continue good blood pressure control.  Currently stable problem.     3. Hypothyroidism due to Hashimoto's thyroiditis  Patient reports good energy level on the medication. Patient denies insomnia, tremor or change in appetite.  Patient is taking the medication on an empty stomach in the morning and waiting at least 30 minutes before eating.  Last TSH in March this year was below target.  She has been instructed to take the levothyroxine 6 days weekly.  The levothyroxine is renewed.    4. Vitamin D deficiency  Patient had testing recently from her GI physician that showed low vitamin D3.  I have asked her to take 2000 units of vitamin D3 daily with her largest  meal.  Patient has no history of osteoporosis or osteoporotic fracture.      5. BRCA gene mutation negative  Patient has a strong  family history of cancer.  Her mother had ovarian cancer.  The patient was tested at that time for BRCA mutation and was negative.  Her sister had metastatic cancer and has survived it.  Eldest sister just got treated for melanoma of the face.    6. Primary osteoarthritis of right knee  Patient is having some discomfort in the right knee on the medial region and under the kneecap when she talks the leg and knee under her.  Discussed that I do not recommend sitting in that fashion.  On examination there is crepitus and it is quite likely that patient has osteoarthritis of the knee.  She denies give way of the knee.  There is no swelling or erythema to indicate any infection.  Stretching and good range of motion as well as knee squats are recommended.  She will let me know if that does not help and then if physical therapy referral will be placed.    7. Plantar fasciitis, left  Patient is having the classic symptoms on the bottom of the foot and the heel area most prominent when she tries to get up and walk after lying or sitting.  Discussed proper stretches.  Discussed also rolling the foot over a candle or roller as part of treatment.  Discussed the importance of having shoes that are relatively flat and well cushioned.     8. Heberden's nodes of right hand  Patient notes a prominent Heberden's node on the fifth digit of her right hand.  This is somewhat tender.  Discussed with patient that this is actually not a cyst and should not be popped or opened.  Discussed the natural progression of osteoarthritis.    9. Screening breast examination  Mammogram order discussed and placed.        Patient Active Problem List    Diagnosis Date Noted   • History of transient ischemic attack (TIA) 07/13/2022   • BRCA gene mutation negative 07/13/2022   • Primary osteoarthritis of right knee 07/13/2022  "  • Plantar fasciitis, left 07/13/2022   • Heberden's nodes of right hand 07/13/2022   • Carotid atherosclerosis, bilateral 04/21/2022   • Bilateral carotid artery stenosis 04/21/2022   • Hashimoto's thyroiditis 04/21/2022   • Severe obesity (BMI 35.0-35.9 with comorbidity) (HCC) 03/29/2022   • Hypothyroid 09/30/2019   • History of gastric surgery 09/30/2019   • Vitamin D deficiency 09/30/2019   • Family history of breast cancer in sister 09/30/2019   • Esophageal reflux 08/17/2011   • Essential hypertension 08/17/2011       Current medicines (including changes today)  Current Outpatient Medications   Medication Sig Dispense Refill   • losartan (COZAAR) 50 MG Tab Take 1 Tablet by mouth every day. 90 Tablet 1   • levothyroxine (SYNTHROID) 175 MCG Tab Take 1 Tablet by mouth every morning on an empty stomach. 90 Tablet 3   • rosuvastatin (CRESTOR) 10 MG Tab Take 1 Tablet by mouth every day. 90 Tablet 3     No current facility-administered medications for this visit.       Allergies   Allergen Reactions   • Prednisone Hives and Rash     \"hives and rash\"   • Promethazine Rash     Possible rash, not confirmed   • Tape Rash     Paper tape,other tapes give her rash   • Latex Rash     Latex gloves only-rash         ROS: As per HPI       Objective:     /78 (BP Location: Right arm, Patient Position: Sitting, BP Cuff Size: Adult long)   Pulse 66   Temp 37.1 °C (98.7 °F) (Temporal)   Ht 1.6 m (5' 3\")   Wt 103 kg (227 lb 1.2 oz)   SpO2 97%  Body mass index is 40.22 kg/m².    Physical Exam:  Constitutional: Well-developed and well-nourished. Not diaphoretic. No distress. Lucid and fluent.  Patient, physician and staff all wearing masks.  Skin: Skin is warm and dry. No rash noted.  Head: Atraumatic without lesions.  Eyes: Conjunctivae and extraocular motions are normal. Pupils are equal, round, and reactive to light. No scleral icterus.   Ears:  External ears unremarkable.   Neck: Supple, trachea midline. No thyromegaly " present. No cervical or supraclavicular lymphadenopathy. No JVD or carotid bruits appreciated  Cardiovascular: Regular rate and rhythm.  Normal S1, S2 without murmur appreciated.  Chest: Effort normal. Clear to auscultation throughout. No adventitious sounds.   Abdomen: Soft, non tender, and without distention. Active bowel sounds in all four quadrants. No rebound, guarding, masses or hepatosplenomegaly.  Extremities: No cyanosis, clubbing, erythema, nor edema.  Left plantar fascial tenderness.  Right knee crepitus, no erythema or heat.  Right hand 5th digit heberdens node.  Neurological: Alert and oriented x 3.   Psychiatric:  Behavior, mood, and affect are appropriate.    Lab results are reviewed and discussed.   Iron and ferritin normal.       Assessment and Plan:     62 y.o. female with the following issues:    1. Essential hypertension  losartan (COZAAR) 50 MG Tab   2. History of transient ischemic attack (TIA)     3. Carotid atherosclerosis, bilateral     4. Hypothyroidism due to Hashimoto's thyroiditis  levothyroxine (SYNTHROID) 175 MCG Tab   5. Vitamin D deficiency     6. BRCA gene mutation negative     7. Primary osteoarthritis of right knee  DX-KNEE COMPLETE 4+ RIGHT   8. Plantar fasciitis, left     9. Heberden's nodes of right hand     10. Screening breast examination  MA-SCREENING MAMMO BILAT W/TOMOSYNTHESIS W/CAD         Followup: Return in about 6 months (around 1/13/2023), or if symptoms worsen or fail to improve.

## 2022-07-14 ENCOUNTER — HOSPITAL ENCOUNTER (OUTPATIENT)
Dept: RADIOLOGY | Facility: MEDICAL CENTER | Age: 62
End: 2022-07-14
Attending: FAMILY MEDICINE
Payer: COMMERCIAL

## 2022-07-14 DIAGNOSIS — M17.11 PRIMARY OSTEOARTHRITIS OF RIGHT KNEE: ICD-10-CM

## 2022-07-14 LAB
ANNOTATION COMMENT IMP: NORMAL
RETINYL PALMITATE SERPL-MCNC: <0.02 MG/L (ref 0–0.1)
VIT A SERPL-MCNC: 0.52 MG/L (ref 0.3–1.2)
VIT B2 SERPL-SCNC: 7 NMOL/L (ref 5–50)
VIT C SERPL-MCNC: 80 UMOL/L (ref 23–114)
ZINC SERPL-MCNC: 92.7 UG/DL (ref 60–120)

## 2022-07-14 PROCEDURE — 73564 X-RAY EXAM KNEE 4 OR MORE: CPT | Mod: RT

## 2022-07-15 LAB — VIT B1 BLD-MCNC: 153 NMOL/L (ref 70–180)

## 2022-07-19 DIAGNOSIS — M25.561 RIGHT MEDIAL KNEE PAIN: ICD-10-CM

## 2022-07-20 NOTE — PROGRESS NOTES
Patient has persisting right medial knee pain.  X-ray is normal.  Patient has persistent symptoms and would like to proceed with further evaluation.  MRI order is placed.

## 2022-07-21 DIAGNOSIS — G89.29 CHRONIC PAIN OF RIGHT KNEE: ICD-10-CM

## 2022-07-21 DIAGNOSIS — M25.561 CHRONIC PAIN OF RIGHT KNEE: ICD-10-CM

## 2022-07-21 LAB — NIACIN SERPL-MCNC: 1.99 UG/ML (ref 0.5–8.45)

## 2022-07-21 NOTE — PROGRESS NOTES
Physical therapy referral to [facility] placed.  Patient has persistent right knee pain.

## 2022-08-02 ENCOUNTER — PHYSICAL THERAPY (OUTPATIENT)
Dept: PHYSICAL THERAPY | Facility: REHABILITATION | Age: 62
End: 2022-08-02
Attending: FAMILY MEDICINE
Payer: COMMERCIAL

## 2022-08-02 DIAGNOSIS — G89.29 CHRONIC PAIN OF RIGHT KNEE: ICD-10-CM

## 2022-08-02 DIAGNOSIS — M25.561 CHRONIC PAIN OF RIGHT KNEE: ICD-10-CM

## 2022-08-02 PROCEDURE — 97110 THERAPEUTIC EXERCISES: CPT

## 2022-08-02 PROCEDURE — 97162 PT EVAL MOD COMPLEX 30 MIN: CPT

## 2022-08-02 ASSESSMENT — ENCOUNTER SYMPTOMS
QUALITY: KNIFE-LIKE
PAIN SCALE AT HIGHEST: 10
PAIN SCALE: 0
QUALITY: SHARP
PAIN SCALE AT LOWEST: 0

## 2022-08-02 NOTE — OP THERAPY EVALUATION
Outpatient Physical Therapy  INITIAL EVALUATION    [facility] [address] [facility], [address]  [place] [address]  Phone:  [phone]    Date of Evaluation: 2022    Patient: Fatoumata Redding  YOB: 1960  MRN: [id number]     Referring Provider: Messi Patel M.D.  [address] [facility] [address]  [place] [address]   Referring Diagnosis Chronic pain of right knee [M25.561, G89.29]     Time Calculation  Start time: 1021  Stop time: 1100 Time Calculation (min): 39 minutes         Chief Complaint: No chief complaint on file.    Visit Diagnoses     ICD-10-CM   1. Chronic pain of right knee  M25.561    G89.29       Date of onset of impairment: 2022    Subjective:   History of Present Illness:     Mechanism of injury:  Onset of Right posterior knee pain x 1 month.  Cannot bend and rotate leg to cross leg over other leg. Symptoms are getting worse with time.    Pain is sharp , - catching,locking -instability  AGGS:  After walking , squatting , after sitting, twisting or moving leg while sleeping   Relief :  Stretching into extension  x-ray is negative  PMH:  No previous injuries, no surgeries   Walking 2 x day 1/2 mile   Sleep disturbance:  Not disrupted  Pain:     Current pain ratin    At best pain ratin    At worst pain rating:  10    Location:  Right posterior/medial knee    Quality:  Sharp and knife-like  Diagnostic Tests:     X-ray: normal    Treatments:     None    Patient Goals:     Patient goals for therapy:  Decreased pain, return to sport/leisure activities, increased motion and increased strength      Past Medical History:   Diagnosis Date   • Bilateral carotid artery stenosis 2022   • BRCA gene mutation negative 2022   • Carotid atherosclerosis, bilateral 2022   • Hashimoto's thyroiditis 2022   • Hyperlipidemia     in her 20s   • Hypertension     one meds at one point prior to weight loss surgery   • palpitations     resolved with weight  loss surgery   • Thyroid disease      Past Surgical History:   Procedure Laterality Date   • ABDOMINAL EXPLORATION  2013    gastric bypass   • ABDOMINAL EXPLORATION  2011    lap band   • CHOLECYSTECTOMY     • PRIMARY C SECTION      c/s x 2     Social History     Tobacco Use   • Smoking status: Never Smoker   • Smokeless tobacco: Never Used   Substance Use Topics   • Alcohol use: Yes     Comment: 1 glass wine/week     Family and Occupational History     Socioeconomic History   • Marital status:      Spouse name: Not on file   • Number of children: Not on file   • Years of education: Not on file   • Highest education level: Associate degree: occupational, technical, or vocational program   Occupational History   • Occupation: homemaker       Objective     Tenderness     Right Knee   Tenderness in the medial joint line.     Additional Tenderness Details  Posterior/medial right knee joint line tenderness    Active Range of Motion   Left Knee   Flexion: 125 degrees   Extension: 0 degrees     Right Knee   Flexion: 130 degrees with pain  Extension: 5 degrees     Additional Active Range of Motion Details  Lacking EROM extension right knee  Decrease//no better with repeated right knee extension in lying and sitting    Patellar Mobility     Additional Patellar Mobility Details  WFL bilateral    Strength:      Left Knee   Normal strength  Quadriceps contraction: good    Right Knee   Normal strength  Quadriceps contraction: good    Tests     Right Knee   Positive medial Naun.     Functional Assessment   Squat   Pain, trunk lean left and sitting toward left side.         Therapeutic Exercises (CPT 15180):     1. Quad set with folded towel under knee      Therapeutic Exercise Summary: Access Code: [email or web address]  URL: [email or web address]  Date: 08/02/2022  Prepared by: Ivy Douglass    Exercises  Supine Quad Set - 3 x daily - 7 x weekly - 1 sets - 10 reps  Supine Straight Leg Raises - 1 x daily - 7 x weekly - 3  sets - 10 reps  Supine Hip and Knee Flexion AROM with Swiss Ball - 1 x daily - 7 x weekly - 3 sets - 10 reps  Seated Quad Set - 3 x daily - 7 x weekly - 1 sets - 10 reps  Seated Active Assistive Knee Extension and Flexion Foot on Floor - 1 x daily - 7 x weekly - 3 sets - 10 reps  Seated Quad Set - 1 x daily - 7 x weekly - 3 sets - 10 reps        Time-based treatments/modalities:    Physical Therapy Timed Treatment Charges  Therapeutic exercise minutes (CPT 57831): 10 minutes      Assessment, Response and Plan:   Impairments: abnormal or restricted ROM, activity intolerance, difficulty performing job, hypersensitivity, lacks appropriate home exercise program, limited mobility and pain with function    Assessment details:  Patient presents with symptoms consistent with right knee derangement syndrome which is limiting her ability to squat, cross right foot over left knee in sitting or sitting on right knee with leg behind her.  Symptoms are also aggravated when transitioning from an extended position to a flexed position.  Patient demonstrates min restricted knee extension and  moderate limited flexion when pain occurs and a + medial Tate and has tenderness with palpation along the posterior medial aspect of the right tibiofemoral joint.  Patient does find temporary improvement with repeated knee extension in lying or sitting.  Recommend continued PT to meet goals stated below.   Barriers to therapy:  None  Prognosis: good    Goals:   Short Term Goals:   Patient demonstrates >50% decreased symptoms with right knee AROM  Patient is able to turn in bed ,  walk and perform ADLS with >50% decreased symptoms  Short term goal time span:  6-8 weeks      Long Term Goals:    Patient demonstrates full AROM symptom free  Patient able to squat with >50% decreased symptoms    Plan:   Therapy options:  Physical therapy treatment to continue  Planned therapy interventions:  Neuromuscular Re-education (CPT 78717), E Stim  Unattended (CPT 37808), Gait Training (CPT 84736), Therapeutic Exercise (CPT 73800) and Manual Therapy (CPT 65634)  Frequency:  2x week  Duration in weeks:  8  Discussed with:  Patient      Functional Assessment Used        Referring provider co-signature:  I have reviewed this plan of care and my co-signature certifies the need for services.    Certification Period: 08/02/2022 to  09/27/22    Physician Signature: ________________________________ Date: ______________

## 2022-08-04 ENCOUNTER — PHYSICAL THERAPY (OUTPATIENT)
Dept: PHYSICAL THERAPY | Facility: REHABILITATION | Age: 62
End: 2022-08-04
Attending: FAMILY MEDICINE
Payer: COMMERCIAL

## 2022-08-04 DIAGNOSIS — M25.561 CHRONIC PAIN OF RIGHT KNEE: ICD-10-CM

## 2022-08-04 DIAGNOSIS — G89.29 CHRONIC PAIN OF RIGHT KNEE: ICD-10-CM

## 2022-08-04 PROCEDURE — 97140 MANUAL THERAPY 1/> REGIONS: CPT

## 2022-08-04 PROCEDURE — 97110 THERAPEUTIC EXERCISES: CPT

## 2022-08-04 NOTE — OP THERAPY DAILY TREATMENT
Outpatient Physical Therapy  DAILY TREATMENT     [facility] [address], [address]  [place] [address]  Phone:  [phone]    Date: 08/04/2022    Patient: Fatoumata Redding  YOB: 1960  MRN: [id number]     Time Calculation    Start time: 1016  Stop time: 1100 Time Calculation (min): 44 minutes         Chief Complaint: No chief complaint on file.    Visit #: 2    SUBJECTIVE:  On feet all day increased aggravation right knee. Concerned about going camping this weekend.     OBJECTIVE:  Current objective measures: hypersensitivity and pain with palpation medial/posterior tibiofemoral joint right           Therapeutic Exercises (CPT 77085):     1. Quad set with folded towel under knee    2. Nu step L4.5, 10 min    3. Repeated knee extension in sittingg with overpressure     4. Repeated knee flexion on ball painfree range      Therapeutic Exercise Summary: Access Code: [email or web address]  URL: [email or web address]  Date: 08/02/2022  Prepared by: Ivy Douglass    Exercises  Supine Quad Set - 3 x daily - 7 x weekly - 1 sets - 10 reps  Supine Straight Leg Raises - 1 x daily - 7 x weekly - 3 sets - 10 reps  Supine Hip and Knee Flexion AROM with Swiss Ball - 1 x daily - 7 x weekly - 3 sets - 10 reps  Seated Quad Set - 3 x daily - 7 x weekly - 1 sets - 10 reps  Seated Active Assistive Knee Extension and Flexion Foot on Floor - 1 x daily - 7 x weekly - 3 sets - 10 reps  Seated Quad Set - 1 x daily - 7 x weekly - 3 sets - 10 reps      Therapeutic Treatments and Modalities:     1. Manual Therapy (CPT 83655), IASTM right pesanserine , MCL, medial hamstrings and dital VMO, prone traction right tibiofemoral joint and flexion mobilization    Time-based treatments/modalities:    Physical Therapy Timed Treatment Charges  Manual therapy minutes (CPT 02555): 15 minutes  Therapeutic exercise minutes (CPT 21540): 25 minutes    ASSESSMENT:   Response to treatment: No significant change post  treatment although patient did have temporary ease in pain and was able to bend knee farther post flexion mobilization and traction prone and IASTM .  Recommend referral to orthopedic specialist and MRI for further evaluation.     PLAN/RECOMMENDATIONS:   Plan for treatment: therapy treatment to continue next visit.  Planned interventions for next visit: continue with current treatment.

## 2022-08-09 ENCOUNTER — APPOINTMENT (OUTPATIENT)
Dept: PHYSICAL THERAPY | Facility: REHABILITATION | Age: 62
End: 2022-08-09
Attending: FAMILY MEDICINE
Payer: COMMERCIAL

## 2022-08-13 DIAGNOSIS — G89.29 CHRONIC PAIN OF RIGHT KNEE: ICD-10-CM

## 2022-08-13 DIAGNOSIS — M25.561 CHRONIC PAIN OF RIGHT KNEE: ICD-10-CM

## 2022-08-15 ENCOUNTER — PHYSICAL THERAPY (OUTPATIENT)
Dept: PHYSICAL THERAPY | Facility: REHABILITATION | Age: 62
End: 2022-08-15
Attending: FAMILY MEDICINE
Payer: COMMERCIAL

## 2022-08-15 DIAGNOSIS — M25.561 CHRONIC PAIN OF RIGHT KNEE: ICD-10-CM

## 2022-08-15 DIAGNOSIS — G89.29 CHRONIC PAIN OF RIGHT KNEE: ICD-10-CM

## 2022-08-15 PROCEDURE — 97140 MANUAL THERAPY 1/> REGIONS: CPT

## 2022-08-15 PROCEDURE — 97110 THERAPEUTIC EXERCISES: CPT

## 2022-08-15 NOTE — OP THERAPY DAILY TREATMENT
Outpatient Physical Therapy  DAILY TREATMENT     [facility] [address], [address]  [place] [address]  Phone:  [phone]    Date: 08/15/2022    Patient: Fatoumata Redding  YOB: 1960  MRN: [id number]     Time Calculation    Start time: 1100  Stop time: 1145 Time Calculation (min): 45 minutes         Chief Complaint: knee problem  Visit #: 3    SUBJECTIVE:  Very irritable right medial/posterior knee no change    OBJECTIVE:  Current objective measures: increase//worse right medial posterior knee pain with flexion > 30 degrees. Antalgic gait with decreaed weightbearing right           Therapeutic Exercises (CPT 50825):     1. quad set with folded towel under knee    2. nu step L4.5, 5 min, increase//worse    3. repeated knee extension in sittingg with overpressure     4. repeated knee flexion on ball painfree range, increase//worse    5. SLR abd and flexion, 3 x 10    6. bridge, 3 x 10    7. long arc quad with resistance green tband, 3 x 10      Therapeutic Exercise Summary:   Access Code: [email or web address]  URL: [email or web address]  Date: 08/15/2022  Prepared by: Ivy Douglass    Exercises  Small Range Straight Leg Raise - 1 x daily - 7 x weekly - 3 sets - 10 reps  Sidelying Hip Abduction - 1 x daily - 7 x weekly - 3 sets - 10 reps  Supine Bridge - 1 x daily - 7 x weekly - 3 sets - 10 reps  Supine Hip and Knee Flexion AROM with Swiss Ball - 1 x daily - 7 x weekly - 3 sets - 10 reps  Seated Long Arc Quad with Ankle Weight - 1 x daily - 7 x weekly - 3 sets - 10 reps  Access Code: [email or web address]  URL: [email or web address]  Date: 08/02/2022  Prepared by: Ivy Douglass    Exercises  Supine Quad Set - 3 x daily - 7 x weekly - 1 sets - 10 reps  Supine Straight Leg Raises - 1 x daily - 7 x weekly - 3 sets - 10 reps  Supine Hip and Knee Flexion AROM with Swiss Ball - 1 x daily - 7 x weekly - 3 sets - 10 reps  Seated Quad Set - 3 x daily - 7 x weekly - 1 sets - 10  reps  Seated Active Assistive Knee Extension and Flexion Foot on Floor - 1 x daily - 7 x weekly - 3 sets - 10 reps  Seated Quad Set - 1 x daily - 7 x weekly - 3 sets - 10 reps      Therapeutic Treatments and Modalities:     1. Manual Therapy (CPT 42428), IASTM right pesanserine , MCL, medial hamstrings and dital VMO, prone traction right tibiofemoral joint and flexion mobilizations  Time-based treatments/modalities:    Physical Therapy Timed Treatment Charges  Manual therapy minutes (CPT 22171): 8 minutes  Therapeutic exercise minutes (CPT 30299): 33 minutes    ASSESSMENT:   Response to treatment: no change in symptoms, right post/medial knee remains hypersensistive and painful to palpation and with AROM flexion and rotation.  Patient is scheduled with an orthopedic specialist and is having an MRI on Thursday.  Treatment focused on unloaded LE strength work including SLR, bridge and LAQ.  Continue with current POC  PLAN/RECOMMENDATIONS:   Plan for treatment: therapy treatment to continue next visit.  Planned interventions for next visit: continue with current treatment.

## 2022-08-18 ENCOUNTER — APPOINTMENT (OUTPATIENT)
Dept: RADIOLOGY | Facility: MEDICAL CENTER | Age: 62
End: 2022-08-18
Attending: FAMILY MEDICINE
Payer: COMMERCIAL

## 2022-08-18 DIAGNOSIS — M25.561 RIGHT MEDIAL KNEE PAIN: ICD-10-CM

## 2022-08-18 PROCEDURE — 73721 MRI JNT OF LWR EXTRE W/O DYE: CPT | Mod: RT

## 2022-08-30 ENCOUNTER — APPOINTMENT (OUTPATIENT)
Dept: PHYSICAL THERAPY | Facility: REHABILITATION | Age: 62
End: 2022-08-30
Attending: FAMILY MEDICINE
Payer: COMMERCIAL

## 2022-09-01 ENCOUNTER — HOSPITAL ENCOUNTER (OUTPATIENT)
Dept: RADIOLOGY | Facility: MEDICAL CENTER | Age: 62
End: 2022-09-01
Attending: FAMILY MEDICINE
Payer: COMMERCIAL

## 2022-09-01 DIAGNOSIS — Z12.39 SCREENING BREAST EXAMINATION: ICD-10-CM

## 2022-09-01 PROCEDURE — 77063 BREAST TOMOSYNTHESIS BI: CPT

## 2022-09-08 ENCOUNTER — APPOINTMENT (OUTPATIENT)
Dept: PHYSICAL THERAPY | Facility: REHABILITATION | Age: 62
End: 2022-09-08
Payer: COMMERCIAL

## 2022-09-13 ENCOUNTER — APPOINTMENT (OUTPATIENT)
Dept: PHYSICAL THERAPY | Facility: REHABILITATION | Age: 62
End: 2022-09-13
Attending: FAMILY MEDICINE
Payer: COMMERCIAL

## 2022-09-19 ENCOUNTER — APPOINTMENT (OUTPATIENT)
Dept: PHYSICAL THERAPY | Facility: REHABILITATION | Age: 62
End: 2022-09-19
Attending: FAMILY MEDICINE
Payer: COMMERCIAL

## 2022-09-22 ENCOUNTER — APPOINTMENT (OUTPATIENT)
Dept: PHYSICAL THERAPY | Facility: REHABILITATION | Age: 62
End: 2022-09-22
Attending: FAMILY MEDICINE
Payer: COMMERCIAL

## 2022-09-26 ENCOUNTER — APPOINTMENT (OUTPATIENT)
Dept: PHYSICAL THERAPY | Facility: REHABILITATION | Age: 62
End: 2022-09-26
Attending: FAMILY MEDICINE
Payer: COMMERCIAL

## 2022-09-29 ENCOUNTER — TELEPHONE (OUTPATIENT)
Dept: PHYSICAL THERAPY | Facility: REHABILITATION | Age: 62
End: 2022-09-29
Payer: COMMERCIAL

## 2022-09-29 NOTE — OP THERAPY DISCHARGE SUMMARY
Outpatient Physical Therapy  DISCHARGE SUMMARY NOTE      [facility] [address], [address]  [place] [address]  Phone:  [phone]    Date of Visit: 09/29/2022    Patient: Fatoumata Redding  YOB: 1960  MRN: [id number]     Referring Provider: Messi Patel M.D.   Referring Diagnosis Chronic pain of right knee         Functional Assessment Used        Your patient is being discharged from Physical Therapy with the following comments:   Patient referred to orthopedic specialist  secondary to worsening, unchanging symptoms.     Comments:  Please see daily notes for details     Limitations Remaining:      Recommendations:  Discharge from PT    Ivy Douglass PT, MSPT    Date: 9/29/2022

## 2022-12-02 ENCOUNTER — PREP FOR PROCEDURE (OUTPATIENT)
Dept: SURGERY | Facility: MEDICAL CENTER | Age: 62
End: 2022-12-02

## 2022-12-02 PROBLEM — S83.249A MEDIAL MENISCUS TEAR: Status: ACTIVE | Noted: 2022-12-02

## 2022-12-02 RX ORDER — CEFAZOLIN SODIUM 1 G/3ML
2 INJECTION, POWDER, FOR SOLUTION INTRAMUSCULAR; INTRAVENOUS ONCE
Status: CANCELLED | OUTPATIENT
Start: 2022-12-23 | End: 2022-12-23

## 2022-12-13 ENCOUNTER — PRE-ADMISSION TESTING (OUTPATIENT)
Dept: ADMISSIONS | Facility: MEDICAL CENTER | Age: 62
End: 2022-12-13
Attending: ORTHOPAEDIC SURGERY
Payer: COMMERCIAL

## 2022-12-13 DIAGNOSIS — Z01.810 PRE-OPERATIVE CARDIOVASCULAR EXAMINATION: ICD-10-CM

## 2022-12-13 DIAGNOSIS — Z01.812 PRE-OPERATIVE LABORATORY EXAMINATION: ICD-10-CM

## 2022-12-13 LAB
ANION GAP SERPL CALC-SCNC: 7 MMOL/L (ref 7–16)
BUN SERPL-MCNC: 9 MG/DL (ref 8–22)
CALCIUM SERPL-MCNC: 9.8 MG/DL (ref 8.5–10.5)
CHLORIDE SERPL-SCNC: 104 MMOL/L (ref 96–112)
CO2 SERPL-SCNC: 28 MMOL/L (ref 20–33)
CREAT SERPL-MCNC: 0.54 MG/DL (ref 0.5–1.4)
EKG IMPRESSION: NORMAL
GFR SERPLBLD CREATININE-BSD FMLA CKD-EPI: 105 ML/MIN/1.73 M 2
GLUCOSE SERPL-MCNC: 80 MG/DL (ref 65–99)
POTASSIUM SERPL-SCNC: 4.6 MMOL/L (ref 3.6–5.5)
SODIUM SERPL-SCNC: 139 MMOL/L (ref 135–145)

## 2022-12-13 PROCEDURE — 93005 ELECTROCARDIOGRAM TRACING: CPT

## 2022-12-13 PROCEDURE — 93010 ELECTROCARDIOGRAM REPORT: CPT | Performed by: INTERNAL MEDICINE

## 2022-12-13 PROCEDURE — 80048 BASIC METABOLIC PNL TOTAL CA: CPT

## 2022-12-13 PROCEDURE — 36415 COLL VENOUS BLD VENIPUNCTURE: CPT

## 2022-12-13 ASSESSMENT — FIBROSIS 4 INDEX: FIB4 SCORE: 1.26

## 2022-12-23 ENCOUNTER — ANESTHESIA EVENT (OUTPATIENT)
Dept: SURGERY | Facility: MEDICAL CENTER | Age: 62
End: 2022-12-23
Payer: COMMERCIAL

## 2022-12-23 ENCOUNTER — HOSPITAL ENCOUNTER (OUTPATIENT)
Facility: MEDICAL CENTER | Age: 62
End: 2022-12-23
Attending: ORTHOPAEDIC SURGERY | Admitting: ORTHOPAEDIC SURGERY
Payer: COMMERCIAL

## 2022-12-23 ENCOUNTER — ANESTHESIA (OUTPATIENT)
Dept: SURGERY | Facility: MEDICAL CENTER | Age: 62
End: 2022-12-23
Payer: COMMERCIAL

## 2022-12-23 VITALS
OXYGEN SATURATION: 93 % | HEART RATE: 70 BPM | TEMPERATURE: 97.2 F | HEIGHT: 63 IN | SYSTOLIC BLOOD PRESSURE: 130 MMHG | WEIGHT: 231.48 LBS | BODY MASS INDEX: 41.02 KG/M2 | RESPIRATION RATE: 16 BRPM | DIASTOLIC BLOOD PRESSURE: 73 MMHG

## 2022-12-23 PROCEDURE — 160009 HCHG ANES TIME/MIN: Performed by: ORTHOPAEDIC SURGERY

## 2022-12-23 PROCEDURE — A9270 NON-COVERED ITEM OR SERVICE: HCPCS | Performed by: ANESTHESIOLOGY

## 2022-12-23 PROCEDURE — 700105 HCHG RX REV CODE 258: Performed by: ORTHOPAEDIC SURGERY

## 2022-12-23 PROCEDURE — 700111 HCHG RX REV CODE 636 W/ 250 OVERRIDE (IP): Performed by: ANESTHESIOLOGY

## 2022-12-23 PROCEDURE — 29881 ARTHRS KNE SRG MNISECTMY M/L: CPT | Mod: RT | Performed by: ORTHOPAEDIC SURGERY

## 2022-12-23 PROCEDURE — 160035 HCHG PACU - 1ST 60 MINS PHASE I: Performed by: ORTHOPAEDIC SURGERY

## 2022-12-23 PROCEDURE — 160025 RECOVERY II MINUTES (STATS): Performed by: ORTHOPAEDIC SURGERY

## 2022-12-23 PROCEDURE — 160046 HCHG PACU - 1ST 60 MINS PHASE II: Performed by: ORTHOPAEDIC SURGERY

## 2022-12-23 PROCEDURE — 700111 HCHG RX REV CODE 636 W/ 250 OVERRIDE (IP): Performed by: ORTHOPAEDIC SURGERY

## 2022-12-23 PROCEDURE — 160038 HCHG SURGERY MINUTES - EA ADDL 1 MIN LEVEL 2: Performed by: ORTHOPAEDIC SURGERY

## 2022-12-23 PROCEDURE — 160027 HCHG SURGERY MINUTES - 1ST 30 MINS LEVEL 2: Performed by: ORTHOPAEDIC SURGERY

## 2022-12-23 PROCEDURE — 700102 HCHG RX REV CODE 250 W/ 637 OVERRIDE(OP): Performed by: ANESTHESIOLOGY

## 2022-12-23 PROCEDURE — 160036 HCHG PACU - EA ADDL 30 MINS PHASE I: Performed by: ORTHOPAEDIC SURGERY

## 2022-12-23 PROCEDURE — 160002 HCHG RECOVERY MINUTES (STAT): Performed by: ORTHOPAEDIC SURGERY

## 2022-12-23 PROCEDURE — 700101 HCHG RX REV CODE 250: Performed by: ANESTHESIOLOGY

## 2022-12-23 PROCEDURE — 01400 ANES OPN/ARTHRS KNEE JT NOS: CPT | Performed by: ANESTHESIOLOGY

## 2022-12-23 PROCEDURE — 160048 HCHG OR STATISTICAL LEVEL 1-5: Performed by: ORTHOPAEDIC SURGERY

## 2022-12-23 RX ORDER — HALOPERIDOL 5 MG/ML
1 INJECTION INTRAMUSCULAR
Status: DISCONTINUED | OUTPATIENT
Start: 2022-12-23 | End: 2022-12-23 | Stop reason: HOSPADM

## 2022-12-23 RX ORDER — MEPERIDINE HYDROCHLORIDE 25 MG/ML
INJECTION INTRAMUSCULAR; INTRAVENOUS; SUBCUTANEOUS PRN
Status: DISCONTINUED | OUTPATIENT
Start: 2022-12-23 | End: 2022-12-23 | Stop reason: SURG

## 2022-12-23 RX ORDER — KETOROLAC TROMETHAMINE 30 MG/ML
INJECTION, SOLUTION INTRAMUSCULAR; INTRAVENOUS PRN
Status: DISCONTINUED | OUTPATIENT
Start: 2022-12-23 | End: 2022-12-23 | Stop reason: SURG

## 2022-12-23 RX ORDER — HYDROMORPHONE HYDROCHLORIDE 1 MG/ML
0.2 INJECTION, SOLUTION INTRAMUSCULAR; INTRAVENOUS; SUBCUTANEOUS
Status: DISCONTINUED | OUTPATIENT
Start: 2022-12-23 | End: 2022-12-23 | Stop reason: HOSPADM

## 2022-12-23 RX ORDER — HYDROMORPHONE HYDROCHLORIDE 1 MG/ML
0.1 INJECTION, SOLUTION INTRAMUSCULAR; INTRAVENOUS; SUBCUTANEOUS
Status: DISCONTINUED | OUTPATIENT
Start: 2022-12-23 | End: 2022-12-23 | Stop reason: HOSPADM

## 2022-12-23 RX ORDER — EPINEPHRINE 1 MG/ML(1)
AMPUL (ML) INJECTION
Status: DISCONTINUED | OUTPATIENT
Start: 2022-12-23 | End: 2022-12-23 | Stop reason: HOSPADM

## 2022-12-23 RX ORDER — GLYCOPYRROLATE 0.2 MG/ML
INJECTION INTRAMUSCULAR; INTRAVENOUS PRN
Status: DISCONTINUED | OUTPATIENT
Start: 2022-12-23 | End: 2022-12-23 | Stop reason: SURG

## 2022-12-23 RX ORDER — LABETALOL HYDROCHLORIDE 5 MG/ML
5 INJECTION, SOLUTION INTRAVENOUS
Status: DISCONTINUED | OUTPATIENT
Start: 2022-12-23 | End: 2022-12-23 | Stop reason: HOSPADM

## 2022-12-23 RX ORDER — ROCURONIUM BROMIDE 10 MG/ML
INJECTION, SOLUTION INTRAVENOUS PRN
Status: DISCONTINUED | OUTPATIENT
Start: 2022-12-23 | End: 2022-12-23 | Stop reason: SURG

## 2022-12-23 RX ORDER — SODIUM CHLORIDE, SODIUM LACTATE, POTASSIUM CHLORIDE, CALCIUM CHLORIDE 600; 310; 30; 20 MG/100ML; MG/100ML; MG/100ML; MG/100ML
INJECTION, SOLUTION INTRAVENOUS CONTINUOUS
Status: DISCONTINUED | OUTPATIENT
Start: 2022-12-23 | End: 2022-12-23 | Stop reason: HOSPADM

## 2022-12-23 RX ORDER — OXYCODONE HYDROCHLORIDE AND ACETAMINOPHEN 5; 325 MG/1; MG/1
2 TABLET ORAL
Status: COMPLETED | OUTPATIENT
Start: 2022-12-23 | End: 2022-12-23

## 2022-12-23 RX ORDER — ROPIVACAINE HYDROCHLORIDE 5 MG/ML
INJECTION, SOLUTION EPIDURAL; INFILTRATION; PERINEURAL
Status: DISCONTINUED | OUTPATIENT
Start: 2022-12-23 | End: 2022-12-23 | Stop reason: HOSPADM

## 2022-12-23 RX ORDER — ONDANSETRON 2 MG/ML
INJECTION INTRAMUSCULAR; INTRAVENOUS PRN
Status: DISCONTINUED | OUTPATIENT
Start: 2022-12-23 | End: 2022-12-23 | Stop reason: SURG

## 2022-12-23 RX ORDER — SUCCINYLCHOLINE CHLORIDE 20 MG/ML
INJECTION INTRAMUSCULAR; INTRAVENOUS PRN
Status: DISCONTINUED | OUTPATIENT
Start: 2022-12-23 | End: 2022-12-23 | Stop reason: SURG

## 2022-12-23 RX ORDER — HYDRALAZINE HYDROCHLORIDE 20 MG/ML
5 INJECTION INTRAMUSCULAR; INTRAVENOUS
Status: DISCONTINUED | OUTPATIENT
Start: 2022-12-23 | End: 2022-12-23 | Stop reason: HOSPADM

## 2022-12-23 RX ORDER — METOPROLOL TARTRATE 1 MG/ML
1 INJECTION, SOLUTION INTRAVENOUS
Status: DISCONTINUED | OUTPATIENT
Start: 2022-12-23 | End: 2022-12-23 | Stop reason: HOSPADM

## 2022-12-23 RX ORDER — MEPERIDINE HYDROCHLORIDE 25 MG/ML
6.25 INJECTION INTRAMUSCULAR; INTRAVENOUS; SUBCUTANEOUS
Status: DISCONTINUED | OUTPATIENT
Start: 2022-12-23 | End: 2022-12-23 | Stop reason: HOSPADM

## 2022-12-23 RX ORDER — OXYCODONE HYDROCHLORIDE AND ACETAMINOPHEN 5; 325 MG/1; MG/1
1 TABLET ORAL
Status: COMPLETED | OUTPATIENT
Start: 2022-12-23 | End: 2022-12-23

## 2022-12-23 RX ORDER — MIDAZOLAM HYDROCHLORIDE 1 MG/ML
INJECTION INTRAMUSCULAR; INTRAVENOUS PRN
Status: DISCONTINUED | OUTPATIENT
Start: 2022-12-23 | End: 2022-12-23 | Stop reason: SURG

## 2022-12-23 RX ORDER — HYDROMORPHONE HYDROCHLORIDE 1 MG/ML
0.4 INJECTION, SOLUTION INTRAMUSCULAR; INTRAVENOUS; SUBCUTANEOUS
Status: DISCONTINUED | OUTPATIENT
Start: 2022-12-23 | End: 2022-12-23 | Stop reason: HOSPADM

## 2022-12-23 RX ORDER — DIPHENHYDRAMINE HYDROCHLORIDE 50 MG/ML
12.5 INJECTION INTRAMUSCULAR; INTRAVENOUS
Status: DISCONTINUED | OUTPATIENT
Start: 2022-12-23 | End: 2022-12-23 | Stop reason: HOSPADM

## 2022-12-23 RX ORDER — SODIUM CHLORIDE, SODIUM LACTATE, POTASSIUM CHLORIDE, CALCIUM CHLORIDE 600; 310; 30; 20 MG/100ML; MG/100ML; MG/100ML; MG/100ML
INJECTION, SOLUTION INTRAVENOUS CONTINUOUS
Status: ACTIVE | OUTPATIENT
Start: 2022-12-23 | End: 2022-12-23

## 2022-12-23 RX ORDER — CEFAZOLIN SODIUM 1 G/3ML
2 INJECTION, POWDER, FOR SOLUTION INTRAMUSCULAR; INTRAVENOUS ONCE
Status: COMPLETED | OUTPATIENT
Start: 2022-12-23 | End: 2022-12-23

## 2022-12-23 RX ORDER — DEXAMETHASONE SODIUM PHOSPHATE 4 MG/ML
INJECTION, SOLUTION INTRA-ARTICULAR; INTRALESIONAL; INTRAMUSCULAR; INTRAVENOUS; SOFT TISSUE PRN
Status: DISCONTINUED | OUTPATIENT
Start: 2022-12-23 | End: 2022-12-23 | Stop reason: SURG

## 2022-12-23 RX ORDER — MORPHINE SULFATE 1 MG/ML
INJECTION, SOLUTION EPIDURAL; INTRATHECAL; INTRAVENOUS
Status: DISCONTINUED | OUTPATIENT
Start: 2022-12-23 | End: 2022-12-23 | Stop reason: HOSPADM

## 2022-12-23 RX ADMIN — HYDRALAZINE HYDROCHLORIDE 5 MG: 20 INJECTION INTRAMUSCULAR; INTRAVENOUS at 10:28

## 2022-12-23 RX ADMIN — FENTANYL CITRATE 50 MCG: 50 INJECTION INTRAMUSCULAR; INTRAVENOUS at 09:45

## 2022-12-23 RX ADMIN — SUCCINYLCHOLINE CHLORIDE 120 MG: 20 INJECTION, SOLUTION INTRAMUSCULAR; INTRAVENOUS; PARENTERAL at 08:08

## 2022-12-23 RX ADMIN — CEFAZOLIN 2 G: 330 INJECTION, POWDER, FOR SOLUTION INTRAMUSCULAR; INTRAVENOUS at 08:11

## 2022-12-23 RX ADMIN — PROPOFOL 150 MG: 10 INJECTION, EMULSION INTRAVENOUS at 08:08

## 2022-12-23 RX ADMIN — HYDRALAZINE HYDROCHLORIDE 5 MG: 20 INJECTION INTRAMUSCULAR; INTRAVENOUS at 10:38

## 2022-12-23 RX ADMIN — SODIUM CHLORIDE, POTASSIUM CHLORIDE, SODIUM LACTATE AND CALCIUM CHLORIDE: 600; 310; 30; 20 INJECTION, SOLUTION INTRAVENOUS at 08:02

## 2022-12-23 RX ADMIN — MEPERIDINE HYDROCHLORIDE 25 MG: 25 INJECTION INTRAMUSCULAR; INTRAVENOUS; SUBCUTANEOUS at 08:47

## 2022-12-23 RX ADMIN — GLYCOPYRROLATE 0.1 MG: 0.2 INJECTION INTRAMUSCULAR; INTRAVENOUS at 08:40

## 2022-12-23 RX ADMIN — MIDAZOLAM HYDROCHLORIDE 2 MG: 1 INJECTION, SOLUTION INTRAMUSCULAR; INTRAVENOUS at 08:00

## 2022-12-23 RX ADMIN — ROCURONIUM BROMIDE 5 MG: 10 INJECTION, SOLUTION INTRAVENOUS at 08:08

## 2022-12-23 RX ADMIN — DEXAMETHASONE SODIUM PHOSPHATE 8 MG: 4 INJECTION, SOLUTION INTRA-ARTICULAR; INTRALESIONAL; INTRAMUSCULAR; INTRAVENOUS; SOFT TISSUE at 08:05

## 2022-12-23 RX ADMIN — ONDANSETRON 4 MG: 2 INJECTION INTRAMUSCULAR; INTRAVENOUS at 08:54

## 2022-12-23 RX ADMIN — FENTANYL CITRATE 50 MCG: 50 INJECTION, SOLUTION INTRAMUSCULAR; INTRAVENOUS at 08:32

## 2022-12-23 RX ADMIN — KETOROLAC TROMETHAMINE 30 MG: 30 INJECTION, SOLUTION INTRAMUSCULAR at 08:54

## 2022-12-23 RX ADMIN — OXYCODONE AND ACETAMINOPHEN 2 TABLET: 5; 325 TABLET ORAL at 09:42

## 2022-12-23 RX ADMIN — FENTANYL CITRATE 50 MCG: 50 INJECTION INTRAMUSCULAR; INTRAVENOUS at 09:54

## 2022-12-23 RX ADMIN — FENTANYL CITRATE 100 MCG: 50 INJECTION, SOLUTION INTRAMUSCULAR; INTRAVENOUS at 08:08

## 2022-12-23 ASSESSMENT — FIBROSIS 4 INDEX: FIB4 SCORE: 1.26

## 2022-12-23 ASSESSMENT — PAIN SCALES - GENERAL: PAIN_LEVEL: 4

## 2022-12-23 NOTE — ANESTHESIA POSTPROCEDURE EVALUATION
Patient: Fatoumata Redding    Procedure Summary     Date: 12/23/22 Room / Location: [place] [address] / SURGERY [facility]    Anesthesia Start: 0730 Anesthesia Stop: 0917    Procedure: Right knee arthroscopy, partial medial menisectomy. (Right) Diagnosis:       Medial meniscus tear      (Medial meniscus tear [S83.249A])    Surgeons: Oscar Oliver M.D. Responsible Provider: Дмитрий Frazier D.O.    Anesthesia Type: general ASA Status: 3          Final Anesthesia Type: general  Last vitals  BP   Blood Pressure: (!) 148/75    Temp   36.2 °C (97.2 °F)    Pulse   87   Resp   15    SpO2   97 %      Anesthesia Post Evaluation    Patient location during evaluation: PACU  Patient participation: complete - patient participated  Level of consciousness: awake and alert  Pain score: 4    Airway patency: patent  Anesthetic complications: no  Cardiovascular status: hemodynamically stable  Respiratory status: acceptable  Hydration status: euvolemic    PONV: none          No notable events documented.     Nurse Pain Score: 5 (NPRS)

## 2022-12-23 NOTE — LETTER
December 7, 2022    Patient Name: Fatoumata Redding  Surgeon Name: Oscar Oliver M.D.  Surgery Facility: [facility] ([address] [facility])  Surgery Date: 12/23/2022 at 8:00 am, check in 6:00 am    If you will not be at one of the below numbers please call the surgery scheduler at [phone]  Preferred Phone: [phone]    BEFORE YOUR SURGERY   Pre Registration and/or Lab Work must be done within and no earlier than 28 days prior to your surgery date. Please call [facility] at [phone] for an appointment as soon as possible.    COVID testing is not required for non-symptomatic vaccinated patients with proof of vaccination at [facility].  For un-vaccinated patients please refer to the following instructions for your COVID testing requirements:    COVID test required 4-7 days prior to surgery, failure to do so can result in a cancellation.    The following locations offer COVID testing:    Approved facilities for COVID testing, if scheduled at [facility]:  · [facility] from 7:30am-3:30pm at [address] [place] [address]  · [facility] [phone] (Please call for an appointment)  · Your local pharmacy    Not scheduled at [facility] contact the scheduled facility for approved testing facilities.    Pre op Appointment:   Date: 12/16/2022   Time: 10:30 am   Provider: Oscar Oliver M.D.   Location: [address] [place] [address]  Instructions: Bring a list of all medications you are taking including the dosing and frequency.        DAY OF YOUR SURGERY  Nothing to eat or drink after midnight     Refrain from smoking any substance after midnight prior to surgery. Smoking may interfere with the anesthetic and frequently produces nausea during the recovery period.    Continue taking all lifesaving medications. Including the morning of your surgery with small sip of water.    Please do NOT take on the day of surgery:  Diuretics: examples-  furosemide (Lasix), spironolactone, hydrochlorothiazide  ACE-inhibitors: examples- lisinopril, ramipril, enalapril  “ARBs”: examples- losartan, Olmesartan, valsartan    Please arrive at the hospital/surgery center at the check-in time provided.     An adult will need to bring you and take you home after your surgery.     AFTER YOUR SURGERY  Post op Appointment:   Date: 12/30/2022   Time: 10:45 am   With: Oscar Oliver M.D.   Location: [address] [place] [address]         I  TIME OFF WORK  FMLA or Disability forms can be faxed directly to: [phone] or you may drop them off at [facility] [place] [address]. Our office charges a $35.00 fee per form. Forms will be completed within 10 business days of drop off and payment received. For the status of your forms you may contact our disability office directly [phone].    MEDICATION INSTRUCTIONS **Please read section completely**    The following medications should be stopped a minimum of 10 days prior to surgery:  All over the counter, Supplements & Herbal medications    Anorectics: Phentermine (Adipex-P, Lomaira and Suprenza), Phentermine-topiramate (Qsymia), Bupropion-naltrexone (Contrave)    Opiod Partial Agonists/Opioid Antagonists: Buprenorphine (Subocone, Belbuca, Butrans, Probuphine Implant, Sublocade), Naltrexone (ReVia, Vivitrol), Naloxone    Amphetamines: Dextroamphetamine/Amphetamine (Adderall, Mydayis), Methylphenidate Hydrochloride (Concerta, Metadate, Methylin, Ritalin)    The following medications should be stopped 5 days prior to surgery:  Blood Thinners: Any Aspirin, Aspirin products, anti-inflammatories such as ibuprofen and any blood thinners such as Coumadin and Plavix. Please consult your prescribing physician if you are on life saving blood thinners, in regards to when to stop medications prior to surgery.     The following medications should be stopped a minimum of 3 days prior to surgery:  PDE-5 inhibitors: Sildenafil  (Viagra), Tadalafil (Cialis), Vardenafil (Levitra), Avanafil (Stendra)    MAO Inhibitors: Rasagiline (Azilect), Selegiline (Eldepryl, Emsam, Selapar), Isocarboxazid (Marplan), Phenelzine (Nardil)

## 2022-12-23 NOTE — OR NURSING
Pt arrives from OR to PACU at 0912. Pt identification verified by team, pt placed on all monitors with alarms audible, report and care of pt received from Anesthesiologist and RN. Assessment completed, pt changed into hospital gown and provided with warm blankets. Cold pack applied to knee.    0945- Pt medicated per orders.     1000- Pt rates pain 10/10, medicated with additional dose of IV medication, she is aware of the time frame for oral pain medications to work. Repositioned in [facility].     1015- States pain 5/10 now. Spoke with pt regarding medication for home, she is instructed to take tylenol and ibuprofen for pain along with cold therapy for the knee. Call placed to pt spouse, message left on voicemail.     1110- Pt reports pain 3/10, bp controlled, no nausea. Report to SHANTHI Garcia. Pt transported to phase 2.

## 2022-12-23 NOTE — LETTER
December 2, 2022    Patient Name: Fatoumata Redding  Surgeon Name: Oscar Oliver M.D.  Surgery Facility: [facility] ([address] [facility] [address] [place] [address])  Surgery Date: 12/14/2022 at 9:30 am, check in 8:00 am      If you will not be at one of the below numbers please call the surgery scheduler at [phone]  Preferred Phone: [phone]    BEFORE YOUR SURGERY   Pre Registration and/or Lab Work must be done within and no earlier than 28 days prior to your surgery date. Please call [facility] at [phone] for an appointment as soon as possible.    COVID testing is not required for non-symptomatic vaccinated patients with proof of vaccination at [facility].  For un-vaccinated patients please refer to the following instructions for your COVID testing requirements:    COVID test required 4-7 days prior to surgery, failure to do so can result in a cancellation.    The following locations offer COVID testing:    Approved facilities for COVID testing, if scheduled at [facility]:  · [facility] from 7:30am-3:30pm at [address] [place]  · [facility] [phone] (Please call for an appointment)  · Your local pharmacy    Not scheduled at [facility] contact the scheduled facility for approved testing facilities.    Pre op Appointment:   Date: 12/9/2022   Time: 10:15 am   Provider: Oscar Oliver M.D.   Location: [address] [place] [address]  Instructions: Bring a list of all medications you are taking including the dosing and frequency.        DAY OF YOUR SURGERY  Nothing to eat or drink after midnight     Refrain from smoking any substance after midnight prior to surgery. Smoking may interfere with the anesthetic and frequently produces nausea during the recovery period.    Continue taking all lifesaving medications. Including the morning of your surgery with small sip of water.    Please do NOT take on the day of  surgery:  Diuretics: examples- furosemide (Lasix), spironolactone, hydrochlorothiazide  ACE-inhibitors: examples- lisinopril, ramipril, enalapril  “ARBs”: examples- losartan, Olmesartan, valsartan    Please arrive at the hospital/surgery center at the check-in time provided.     An adult will need to bring you and take you home after your surgery.     AFTER YOUR SURGERY  Post op Appointment:   Date: 12/21/22   Time: 3:15 pm   With: Oscar Oliver M.D.   Location: [address] [place] [address]           TIME OFF WORK  FMLA or Disability forms can be faxed directly to: [phone] or you may drop them off at [address] [place] [phone]. Our office charges a $35.00 fee per form. Forms will be completed within 10 business days of drop off and payment received. For the status of your forms you may contact our disability office directly [phone].    MEDICATION INSTRUCTIONS **Please read section completely**    The following medications should be stopped a minimum of 10 days prior to surgery:  All over the counter, Supplements & Herbal medications    Anorectics: Phentermine (Adipex-P, Lomaira and Suprenza), Phentermine-topiramate (Qsymia), Bupropion-naltrexone (Contrave)    Opiod Partial Agonists/Opioid Antagonists: Buprenorphine (Subocone, Belbuca, Butrans, Probuphine Implant, Sublocade), Naltrexone (ReVia, Vivitrol), Naloxone    Amphetamines: Dextroamphetamine/Amphetamine (Adderall, Mydayis), Methylphenidate Hydrochloride (Concerta, Metadate, Methylin, Ritalin)    The following medications should be stopped 5 days prior to surgery:  Blood Thinners: Any Aspirin, Aspirin products, anti-inflammatories such as ibuprofen and any blood thinners such as Coumadin and Plavix. Please consult your prescribing physician if you are on life saving blood thinners, in regards to when to stop medications prior to surgery.     The following medications should be stopped a minimum of 3 days prior to  surgery:  PDE-5 inhibitors: Sildenafil (Viagra), Tadalafil (Cialis), Vardenafil (Levitra), Avanafil (Stendra)    MAO Inhibitors: Rasagiline (Azilect), Selegiline (Eldepryl, Emsam, Selapar), Isocarboxazid (Marplan), Phenelzine (Nardil)

## 2022-12-23 NOTE — DISCHARGE INSTRUCTIONS
HOME CARE INSTRUCTIONS    ACTIVITY: Rest and take it easy for the first 24 hours.  A responsible adult is recommended to remain with you during that time.  It is normal to feel sleepy.  We encourage you to not do anything that requires balance, judgment or coordination.    FOR 24 HOURS DO NOT:  Drive, operate machinery or run household appliances.  Drink beer or alcoholic beverages.  Make important decisions or sign legal documents.    SPECIAL INSTRUCTIONS: Knee Arthroscopy, Care After  This sheet gives you information about how to care for yourself after your procedure. Your health care provider may also give you more specific instructions. If you have problems or questions, contact your health care provider.  What can I expect after the procedure?  After the procedure, it is common to have:  Soreness.  Swelling.  Pain that can be relieved by taking pain medicine.  Follow these instructions at home:  Incision care  Follow instructions from your health care provider about how to take care of your incisions. Make sure you:  Wash your hands with soap and water before you change your bandage (dressing). If soap and water are not available, use hand .  Change your dressing as told by your health care provider.  Leave stitches (sutures), staples, skin glue, or adhesive strips in place. These skin closures may need to stay in place for 2 weeks or longer. If adhesive strip edges start to loosen and curl up, you may trim the loose edges. Do not remove adhesive strips completely unless your health care provider tells you to do that.  Check your incision areas every day for signs of infection. Check for:  Redness.  More swelling or pain.  Fluid or blood.  Warmth.  Pus or a bad smell.  Bathing  Do not take baths, swim, or use a hot tub until your health care provider approves. Ask your health care provider if you may take showers. You may only be allowed to take sponge baths.  Activity  Do not use your knee to support  your body weight until your health care provider says that you can. Follow weight-bearing restrictions as told. Use crutches or other devices to help you move around (assistive devices) as directed.  Ask your health care provider what activities are safe for you during recovery, and what activities you need to avoid.  If physical therapy was prescribed, do exercises as directed. Doing exercises may help improve knee movement and flexibility (range of motion).  Do not lift anything that is heavier than 10 lb (4.5 kg), or the limit that you are told, until your health care provider says that it is safe.  Driving  Do not drive until your health care provider approves. You may be able to drive after 1-3 weeks.  Do not drive or use heavy machinery while taking prescription pain medicine.  Managing pain, stiffness, and swelling  If directed, put ice on the injured area:  Put ice in a plastic bag or use the icing device (cold therapy unit) that you were given. Follow instructions from your health care provider about how to use the icing device.  Place a towel between your skin and the bag or between your skin and the icing device.  Leave the ice on for 20 minutes, 2-3 times a day.  Move your toes often to avoid stiffness and to lessen swelling.  Raise (elevate) the injured area above the level of your heart while you are sitting or lying down.  If you are taking blood thinners:  Before you take any medicines that contain aspirin or NSAIDs, talk with your health care provider. These medicines increase your risk for dangerous bleeding.  Take your medicine exactly as told, at the same time every day.  Avoid activities that could cause injury or bruising, and follow instructions about how to prevent falls.  Wear a medical alert bracelet or carry a card that lists what medicines you take.  General instructions  Take over-the-counter and prescription medicines only as told by your health care provider.  If you are taking  prescription pain medicine, take actions to prevent or treat constipation. Your health care provider may recommend that you:  Drink enough fluid to keep your urine pale yellow.  Eat foods that are high in fiber, such as fresh fruits and vegetables, whole grains, and beans.  Limit foods that are high in fat and processed sugars, such as fried or sweet foods.  Take an over-the-counter or prescription medicines for constipation.  Do not use any products that contain nicotine or tobacco, such as cigarettes and e-cigarettes. These can delay incision or bone healing. If you need help quitting, ask your health care provider.  Wear compression stockings as told by your health care provider. These stockings help to prevent blood clots and reduce swelling in your legs.  Keep all follow-up visits as told by your health care provider. This is important.  Contact a health care provider if you:  Have a fever.  Have severe pain.  Have redness around an incision.  Have more swelling.  Have fluid or blood coming from an incision.  Notice that an incision feels warm to the touch.  Notice pus or a bad smell coming from an incision.  Notice that an incision opens up.  Develop a rash.  Get help right away if you:  Have difficulty breathing.  Have shortness of breath.  Have chest pain.  Develop pain in your lower leg or at the back of your knee.  Have numbness or tingling in your lower leg or your foot.  Summary  Raise (elevate) the injured area above the level of your heart while you are sitting or lying down.  To help relieve pain and swelling, put ice on your leg for 20 minutes at a time, 2-3 times a day.  If you were prescribed a blood thinner, avoid activities that could cause injury or bruising, and follow instructions about how to prevent falls.  If physical therapy was prescribed, do exercises as directed. Doing exercises may help improve range of motion.  This information is not intended to replace advice given to you by your  health care provider. Make sure you discuss any questions you have with your health care provider.  Document Released: 07/07/2006 Document Revised: 11/30/2018 Document Reviewed: 10/31/2018  [employer] Patient Education © 2020 [employer]    DIET: To avoid nausea, slowly advance diet as tolerated, avoiding spicy or greasy foods for the first day.  Add more substantial food to your diet according to your physician's instructions.  Babies can be fed formula or breast milk as soon as they are hungry.  INCREASE FLUIDS AND FIBER TO AVOID CONSTIPATION.    SURGICAL DRESSING/BATHING: ____    MEDICATIONS: Resume taking daily medication.  Take prescribed pain medication with food.  If no medication is prescribed, you may take non-aspirin pain medication if needed.  PAIN MEDICATION CAN BE VERY CONSTIPATING.  Take a stool softener or laxative such as senokot, pericolace, or milk of magnesia if needed.    Prescription given for ___.  Last pain medication given at _0954 am__.    A follow-up appointment should be arranged with your doctor; call to schedule.    You should CALL YOUR PHYSICIAN if you develop:  Fever greater than 101 degrees F.  Pain not relieved by medication, or persistent nausea or vomiting.  Excessive bleeding (blood soaking through dressing) or unexpected drainage from the wound.  Extreme redness or swelling around the incision site, drainage of pus or foul smelling drainage.  Inability to urinate or empty your bladder within 8 hours.    Problems with breathing or chest pain.  You should call 911 if you develop problems with breathing or chest pain.  If you are unable to contact your doctor or surgical center, you should go to the nearest emergency room or urgent care center.  Dr. Oliver's telephone #: [phone]    MILD FLU-LIKE SYMPTOMS ARE NORMAL.  YOU MAY EXPERIENCE GENERALIZED MUSCLE ACHES, THROAT IRRITATION, HEADACHE AND/OR SOME NAUSEA.    If any questions arise, call your doctor.  If your doctor is not  available, please feel free to call the Surgical Center at [phone].  The Center is open Monday through Friday from 7AM to 7PM.      A registered nurse may call you a few days after your surgery to see how you are doing after your procedure.    You may also receive a survey in the mail within the next two weeks and we ask that you take a few moments to complete the survey and return it to us.  Our goal is to provide you with very good care and we value your comments.     Depression / Suicide Risk    As you are discharged from this [facility] facility, it is important to learn how to keep safe from harming yourself.    Recognize the warning signs:  Abrupt changes in personality, positive or negative- including increase in energy   Giving away possessions  Change in eating patterns- significant weight changes-  positive or negative  Change in sleeping patterns- unable to sleep or sleeping all the time   Unwillingness or inability to communicate  Depression  Unusual sadness, discouragement and loneliness  Talk of wanting to die  Neglect of personal appearance   Rebelliousness- reckless behavior  Withdrawal from people/activities they love  Confusion- inability to concentrate     If you or a loved one observes any of these behaviors or has concerns about self-harm, here's what you can do:  Talk about it- your feelings and reasons for harming yourself  Remove any means that you might use to hurt yourself (examples: pills, rope, extension cords, firearm)  Get professional help from the community (Mental Health, Substance Abuse, psychological counseling)  Do not be alone:Call your Safe Contact- someone whom you trust who will be there for you.  Call your local CRISIS HOTLINE 322-3640 or 776-690-0273  Call your local Children's Mobile Crisis Response Team [place] (136) 796-5285 or www.[employer]  Call the toll free National Suicide Prevention Hotlines   National Suicide Prevention Lifeline 257-280-IWON  (9847)  [facility] 800-SUICIDE (586-6371)    I acknowledge receipt and understanding of these Home Care instructions.

## 2022-12-23 NOTE — OR NURSING
Received pt Ax 4  VSS  Pain nil, nil vomiting , mild nausea  Belongings retrurned  PIV removed  Drinking water  DC instructions given to pt and    As per  she can take tylenol and ibuprofen RTC for pain as per attending  Happy to go home  CDI x 1 nil strikethrough rt knee

## 2022-12-23 NOTE — OP REPORT
DATE OF SERVICE:  12/23/2022     PREOPERATIVE DIAGNOSES:  1.  Complex degenerative-type tear middle and posterior thirds right medial   meniscus.  2.  Degenerative changes, right knee.     POSTOPERATIVE DIAGNOSES:  1.  Complex degenerative-type tear involving the root and posterior horn of   the right medial meniscus.  2.  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  3.  Grade 3 chondromalacia of medial third weightbearing portion of the right   lateral tibial plateau.  4.  Grade IV chondromalacia weightbearing surface of the right medial femoral   condyle.     PROCEDURES PERFORMED:  1.  Right knee arthroscopy with partial medial meniscectomy.  2.  Incidental chondroplasty of some of the unstable articular cartilage on   the weightbearing portion of her right medial femoral condyle.     SURGEON:  Oscar Oliver MD     ANESTHESIA:  General.     ANESTHESIOLOGIST:  Дмитрий Frazier DO     ESTIMATED BLOOD LOSS:  None measurable.     TOTAL TOURNIQUET TIME:  20 minutes.     FLUIDS:  Crystalloids only.     ANTIBIOTICS:  2 g of Kefzol preoperatively.     COMPLICATIONS:  None.     DESCRIPTION OF PROCEDURE:  While in preop holding, I answered all the   patient's and her 's questions.  I placed my initials on the mid   anterior aspect of her right thigh.  After informed consent was given, the   patient was then brought back in the operating room and placed in the supine   position, was given a satisfactory general anesthetic.  I confirmed with the   anesthesiologist that a couple grams of Ancef had been administered   intravenously.  Right knee exam revealed a very large extremity consistent   with her body habitus.  No erythema or ecchymosis, difficult to tell whether   an effusion was present due to the large physical size of the joint.  Passive   range of motion was 1-2 degrees of right knee hyperextension to 125 degrees of   flexion, flexion only being limited because of the large  physical size of the   thigh and the calf.  There was no clinical instability.  Tourniquet was   placed on the proximal right thigh.  Right lower extremity from the tourniquet   to the toes was then prepped and draped in the usual sterile fashion.  Formal   timeout was performed.  After exsanguination of the limb with an Esmarch,   tourniquet was then inflated to 250 mmHg pressure.     From an anterolateral puncture, I injected her right knee with 30 mL of   saline.  Standard anteromedial and anterolateral arthroscopy portals were then   established with #11 blade to the adjacent sides of the patellar tendon at   the level of the joint line.  Scope was placed via the anterolateral portal   into the pouch and other instruments were placed through the anteromedial   portals they were needed.     Systematic thorough examination of her right knee revealed some very mild   low-grade synovitis within the pouch.  No significant plica formation.  No   visible loose bodies.     Initially quite a bit of hypertrophy of the fat pad, and that made it   difficult to visualize the details of the patellofemoral joint intercondylar   notch, utilized a shaver to perform a generous debridement of the fat pad to   improve our ability to visualize the intraarticular structures.    Patellofemoral tracking was normal.  Diffuse grade II chondromalacia of the   patellar articular cartilage.  Focal linear grade IV chondromalacia mid sulcus   of the femoral trochlear groove over an area about 2 cm in length from   proximal to distal and about 4-5 mm width from medial to lateral with   considerable thinning what was remaining of the other articular cartilage on   the trochlear groove.  No mechanical instability of the patella or femoral   cartilage surface in respect to that part of the joint.     From the vantage point to the lateral gutter, we could appreciate mild   synovitis, normal synovial folds and normal popliteus tendon.  Medial  gutter   was normal with the exception of low-grade synovitis.     Exam of the intercondylar notch revealed mild stenosis, normal intact untorn   anterior and posterior cruciate ligaments.     Exam of the lateral compartment revealed grade III changes involving the   medial half of the weightbearing portion of the lateral plateau, manifested by   high-grade sulcus, fibrillation, but no exposed subchondral bone.  The   lateral half of the tibial plateau had some mild grade II chondromalacia.    Articular cartilage of lateral femoral condyle had some grade I chondromalacia   manifested by some yellowing and some softening, but the surface was   mechanically stable.  The lateral meniscus was stable and untorn.  Popliteus   tendon appeared normal.     Exam of the medial compartment revealed diffuse grade III and focal areas of   grade IV chondromalacia on the weightbearing portion of that medial femoral   condyle, manifested by interspersed areas of exposed subchondral bone, high   level delamination and fissuring as well as cobblestoning of the entire   weightbearing portion.  Mechanically unstable flaps of articular cartilage   especially they were more laterally adjacent to the femoral insertion site of   the posterior cruciate ligament.  I did perform an incidental chondroplasty   with a shaver, removing some of those mechanically unstable flaps.  Medial   tibial plateau had diffused grade III changes manifested primarily by   considerable thinning and collapsed down to bone.  respect to the medial   meniscus, the anterior horn of the middle third was normal and untorn without   meniscocapsular separation.  Posterior horn had a complex tear involving the   root, so basically due to the appearance of her tear, it is unlikely that her   medial meniscus as any function from a mechanical standpoint.  There was a   small parrot beak flap based upon the superior aspect of the posterior horn   just medial and posterior to  the root origin.  We then performed a partial   medial meniscectomy utilizing a combination of the shaver and a duckbill punch   until that residual meniscus rim was smooth and stable and contoured.  Only   removed a small percentage of the meniscus, possibly about 5, maybe 10% at   most and repeat probing and inspection confirmed what was remaining was   mechanically stable.  Due to the tear involving the root, it is unlikely that   she has any mechanical function left of the medial meniscus.  I then placed   the scope in the popliteal recess, from that vantage point what was remaining   of the posterior horn was stable.     Tourniquet was deflated, arthroscopic instruments were then removed from the   knee and then 2 incisions were closed with interrupted horizontal mattress   sutures of 3-0 nylon.  From an anterolateral puncture, I injected her knee   with 30 mL of 0.5% ropivacaine, 2 mg of morphine, 1/10th of 1 mL of   epinephrine.  Soft sterile bandage and Ace wrap were then applied.  The leg   was too large to be able to put on compression stocking.  The patient was then   awakened in the operating room and then transported to the recovery room in   stable condition.     POSTOPERATIVE PLAN:  I had a good discussion with her  in recovery   stating that I want her to be relatively active the next week to minimize any   side effects from being too sedentary such as DVT.  Range of motion,   weightbearing is encouraged.  Next surgical step, if needed, would be total   knee arthroplasty in view of the degenerative changes mentioned above.    Although her new baseline until approximately 3-4 months postop.        ______________________________  MD MAYTE GOLDSTEIN/ELIOT    DD:  12/23/2022 09:30  DT:  12/23/2022 12:09    Job#:  [id number]

## 2022-12-23 NOTE — ANESTHESIA PROCEDURE NOTES
Airway    Date/Time: 12/23/2022 8:11 AM  Performed by: Дмитрий Frazier D.O.  Authorized by: Дмитрий Frazier D.O.     Location:  OR  Urgency:  Elective  Indications for Airway Management:  Anesthesia      Spontaneous Ventilation: absent    Sedation Level:  Deep  Preoxygenated: Yes    Patient Position:  Sniffing  Mask Difficulty Assessment:  2 - vent by mask + OA or adjuvant +/- NMBA  Final Airway Type:  Endotracheal airway  Final Endotracheal Airway:  ETT  Cuffed: Yes    Technique Used for Successful ETT Placement:  Direct laryngoscopy    Insertion Site:  Oral  Blade Type:  Aditya  Laryngoscope Blade/Videolaryngoscope Blade Size:  3  ETT Size (mm):  7.0  Measured from:  Lips  ETT to Lips (cm):  21  Placement Verified by: auscultation and capnometry    Cormack-Lehane Classification:  Grade I - full view of glottis  Number of Attempts at Approach:  1

## 2022-12-23 NOTE — ANESTHESIA TIME REPORT
Anesthesia Start and Stop Event Times     Date Time Event    12/23/2022 0730 Anesthesia Start     0917 Anesthesia Stop        Responsible Staff  12/23/22    Name Role Begin End    Дмитрий Frazier D.O. Anesth 0730 0917        Overtime Reason:  per billy, locums, etc.    Comments:

## 2022-12-23 NOTE — ANESTHESIA PREPROCEDURE EVALUATION
Case: [id number] Date/Time: 12/23/22 0800    Procedure: Right knee arthroscopy, partial medial menisectomy. (Right)    Diagnosis: Medial meniscus tear [S83.249A]    Pre-op diagnosis: Medial meniscus tear [S83.249A]    Location: [place] [address] / SURGERY [facility]    Surgeons: Oscar Oliver M.D.          Relevant Problems   CARDIAC   (positive) Bilateral carotid artery stenosis   (positive) Carotid atherosclerosis, bilateral   (positive) Essential hypertension      GI   (positive) Esophageal reflux      ENDO   (positive) Hypothyroid       Physical Exam    Airway   Mallampati: II  TM distance: >3 FB  Neck ROM: full       Cardiovascular - normal exam  Rhythm: regular  Rate: normal  (-) murmur     Dental - normal exam           Pulmonary - normal exam  Breath sounds clear to auscultation     Abdominal    Neurological - normal exam                 Anesthesia Plan    ASA 3       Plan - general       Airway plan will be ETT          Induction: intravenous    Postoperative Plan: Postoperative administration of opioids is intended.    Pertinent diagnostic labs and testing reviewed    Informed Consent:    Anesthetic plan and risks discussed with patient.    Use of blood products discussed with: patient whom consented to blood products.

## 2023-01-16 DIAGNOSIS — F32.9 REACTIVE DEPRESSION (SITUATIONAL): ICD-10-CM

## 2023-01-16 DIAGNOSIS — Z63.9 FAMILY PROBLEMS: ICD-10-CM

## 2023-01-16 SDOH — SOCIAL STABILITY - SOCIAL INSECURITY: PROBLEM RELATED TO PRIMARY SUPPORT GROUP, UNSPECIFIED: Z63.9

## 2023-02-06 DIAGNOSIS — K21.9 GASTROESOPHAGEAL REFLUX DISEASE WITHOUT ESOPHAGITIS: ICD-10-CM

## 2023-02-06 RX ORDER — PANTOPRAZOLE SODIUM 20 MG/1
20 TABLET, DELAYED RELEASE ORAL DAILY
Qty: 90 TABLET | Refills: 3 | Status: SHIPPED | OUTPATIENT
Start: 2023-02-06 | End: 2024-02-14

## 2023-02-23 DIAGNOSIS — E03.8 HYPOTHYROIDISM DUE TO HASHIMOTO'S THYROIDITIS: ICD-10-CM

## 2023-02-23 DIAGNOSIS — E06.3 HYPOTHYROIDISM DUE TO HASHIMOTO'S THYROIDITIS: ICD-10-CM

## 2023-02-24 DIAGNOSIS — I65.23 CAROTID ATHEROSCLEROSIS, BILATERAL: ICD-10-CM

## 2023-02-24 DIAGNOSIS — Z98.890 HISTORY OF GASTRIC SURGERY: ICD-10-CM

## 2023-02-24 DIAGNOSIS — I10 ESSENTIAL HYPERTENSION: ICD-10-CM

## 2023-02-24 DIAGNOSIS — E06.3 HASHIMOTO'S THYROIDITIS: ICD-10-CM

## 2023-02-24 RX ORDER — LEVOTHYROXINE SODIUM 175 UG/1
175 TABLET ORAL
Qty: 90 TABLET | Refills: 0 | Status: SHIPPED | OUTPATIENT
Start: 2023-02-24 | End: 2023-03-29 | Stop reason: SDUPTHER

## 2023-03-07 ENCOUNTER — HOSPITAL ENCOUNTER (OUTPATIENT)
Dept: LAB | Facility: MEDICAL CENTER | Age: 63
End: 2023-03-07
Attending: FAMILY MEDICINE
Payer: COMMERCIAL

## 2023-03-07 DIAGNOSIS — I65.23 CAROTID ATHEROSCLEROSIS, BILATERAL: ICD-10-CM

## 2023-03-07 DIAGNOSIS — Z98.890 HISTORY OF GASTRIC SURGERY: ICD-10-CM

## 2023-03-07 DIAGNOSIS — E06.3 HASHIMOTO'S THYROIDITIS: ICD-10-CM

## 2023-03-07 DIAGNOSIS — I10 ESSENTIAL HYPERTENSION: ICD-10-CM

## 2023-03-07 LAB
ALBUMIN SERPL BCP-MCNC: 3.9 G/DL (ref 3.2–4.9)
ALBUMIN/GLOB SERPL: 1.3 G/DL
ALP SERPL-CCNC: 67 U/L (ref 30–99)
ALT SERPL-CCNC: 13 U/L (ref 2–50)
ANION GAP SERPL CALC-SCNC: 9 MMOL/L (ref 7–16)
AST SERPL-CCNC: 17 U/L (ref 12–45)
BILIRUB SERPL-MCNC: 0.3 MG/DL (ref 0.1–1.5)
BUN SERPL-MCNC: 7 MG/DL (ref 8–22)
CALCIUM ALBUM COR SERPL-MCNC: 9.4 MG/DL (ref 8.5–10.5)
CALCIUM SERPL-MCNC: 9.3 MG/DL (ref 8.5–10.5)
CHLORIDE SERPL-SCNC: 107 MMOL/L (ref 96–112)
CHOLEST SERPL-MCNC: 141 MG/DL (ref 100–199)
CO2 SERPL-SCNC: 26 MMOL/L (ref 20–33)
CREAT SERPL-MCNC: 0.67 MG/DL (ref 0.5–1.4)
ERYTHROCYTE [DISTWIDTH] IN BLOOD BY AUTOMATED COUNT: 43.1 FL (ref 35.9–50)
FASTING STATUS PATIENT QL REPORTED: NORMAL
GFR SERPLBLD CREATININE-BSD FMLA CKD-EPI: 99 ML/MIN/1.73 M 2
GLOBULIN SER CALC-MCNC: 2.9 G/DL (ref 1.9–3.5)
GLUCOSE SERPL-MCNC: 83 MG/DL (ref 65–99)
HCT VFR BLD AUTO: 43.9 % (ref 37–47)
HDLC SERPL-MCNC: 62 MG/DL
HGB BLD-MCNC: 14.5 G/DL (ref 12–16)
LDLC SERPL CALC-MCNC: 65 MG/DL
MCH RBC QN AUTO: 29.4 PG (ref 27–33)
MCHC RBC AUTO-ENTMCNC: 33 G/DL (ref 33.6–35)
MCV RBC AUTO: 88.9 FL (ref 81.4–97.8)
PLATELET # BLD AUTO: 249 K/UL (ref 164–446)
PMV BLD AUTO: 10.8 FL (ref 9–12.9)
POTASSIUM SERPL-SCNC: 4.6 MMOL/L (ref 3.6–5.5)
PROT SERPL-MCNC: 6.8 G/DL (ref 6–8.2)
RBC # BLD AUTO: 4.94 M/UL (ref 4.2–5.4)
SODIUM SERPL-SCNC: 142 MMOL/L (ref 135–145)
TRIGL SERPL-MCNC: 68 MG/DL (ref 0–149)
TSH SERPL DL<=0.005 MIU/L-ACNC: 2.31 UIU/ML (ref 0.38–5.33)
WBC # BLD AUTO: 8 K/UL (ref 4.8–10.8)

## 2023-03-07 PROCEDURE — 85027 COMPLETE CBC AUTOMATED: CPT

## 2023-03-07 PROCEDURE — 80061 LIPID PANEL: CPT

## 2023-03-07 PROCEDURE — 80053 COMPREHEN METABOLIC PANEL: CPT

## 2023-03-07 PROCEDURE — 36415 COLL VENOUS BLD VENIPUNCTURE: CPT

## 2023-03-07 PROCEDURE — 84443 ASSAY THYROID STIM HORMONE: CPT

## 2023-03-08 ENCOUNTER — TELEPHONE (OUTPATIENT)
Dept: MEDICAL GROUP | Facility: MEDICAL CENTER | Age: 63
End: 2023-03-08
Payer: COMMERCIAL

## 2023-03-08 ENCOUNTER — HOSPITAL ENCOUNTER (INPATIENT)
Facility: MEDICAL CENTER | Age: 63
LOS: 3 days | DRG: 392 | End: 2023-03-11
Attending: EMERGENCY MEDICINE | Admitting: STUDENT IN AN ORGANIZED HEALTH CARE EDUCATION/TRAINING PROGRAM
Payer: COMMERCIAL

## 2023-03-08 ENCOUNTER — APPOINTMENT (OUTPATIENT)
Dept: RADIOLOGY | Facility: MEDICAL CENTER | Age: 63
DRG: 392 | End: 2023-03-08
Attending: EMERGENCY MEDICINE
Payer: COMMERCIAL

## 2023-03-08 DIAGNOSIS — K57.92 DIVERTICULITIS: ICD-10-CM

## 2023-03-08 PROBLEM — K57.32 SIGMOID DIVERTICULITIS: Status: ACTIVE | Noted: 2023-03-08

## 2023-03-08 PROBLEM — R01.1 HEART MURMUR: Status: ACTIVE | Noted: 2023-03-08

## 2023-03-08 LAB
ALBUMIN SERPL BCP-MCNC: 4.3 G/DL (ref 3.2–4.9)
ALBUMIN/GLOB SERPL: 1.2 G/DL
ALP SERPL-CCNC: 76 U/L (ref 30–99)
ALT SERPL-CCNC: 12 U/L (ref 2–50)
ANION GAP SERPL CALC-SCNC: 12 MMOL/L (ref 7–16)
AST SERPL-CCNC: 12 U/L (ref 12–45)
BASOPHILS # BLD AUTO: 0.3 % (ref 0–1.8)
BASOPHILS # BLD: 0.04 K/UL (ref 0–0.12)
BILIRUB SERPL-MCNC: 0.6 MG/DL (ref 0.1–1.5)
BUN SERPL-MCNC: 6 MG/DL (ref 8–22)
CALCIUM ALBUM COR SERPL-MCNC: 9.2 MG/DL (ref 8.5–10.5)
CALCIUM SERPL-MCNC: 9.4 MG/DL (ref 8.5–10.5)
CHLORIDE SERPL-SCNC: 102 MMOL/L (ref 96–112)
CO2 SERPL-SCNC: 25 MMOL/L (ref 20–33)
CREAT SERPL-MCNC: 0.65 MG/DL (ref 0.5–1.4)
EKG IMPRESSION: NORMAL
EOSINOPHIL # BLD AUTO: 0.01 K/UL (ref 0–0.51)
EOSINOPHIL NFR BLD: 0.1 % (ref 0–6.9)
ERYTHROCYTE [DISTWIDTH] IN BLOOD BY AUTOMATED COUNT: 42.6 FL (ref 35.9–50)
GFR SERPLBLD CREATININE-BSD FMLA CKD-EPI: 100 ML/MIN/1.73 M 2
GLOBULIN SER CALC-MCNC: 3.5 G/DL (ref 1.9–3.5)
GLUCOSE SERPL-MCNC: 146 MG/DL (ref 65–99)
HCT VFR BLD AUTO: 45.4 % (ref 37–47)
HGB BLD-MCNC: 14.6 G/DL (ref 12–16)
IMM GRANULOCYTES # BLD AUTO: 0.06 K/UL (ref 0–0.11)
IMM GRANULOCYTES NFR BLD AUTO: 0.4 % (ref 0–0.9)
LACTATE SERPL-SCNC: 1.1 MMOL/L (ref 0.5–2)
LIPASE SERPL-CCNC: 10 U/L (ref 11–82)
LYMPHOCYTES # BLD AUTO: 1.07 K/UL (ref 1–4.8)
LYMPHOCYTES NFR BLD: 6.9 % (ref 22–41)
MCH RBC QN AUTO: 28.9 PG (ref 27–33)
MCHC RBC AUTO-ENTMCNC: 32.2 G/DL (ref 33.6–35)
MCV RBC AUTO: 89.7 FL (ref 81.4–97.8)
MONOCYTES # BLD AUTO: 1.37 K/UL (ref 0–0.85)
MONOCYTES NFR BLD AUTO: 8.8 % (ref 0–13.4)
NEUTROPHILS # BLD AUTO: 13.04 K/UL (ref 2–7.15)
NEUTROPHILS NFR BLD: 83.5 % (ref 44–72)
NRBC # BLD AUTO: 0 K/UL
NRBC BLD-RTO: 0 /100 WBC
PLATELET # BLD AUTO: 276 K/UL (ref 164–446)
PMV BLD AUTO: 9.7 FL (ref 9–12.9)
POTASSIUM SERPL-SCNC: 4 MMOL/L (ref 3.6–5.5)
PROT SERPL-MCNC: 7.8 G/DL (ref 6–8.2)
RBC # BLD AUTO: 5.06 M/UL (ref 4.2–5.4)
SODIUM SERPL-SCNC: 139 MMOL/L (ref 135–145)
WBC # BLD AUTO: 15.6 K/UL (ref 4.8–10.8)

## 2023-03-08 PROCEDURE — 96365 THER/PROPH/DIAG IV INF INIT: CPT

## 2023-03-08 PROCEDURE — 700111 HCHG RX REV CODE 636 W/ 250 OVERRIDE (IP): Performed by: STUDENT IN AN ORGANIZED HEALTH CARE EDUCATION/TRAINING PROGRAM

## 2023-03-08 PROCEDURE — 700111 HCHG RX REV CODE 636 W/ 250 OVERRIDE (IP): Performed by: EMERGENCY MEDICINE

## 2023-03-08 PROCEDURE — 93005 ELECTROCARDIOGRAM TRACING: CPT | Performed by: STUDENT IN AN ORGANIZED HEALTH CARE EDUCATION/TRAINING PROGRAM

## 2023-03-08 PROCEDURE — 99285 EMERGENCY DEPT VISIT HI MDM: CPT

## 2023-03-08 PROCEDURE — 85025 COMPLETE CBC W/AUTO DIFF WBC: CPT

## 2023-03-08 PROCEDURE — 83690 ASSAY OF LIPASE: CPT

## 2023-03-08 PROCEDURE — 700117 HCHG RX CONTRAST REV CODE 255: Performed by: EMERGENCY MEDICINE

## 2023-03-08 PROCEDURE — A9270 NON-COVERED ITEM OR SERVICE: HCPCS | Performed by: STUDENT IN AN ORGANIZED HEALTH CARE EDUCATION/TRAINING PROGRAM

## 2023-03-08 PROCEDURE — 36415 COLL VENOUS BLD VENIPUNCTURE: CPT

## 2023-03-08 PROCEDURE — 74177 CT ABD & PELVIS W/CONTRAST: CPT

## 2023-03-08 PROCEDURE — 700102 HCHG RX REV CODE 250 W/ 637 OVERRIDE(OP): Performed by: STUDENT IN AN ORGANIZED HEALTH CARE EDUCATION/TRAINING PROGRAM

## 2023-03-08 PROCEDURE — 80053 COMPREHEN METABOLIC PANEL: CPT

## 2023-03-08 PROCEDURE — 87040 BLOOD CULTURE FOR BACTERIA: CPT

## 2023-03-08 PROCEDURE — 700101 HCHG RX REV CODE 250: Performed by: EMERGENCY MEDICINE

## 2023-03-08 PROCEDURE — 770006 HCHG ROOM/CARE - MED/SURG/GYN SEMI*

## 2023-03-08 PROCEDURE — 96375 TX/PRO/DX INJ NEW DRUG ADDON: CPT

## 2023-03-08 PROCEDURE — 83605 ASSAY OF LACTIC ACID: CPT

## 2023-03-08 PROCEDURE — 700105 HCHG RX REV CODE 258: Performed by: STUDENT IN AN ORGANIZED HEALTH CARE EDUCATION/TRAINING PROGRAM

## 2023-03-08 PROCEDURE — 96372 THER/PROPH/DIAG INJ SC/IM: CPT

## 2023-03-08 PROCEDURE — 99223 1ST HOSP IP/OBS HIGH 75: CPT | Mod: AI,GC | Performed by: STUDENT IN AN ORGANIZED HEALTH CARE EDUCATION/TRAINING PROGRAM

## 2023-03-08 PROCEDURE — 93010 ELECTROCARDIOGRAM REPORT: CPT | Performed by: INTERNAL MEDICINE

## 2023-03-08 RX ORDER — ONDANSETRON 2 MG/ML
4 INJECTION INTRAMUSCULAR; INTRAVENOUS EVERY 4 HOURS PRN
Status: DISCONTINUED | OUTPATIENT
Start: 2023-03-08 | End: 2023-03-11 | Stop reason: HOSPADM

## 2023-03-08 RX ORDER — OMEPRAZOLE 20 MG/1
20 CAPSULE, DELAYED RELEASE ORAL DAILY
Status: DISCONTINUED | OUTPATIENT
Start: 2023-03-09 | End: 2023-03-11 | Stop reason: HOSPADM

## 2023-03-08 RX ORDER — SODIUM CHLORIDE, SODIUM LACTATE, POTASSIUM CHLORIDE, CALCIUM CHLORIDE 600; 310; 30; 20 MG/100ML; MG/100ML; MG/100ML; MG/100ML
INJECTION, SOLUTION INTRAVENOUS CONTINUOUS
Status: DISCONTINUED | OUTPATIENT
Start: 2023-03-08 | End: 2023-03-11

## 2023-03-08 RX ORDER — ROSUVASTATIN CALCIUM 20 MG/1
10 TABLET, COATED ORAL EVERY EVENING
Status: DISCONTINUED | OUTPATIENT
Start: 2023-03-08 | End: 2023-03-11 | Stop reason: HOSPADM

## 2023-03-08 RX ORDER — ENOXAPARIN SODIUM 100 MG/ML
40 INJECTION SUBCUTANEOUS DAILY
Status: DISCONTINUED | OUTPATIENT
Start: 2023-03-08 | End: 2023-03-11 | Stop reason: HOSPADM

## 2023-03-08 RX ORDER — MORPHINE SULFATE 4 MG/ML
4 INJECTION INTRAVENOUS
Status: DISCONTINUED | OUTPATIENT
Start: 2023-03-08 | End: 2023-03-11 | Stop reason: HOSPADM

## 2023-03-08 RX ORDER — SODIUM CHLORIDE, SODIUM LACTATE, POTASSIUM CHLORIDE, AND CALCIUM CHLORIDE .6; .31; .03; .02 G/100ML; G/100ML; G/100ML; G/100ML
1000 INJECTION, SOLUTION INTRAVENOUS ONCE
Status: COMPLETED | OUTPATIENT
Start: 2023-03-08 | End: 2023-03-08

## 2023-03-08 RX ORDER — LOSARTAN POTASSIUM 50 MG/1
50 TABLET ORAL DAILY
Status: DISCONTINUED | OUTPATIENT
Start: 2023-03-09 | End: 2023-03-11 | Stop reason: HOSPADM

## 2023-03-08 RX ORDER — ONDANSETRON 2 MG/ML
4 INJECTION INTRAMUSCULAR; INTRAVENOUS ONCE
Status: COMPLETED | OUTPATIENT
Start: 2023-03-08 | End: 2023-03-08

## 2023-03-08 RX ORDER — METRONIDAZOLE 500 MG/100ML
500 INJECTION, SOLUTION INTRAVENOUS ONCE
Status: COMPLETED | OUTPATIENT
Start: 2023-03-08 | End: 2023-03-08

## 2023-03-08 RX ORDER — CEFTRIAXONE 2 G/1
2000 INJECTION, POWDER, FOR SOLUTION INTRAMUSCULAR; INTRAVENOUS ONCE
Status: COMPLETED | OUTPATIENT
Start: 2023-03-08 | End: 2023-03-08

## 2023-03-08 RX ORDER — MORPHINE SULFATE 4 MG/ML
4 INJECTION INTRAVENOUS ONCE
Status: COMPLETED | OUTPATIENT
Start: 2023-03-08 | End: 2023-03-08

## 2023-03-08 RX ORDER — METRONIDAZOLE 500 MG/100ML
500 INJECTION, SOLUTION INTRAVENOUS EVERY 8 HOURS
Status: DISCONTINUED | OUTPATIENT
Start: 2023-03-09 | End: 2023-03-10

## 2023-03-08 RX ADMIN — IOHEXOL 100 ML: 350 INJECTION, SOLUTION INTRAVENOUS at 19:15

## 2023-03-08 RX ADMIN — MORPHINE SULFATE 4 MG: 4 INJECTION INTRAVENOUS at 21:50

## 2023-03-08 RX ADMIN — ENOXAPARIN SODIUM 40 MG: 40 INJECTION SUBCUTANEOUS at 20:17

## 2023-03-08 RX ADMIN — ONDANSETRON 4 MG: 2 INJECTION, SOLUTION INTRAMUSCULAR; INTRAVENOUS at 18:59

## 2023-03-08 RX ADMIN — ONDANSETRON 4 MG: 2 INJECTION INTRAMUSCULAR; INTRAVENOUS at 22:30

## 2023-03-08 RX ADMIN — SODIUM CHLORIDE, POTASSIUM CHLORIDE, SODIUM LACTATE AND CALCIUM CHLORIDE 1000 ML: 600; 310; 30; 20 INJECTION, SOLUTION INTRAVENOUS at 20:13

## 2023-03-08 RX ADMIN — ROSUVASTATIN CALCIUM 10 MG: 20 TABLET, FILM COATED ORAL at 21:50

## 2023-03-08 RX ADMIN — MORPHINE SULFATE 4 MG: 4 INJECTION INTRAVENOUS at 18:59

## 2023-03-08 RX ADMIN — CEFTRIAXONE SODIUM 2000 MG: 2 INJECTION, POWDER, FOR SOLUTION INTRAMUSCULAR; INTRAVENOUS at 20:02

## 2023-03-08 RX ADMIN — METRONIDAZOLE 500 MG: 5 INJECTION, SOLUTION INTRAVENOUS at 20:09

## 2023-03-08 RX ADMIN — SODIUM CHLORIDE, POTASSIUM CHLORIDE, SODIUM LACTATE AND CALCIUM CHLORIDE: 600; 310; 30; 20 INJECTION, SOLUTION INTRAVENOUS at 21:50

## 2023-03-08 ASSESSMENT — ENCOUNTER SYMPTOMS
COUGH: 0
DIARRHEA: 0
CHILLS: 1
MYALGIAS: 0
PALPITATIONS: 0
BLURRED VISION: 0
WEAKNESS: 1
HEADACHES: 0
BACK PAIN: 0
SHORTNESS OF BREATH: 0
DIZZINESS: 0
NAUSEA: 1
CONSTIPATION: 0
HEARTBURN: 0
WEIGHT LOSS: 0
NECK PAIN: 0
FEVER: 0
DOUBLE VISION: 0
ABDOMINAL PAIN: 1
VOMITING: 1
BLOOD IN STOOL: 0

## 2023-03-08 ASSESSMENT — PAIN DESCRIPTION - PAIN TYPE
TYPE: ACUTE PAIN

## 2023-03-08 ASSESSMENT — FIBROSIS 4 INDEX
FIB4 SCORE: 0.77
FIB4 SCORE: 1.16

## 2023-03-08 NOTE — TELEPHONE ENCOUNTER
Pt LVM requesting call for abdominal pain. When I got ahold of her, she stated she could not move or get out of bed due to the severity of her pain. Sx started last night. Pt reports dizziness with movement, lightheadedness, abdominal discomfort, abdominal distension, and extremity weakness. Pt stated she has hx of bowel blockage and mentioned concern of a 2nd episode. Due to pt's inability to move, [facility] was called to the pt's home for evaluation. Pt stated if she did not go with [facility], her spouse would take her to the ER.

## 2023-03-09 PROBLEM — R55 SYNCOPE: Status: RESOLVED | Noted: 2023-03-09 | Resolved: 2023-03-09

## 2023-03-09 PROBLEM — R55 SYNCOPE: Status: ACTIVE | Noted: 2023-03-09

## 2023-03-09 PROBLEM — R42 POSTURAL DIZZINESS WITH PRESYNCOPE: Status: ACTIVE | Noted: 2023-03-09

## 2023-03-09 PROBLEM — R55 POSTURAL DIZZINESS WITH PRESYNCOPE: Status: ACTIVE | Noted: 2023-03-09

## 2023-03-09 LAB
ALBUMIN SERPL BCP-MCNC: 3.6 G/DL (ref 3.2–4.9)
ALBUMIN/GLOB SERPL: 1.3 G/DL
ALP SERPL-CCNC: 59 U/L (ref 30–99)
ALT SERPL-CCNC: 12 U/L (ref 2–50)
ANION GAP SERPL CALC-SCNC: 8 MMOL/L (ref 7–16)
APPEARANCE UR: CLEAR
AST SERPL-CCNC: 10 U/L (ref 12–45)
BACTERIA #/AREA URNS HPF: NEGATIVE /HPF
BASOPHILS # BLD AUTO: 0.5 % (ref 0–1.8)
BASOPHILS # BLD: 0.05 K/UL (ref 0–0.12)
BILIRUB SERPL-MCNC: 0.4 MG/DL (ref 0.1–1.5)
BILIRUB UR QL STRIP.AUTO: NEGATIVE
BUN SERPL-MCNC: 6 MG/DL (ref 8–22)
CALCIUM ALBUM COR SERPL-MCNC: 9.1 MG/DL (ref 8.5–10.5)
CALCIUM SERPL-MCNC: 8.8 MG/DL (ref 8.5–10.5)
CHLORIDE SERPL-SCNC: 104 MMOL/L (ref 96–112)
CO2 SERPL-SCNC: 26 MMOL/L (ref 20–33)
COLOR UR: YELLOW
CREAT SERPL-MCNC: 0.53 MG/DL (ref 0.5–1.4)
EOSINOPHIL # BLD AUTO: 0.08 K/UL (ref 0–0.51)
EOSINOPHIL NFR BLD: 0.8 % (ref 0–6.9)
EPI CELLS #/AREA URNS HPF: NEGATIVE /HPF
ERYTHROCYTE [DISTWIDTH] IN BLOOD BY AUTOMATED COUNT: 41.9 FL (ref 35.9–50)
GFR SERPLBLD CREATININE-BSD FMLA CKD-EPI: 105 ML/MIN/1.73 M 2
GLOBULIN SER CALC-MCNC: 2.7 G/DL (ref 1.9–3.5)
GLUCOSE SERPL-MCNC: 88 MG/DL (ref 65–99)
GLUCOSE UR STRIP.AUTO-MCNC: NEGATIVE MG/DL
HCT VFR BLD AUTO: 38 % (ref 37–47)
HGB BLD-MCNC: 12.6 G/DL (ref 12–16)
HYALINE CASTS #/AREA URNS LPF: ABNORMAL /LPF
IMM GRANULOCYTES # BLD AUTO: 0.03 K/UL (ref 0–0.11)
IMM GRANULOCYTES NFR BLD AUTO: 0.3 % (ref 0–0.9)
KETONES UR STRIP.AUTO-MCNC: ABNORMAL MG/DL
LEUKOCYTE ESTERASE UR QL STRIP.AUTO: NEGATIVE
LYMPHOCYTES # BLD AUTO: 1.63 K/UL (ref 1–4.8)
LYMPHOCYTES NFR BLD: 15.7 % (ref 22–41)
MAGNESIUM SERPL-MCNC: 2.2 MG/DL (ref 1.5–2.5)
MCH RBC QN AUTO: 29 PG (ref 27–33)
MCHC RBC AUTO-ENTMCNC: 33.2 G/DL (ref 33.6–35)
MCV RBC AUTO: 87.6 FL (ref 81.4–97.8)
MICRO URNS: ABNORMAL
MONOCYTES # BLD AUTO: 0.79 K/UL (ref 0–0.85)
MONOCYTES NFR BLD AUTO: 7.6 % (ref 0–13.4)
NEUTROPHILS # BLD AUTO: 7.77 K/UL (ref 2–7.15)
NEUTROPHILS NFR BLD: 75.1 % (ref 44–72)
NITRITE UR QL STRIP.AUTO: NEGATIVE
NRBC # BLD AUTO: 0 K/UL
NRBC BLD-RTO: 0 /100 WBC
PH UR STRIP.AUTO: 5 [PH] (ref 5–8)
PLATELET # BLD AUTO: 235 K/UL (ref 164–446)
PMV BLD AUTO: 10.5 FL (ref 9–12.9)
POTASSIUM SERPL-SCNC: 3.7 MMOL/L (ref 3.6–5.5)
PROT SERPL-MCNC: 6.3 G/DL (ref 6–8.2)
PROT UR QL STRIP: NEGATIVE MG/DL
RBC # BLD AUTO: 4.34 M/UL (ref 4.2–5.4)
RBC # URNS HPF: ABNORMAL /HPF
RBC UR QL AUTO: ABNORMAL
SODIUM SERPL-SCNC: 138 MMOL/L (ref 135–145)
SP GR UR STRIP.AUTO: >=1.045
UROBILINOGEN UR STRIP.AUTO-MCNC: 0.2 MG/DL
WBC # BLD AUTO: 10.4 K/UL (ref 4.8–10.8)
WBC #/AREA URNS HPF: ABNORMAL /HPF

## 2023-03-09 PROCEDURE — 36415 COLL VENOUS BLD VENIPUNCTURE: CPT

## 2023-03-09 PROCEDURE — 85025 COMPLETE CBC W/AUTO DIFF WBC: CPT

## 2023-03-09 PROCEDURE — 700111 HCHG RX REV CODE 636 W/ 250 OVERRIDE (IP): Performed by: STUDENT IN AN ORGANIZED HEALTH CARE EDUCATION/TRAINING PROGRAM

## 2023-03-09 PROCEDURE — A9270 NON-COVERED ITEM OR SERVICE: HCPCS

## 2023-03-09 PROCEDURE — 87040 BLOOD CULTURE FOR BACTERIA: CPT

## 2023-03-09 PROCEDURE — 700101 HCHG RX REV CODE 250: Performed by: STUDENT IN AN ORGANIZED HEALTH CARE EDUCATION/TRAINING PROGRAM

## 2023-03-09 PROCEDURE — 700102 HCHG RX REV CODE 250 W/ 637 OVERRIDE(OP): Performed by: STUDENT IN AN ORGANIZED HEALTH CARE EDUCATION/TRAINING PROGRAM

## 2023-03-09 PROCEDURE — 83735 ASSAY OF MAGNESIUM: CPT

## 2023-03-09 PROCEDURE — 99232 SBSQ HOSP IP/OBS MODERATE 35: CPT | Mod: GC | Performed by: HOSPITALIST

## 2023-03-09 PROCEDURE — 80053 COMPREHEN METABOLIC PANEL: CPT

## 2023-03-09 PROCEDURE — 770006 HCHG ROOM/CARE - MED/SURG/GYN SEMI*

## 2023-03-09 PROCEDURE — 700105 HCHG RX REV CODE 258: Performed by: STUDENT IN AN ORGANIZED HEALTH CARE EDUCATION/TRAINING PROGRAM

## 2023-03-09 PROCEDURE — 700102 HCHG RX REV CODE 250 W/ 637 OVERRIDE(OP)

## 2023-03-09 PROCEDURE — A9270 NON-COVERED ITEM OR SERVICE: HCPCS | Performed by: STUDENT IN AN ORGANIZED HEALTH CARE EDUCATION/TRAINING PROGRAM

## 2023-03-09 PROCEDURE — 81001 URINALYSIS AUTO W/SCOPE: CPT

## 2023-03-09 RX ORDER — ACETAMINOPHEN 500 MG
1000 TABLET ORAL EVERY 6 HOURS PRN
Status: DISCONTINUED | OUTPATIENT
Start: 2023-03-09 | End: 2023-03-11 | Stop reason: HOSPADM

## 2023-03-09 RX ORDER — METOCLOPRAMIDE HYDROCHLORIDE 5 MG/ML
10 INJECTION INTRAMUSCULAR; INTRAVENOUS EVERY 6 HOURS PRN
Status: DISCONTINUED | OUTPATIENT
Start: 2023-03-09 | End: 2023-03-11 | Stop reason: HOSPADM

## 2023-03-09 RX ADMIN — ENOXAPARIN SODIUM 40 MG: 40 INJECTION SUBCUTANEOUS at 17:57

## 2023-03-09 RX ADMIN — METRONIDAZOLE 500 MG: 5 INJECTION, SOLUTION INTRAVENOUS at 13:20

## 2023-03-09 RX ADMIN — SODIUM CHLORIDE, POTASSIUM CHLORIDE, SODIUM LACTATE AND CALCIUM CHLORIDE: 600; 310; 30; 20 INJECTION, SOLUTION INTRAVENOUS at 20:51

## 2023-03-09 RX ADMIN — LEVOTHYROXINE SODIUM 175 MCG: 0.1 TABLET ORAL at 05:21

## 2023-03-09 RX ADMIN — OMEPRAZOLE 20 MG: 20 CAPSULE, DELAYED RELEASE ORAL at 05:21

## 2023-03-09 RX ADMIN — METRONIDAZOLE 500 MG: 5 INJECTION, SOLUTION INTRAVENOUS at 05:33

## 2023-03-09 RX ADMIN — ACETAMINOPHEN 1000 MG: 500 TABLET, FILM COATED ORAL at 10:14

## 2023-03-09 RX ADMIN — ONDANSETRON 4 MG: 2 INJECTION INTRAMUSCULAR; INTRAVENOUS at 09:44

## 2023-03-09 RX ADMIN — SODIUM CHLORIDE, POTASSIUM CHLORIDE, SODIUM LACTATE AND CALCIUM CHLORIDE: 600; 310; 30; 20 INJECTION, SOLUTION INTRAVENOUS at 05:33

## 2023-03-09 RX ADMIN — METRONIDAZOLE 500 MG: 5 INJECTION, SOLUTION INTRAVENOUS at 20:49

## 2023-03-09 RX ADMIN — CEFTRIAXONE SODIUM 2000 MG: 10 INJECTION, POWDER, FOR SOLUTION INTRAVENOUS at 05:22

## 2023-03-09 RX ADMIN — LOSARTAN POTASSIUM 50 MG: 50 TABLET, FILM COATED ORAL at 05:21

## 2023-03-09 RX ADMIN — ROSUVASTATIN CALCIUM 10 MG: 20 TABLET, FILM COATED ORAL at 17:58

## 2023-03-09 RX ADMIN — ASPIRIN 81 MG: 81 TABLET, COATED ORAL at 05:21

## 2023-03-09 ASSESSMENT — LIFESTYLE VARIABLES
ALCOHOL_USE: NO
HOW MANY TIMES IN THE PAST YEAR HAVE YOU HAD 5 OR MORE DRINKS IN A DAY: 0
EVER FELT BAD OR GUILTY ABOUT YOUR DRINKING: NO
TOTAL SCORE: 0
HAVE YOU EVER FELT YOU SHOULD CUT DOWN ON YOUR DRINKING: NO
ON A TYPICAL DAY WHEN YOU DRINK ALCOHOL HOW MANY DRINKS DO YOU HAVE: 0
CONSUMPTION TOTAL: NEGATIVE
EVER HAD A DRINK FIRST THING IN THE MORNING TO STEADY YOUR NERVES TO GET RID OF A HANGOVER: NO
DOES PATIENT WANT TO STOP DRINKING: NO
TOTAL SCORE: 0
TOTAL SCORE: 0
AVERAGE NUMBER OF DAYS PER WEEK YOU HAVE A DRINK CONTAINING ALCOHOL: 0
HAVE PEOPLE ANNOYED YOU BY CRITICIZING YOUR DRINKING: NO

## 2023-03-09 ASSESSMENT — ENCOUNTER SYMPTOMS
BACK PAIN: 0
HEADACHES: 0
HEARTBURN: 0
ABDOMINAL PAIN: 1
PALPITATIONS: 0
SHORTNESS OF BREATH: 0
NECK PAIN: 0
BLOOD IN STOOL: 0
NAUSEA: 1
EYE DISCHARGE: 0
CHILLS: 0
WEAKNESS: 0
CONSTIPATION: 1
VOMITING: 0
FEVER: 0
EYE PAIN: 0
COUGH: 0

## 2023-03-09 ASSESSMENT — COGNITIVE AND FUNCTIONAL STATUS - GENERAL
TOILETING: A LITTLE
MOBILITY SCORE: 22
DAILY ACTIVITIY SCORE: 22
SUGGESTED CMS G CODE MODIFIER MOBILITY: CJ
CLIMB 3 TO 5 STEPS WITH RAILING: A LITTLE
SUGGESTED CMS G CODE MODIFIER DAILY ACTIVITY: CJ
WALKING IN HOSPITAL ROOM: A LITTLE
DRESSING REGULAR UPPER BODY CLOTHING: A LITTLE

## 2023-03-09 ASSESSMENT — PAIN DESCRIPTION - PAIN TYPE
TYPE: ACUTE PAIN

## 2023-03-09 NOTE — PROGRESS NOTES
Report received, assume care.   0946 Pt c/o nausea, PRN Zrofran. Pt c/o 5/10 Head ache, Text out to Dr Romero for APAP. Fluids running, Pt AOx4 able to make needs known. Encourage rest.     Orthostatics:    Lying  126/83-76  Sitting 144/91-79   Standing 152/91- 81

## 2023-03-09 NOTE — PROGRESS NOTES
4 Eyes Skin Assessment Completed by SHANTHI Mckeon and SHANTHI Grey.    Head WDL  Ears WDL  Nose WDL  Mouth WDL  Neck WDL  Breast/Chest WDL  Shoulder Blades WDL  Spine WDL  (R) Arm/Elbow/Hand WDL  (L) Arm/Elbow/Hand WDL  Abdomen WDL  Groin WDL  Scrotum/Coccyx/Buttocks WDL  (R) Leg WDL  (L) Leg WDL  (R) Heel/Foot/Toe WDL scab to posterior ankle   (L) Heel/Foot/Toe WDL          Devices In Places Blood Pressure Cuff      Interventions In Place Heels Loaded W/Pillows    Possible Skin Injury No    Pictures Uploaded Into Epic N/A  Wound Consult Placed N/A  RN Wound Prevention Protocol Ordered No

## 2023-03-09 NOTE — CARE PLAN
The patient is Stable - Low risk of patient condition declining or worsening    Shift Goals  Clinical Goals: pain control, IVF, safety  Patient Goals: pain control    Progress made toward(s) clinical / shift goals:  PRN morphine for abd pain, able to make her needs known.     Patient is not progressing towards the following goals: N/A    Problem: Pain - Standard  Goal: Alleviation of pain or a reduction in pain to the patient’s comfort goal  Outcome: Progressing     Problem: Knowledge Deficit - Standard  Goal: Patient and family/care givers will demonstrate understanding of plan of care, disease process/condition, diagnostic tests and medications  Outcome: Progressing

## 2023-03-09 NOTE — HOSPITAL COURSE
64 y/o woman with hx of gastric sleeve, cholecystectomy, and SBO who presented 3/8/23 with abdominal pain and admitted for sigmoid diverticulitis. CT A/P stating that perisigmoid inflammatory fat stranding is severe. Placed on bowel rest. Started on IV ceftriaxone and flagyl but switched to PO (3/9 - 3/19). On IVF and advanced diet to soft diet on 3/10.

## 2023-03-09 NOTE — CARE PLAN
Problem: Pain - Standard  Goal: Alleviation of pain or a reduction in pain to the patient’s comfort goal  Outcome: Progressing     Problem: Knowledge Deficit - Standard  Goal: Patient and family/care givers will demonstrate understanding of plan of care, disease process/condition, diagnostic tests and medications  Outcome: Progressing   The patient is Stable - Low risk of patient condition declining or worsening    Shift Goals  Clinical Goals: pain control, IVF, safety  Patient Goals: pain control    Progress made toward(s) clinical / shift goals:  cont ole nausea and pain management, pt remains NPO sips with medication.      Patient is not progressing towards the following goals:

## 2023-03-09 NOTE — ASSESSMENT & PLAN NOTE
Mariposa by provider on admit, heard on my exam 3/10 as well  ECHO 04/2022 EF 60%, normal diastolic function. LV normal size/thickness and function. Mildly dilated LA. Trace MV regurgitation. Mild TR. RASP 22mmHg.  EKG NSR with HR 81

## 2023-03-09 NOTE — ASSESSMENT & PLAN NOTE
Uncomplicated diverticulitis. No abscess or free air on CT  Continue ceftriaxone and flagyl (3/9 - 3/19)  Continue IVF at 150ml/hr  Advance diet as tolerated  IV antiemetics and pain control  F/U 6-8 weeks repeat colonoscopy, tho less necessary in setting of having colonoscopy 1 year ago (3 benign polyps removed)

## 2023-03-09 NOTE — ED TRIAGE NOTES
"Chief Complaint   Patient presents with    Abdominal Pain     Center of abdomen starting last night, pt states pain started in R lower back / flank but is now only present in abdomen, LBM yesterday, denies signs of bleeding, pt endorses vomiting today, hx gastric bypass, cholecystectomy, and bowel obstruction per pt     Vomiting     Starting today, denies blood in vomit     Pt to triage in  for above complaints, VSS on RA, gCS 15, NAD.    Pt returned to lobby. Educated on triage process and to inform staff of any changes.     BP (!) 151/91   Pulse (!) 101   Temp 36.8 °C (98.2 °F) (Temporal)   Resp 18   Ht 1.6 m (5' 3\")   Wt 90.7 kg (200 lb)   LMP  (LMP Unknown)   SpO2 97%   BMI 35.43 kg/m²     "

## 2023-03-09 NOTE — ED NOTES
Report given to the assigned nurse in [id number], provided opportunity to ask questions  Transport came to get the pt, all pt belongings were sent.

## 2023-03-09 NOTE — ASSESSMENT & PLAN NOTE
RESOLVED  Per patient, states that 2/2 to abdominal pain, she felt like she was going to pass out. Her  caught her fall  Does not think that she passed out  Likely secondary to orthostatics d/t dehydration from N/V, decreased PO intake vs secondary to vasovagal from severe 10/10 abdominal pain  04/2022 ECHO showing EF 60% with trace MV regurgitation, mild TR - no structural or significant valvular abnormalities; no history of arrhthymias. EKG showing NSR. Unlikely due to cardiac reason  Review of med list, none likely to have induced presyncope  Pt states no further episodes of presyncope or dizziness and has been walking down the halls during this admission

## 2023-03-09 NOTE — DIETARY
NUTRITION SERVICES: BMI - Pt with BMI >40 (=Body mass index is 40.81 kg/m².), Class III obesity. Weight loss counseling not appropriate in acute care setting.     RECOMMEND - If appropriate at DC please refer to outpatient nutrition services for weight management.     RD to monitor per department policy.

## 2023-03-09 NOTE — ED PROVIDER NOTES
ED Provider Note    CHIEF COMPLAINT  Chief Complaint   Patient presents with    Abdominal Pain     Center of abdomen starting last night, pt states pain started in R lower back / flank but is now only present in abdomen, LBM yesterday, denies signs of bleeding, pt endorses vomiting today, hx gastric bypass, cholecystectomy, and bowel obstruction per pt     Vomiting     Starting today, denies blood in vomit       EXTERNAL RECORDS REVIEWED  Inpatient Notes      HPI/ROS  LIMITATION TO HISTORY   Select: : None  OUTSIDE HISTORIAN(S):  Family      Fatoumata Redding is a 63 y.o. female who presents here for evaluation of abdominal pain.  Patient states that her abdominal pain is in the center of the abdomen, and is nonradiating.  She states that she has a history of bowel obstruction, and thinks that it very well could be the same.  She states that the pain is very similar, and feels the same.  She has no radiation of pain to the back, no chest pain or shortness of breath.  She has no headache.  She takes no anticoagulants.  Patient states that she has been vomiting all day.  She denies any headache or leg pain.    PAST MEDICAL HISTORY   has a past medical history of Bilateral carotid artery stenosis (04/21/2022), BRCA gene mutation negative (07/13/2022), Carotid atherosclerosis, bilateral (04/21/2022), Hashimoto's thyroiditis (04/21/2022), Hyperlipidemia, Hypertension, Pain (12/13/2022), palpitations, PONV (postoperative nausea and vomiting), Snoring, Thyroid disease, and TIA (transient ischemic attack).    SURGICAL HISTORY   has a past surgical history that includes abdominal exploration (2013); abdominal exploration (2011); cholecystectomy; primary c section; cervical disk and fusion anterior (2004); and meniscectomy, knee, arthroscopic (Right, 12/23/2022).    FAMILY HISTORY  Family History   Problem Relation Age of Onset    Cancer Mother 79        ovarian CA  pt is BRCA 1 and 2 negative    Cancer Father 74        lung ca;  "smoker    Stroke Father 70        passed at 74    Cancer Sister 55        Brca -metastatic    Cancer Sister         melanoma face, in remission    Diabetes Sister     Diabetes Maternal Grandmother     Cancer Paternal Grandfather         lung Ca smoker    Heart Disease Neg Hx        SOCIAL HISTORY  Social History     Tobacco Use    Smoking status: Never    Smokeless tobacco: Never   Vaping Use    Vaping Use: Never used   Substance and Sexual Activity    Alcohol use: Yes     Comment: 1 glass wine/week    Drug use: Not Currently    Sexual activity: Yes     Partners: Male     Comment:        CURRENT MEDICATIONS  Home Medications       Reviewed by Elvin Cruz R.N. (Registered Nurse) on 03/08/23 at 1750  Med List Status: Not Addressed     Medication Last Dose Status   aspirin EC (ECOTRIN) 81 MG Tablet Delayed Response  Active   levothyroxine (SYNTHROID) 175 MCG Tab  Active   losartan (COZAAR) 50 MG Tab  Active   pantoprazole (PROTONIX) 20 MG tablet  Active   rosuvastatin (CRESTOR) 10 MG Tab  Active                    ALLERGIES  Allergies   Allergen Reactions    Prednisone Hives and Rash     \"hives and rash\"    Promethazine Rash     Possible rash, not confirmed    Tape Rash     Paper tape,other tapes give her rash    Latex Rash              PHYSICAL EXAM  VITAL SIGNS: BP (!) 151/91   Pulse (!) 101   Temp 36.8 °C (98.2 °F) (Temporal)   Resp 18   Ht 1.6 m (5' 3\")   Wt 90.7 kg (200 lb)   LMP  (LMP Unknown)   SpO2 97%   BMI 35.43 kg/m²    Constitutional: Well developed, well nourished. No acute distress.  HEENT: Normocephalic, atraumatic. Posterior pharynx clear and moist.  Eyes:  EOMI. Normal sclera.  Neck: Supple, Full range of motion, nontender.  Chest/Pulmonary: clear to ausculation. Symmetrical expansion.   Cardio: Regular rate and rhythm with no murmur.   Abdomen: Soft, nontender. No peritoneal signs. No guarding. No palpable masses.  Back: No CVA tenderness, nontender midline, no step " offs.  Musculoskeletal: No deformity, no edema, neurovascular intact.   Neuro: Clear speech, appropriate, cooperative, cranial nerves II-XII grossly intact.  Psych: Normal mood and affect      DIAGNOSTIC STUDIES / PROCEDURES  Results for orders placed or performed during the hospital encounter of 03/08/23   CBC with Differential   Result Value Ref Range    WBC 15.6 (H) 4.8 - 10.8 K/uL    RBC 5.06 4.20 - 5.40 M/uL    Hemoglobin 14.6 12.0 - 16.0 g/dL    Hematocrit 45.4 37.0 - 47.0 %    MCV 89.7 81.4 - 97.8 fL    MCH 28.9 27.0 - 33.0 pg    MCHC 32.2 (L) 33.6 - 35.0 g/dL    RDW 42.6 35.9 - 50.0 fL    Platelet Count 276 164 - 446 K/uL    MPV 9.7 9.0 - 12.9 fL    Neutrophils-Polys 83.50 (H) 44.00 - 72.00 %    Lymphocytes 6.90 (L) 22.00 - 41.00 %    Monocytes 8.80 0.00 - 13.40 %    Eosinophils 0.10 0.00 - 6.90 %    Basophils 0.30 0.00 - 1.80 %    Immature Granulocytes 0.40 0.00 - 0.90 %    Nucleated RBC 0.00 /100 WBC    Neutrophils (Absolute) 13.04 (H) 2.00 - 7.15 K/uL    Lymphs (Absolute) 1.07 1.00 - 4.80 K/uL    Monos (Absolute) 1.37 (H) 0.00 - 0.85 K/uL    Eos (Absolute) 0.01 0.00 - 0.51 K/uL    Baso (Absolute) 0.04 0.00 - 0.12 K/uL    Immature Granulocytes (abs) 0.06 0.00 - 0.11 K/uL    NRBC (Absolute) 0.00 K/uL   Complete Metabolic Panel   Result Value Ref Range    Sodium 139 135 - 145 mmol/L    Potassium 4.0 3.6 - 5.5 mmol/L    Chloride 102 96 - 112 mmol/L    Co2 25 20 - 33 mmol/L    Anion Gap 12.0 7.0 - 16.0    Glucose 146 (H) 65 - 99 mg/dL    Bun 6 (L) 8 - 22 mg/dL    Creatinine 0.65 0.50 - 1.40 mg/dL    Calcium 9.4 8.5 - 10.5 mg/dL    AST(SGOT) 12 12 - 45 U/L    ALT(SGPT) 12 2 - 50 U/L    Alkaline Phosphatase 76 30 - 99 U/L    Total Bilirubin 0.6 0.1 - 1.5 mg/dL    Albumin 4.3 3.2 - 4.9 g/dL    Total Protein 7.8 6.0 - 8.2 g/dL    Globulin 3.5 1.9 - 3.5 g/dL    A-G Ratio 1.2 g/dL   Lipase   Result Value Ref Range    Lipase 10 (L) 11 - 82 U/L   CORRECTED CALCIUM   Result Value Ref Range    Correct Calcium 9.2 8.5 -  10.5 mg/dL   ESTIMATED GFR   Result Value Ref Range    GFR (CKD-EPI) 100 >60 mL/min/1.73 m 2   LACTIC ACID   Result Value Ref Range    Lactic Acid 1.1 0.5 - 2.0 mmol/L         RADIOLOGY  I have independently interpreted the diagnostic imaging associated with this visit and am waiting the final reading from the radiologist.   My preliminary interpretation is as follows: see below  Radiologist interpretation:   CT-ABDOMEN-PELVIS WITH   Final Result      Acute sigmoid diverticulitis without free air or abscess      Vertical sleeve gastrectomy and small sliding hernia      Cholecystectomy      Hepatic hypodensities are without gross change and may represent cysts and/or hemangiomas            COURSE & MEDICAL DECISION MAKING        INITIAL ASSESSMENT, COURSE AND PLAN  Care Narrative: This is a 63-year-old female who came in for abdominal pain and vomiting.  It was noted on her lab work that he had a white blood cell count of 15, some persistent vomiting, and a CT scan showing diverticulitis.  She will be treated with IV antibiotics, and brought into the hospital for further evaluation and treatment.        FINAL DIAGNOSIS  1. Diverticulitis           Electronically signed by: Mack Brasher D.O., 3/8/2023 7:09 PM

## 2023-03-09 NOTE — H&P
[facility] Internal Medicine History & Physical Note    Date of Service  3/8/2023    [facility] Team: [facility]   Attending: Ryan Velasquez M.d.  Senior Resident: Dr. Rosas  Contact Number: [phone]    Primary Care Physician  Messi Patel M.D.    Consultants  N/a    Specialist Names: n/a    Code Status  Full Code    Chief Complaint  Chief Complaint   Patient presents with    Abdominal Pain     Center of abdomen starting last night, pt states pain started in R lower back / flank but is now only present in abdomen, LBM yesterday, denies signs of bleeding, pt endorses vomiting today, hx gastric bypass, cholecystectomy, and bowel obstruction per pt     Vomiting     Starting today, denies blood in vomit       History of Presenting Illness (HPI):  Fatoumata Redding is a 63 y.o. female who presented 3/8/2023 with 1 day of abdominal pain, nausea, vomiting, and chills. She was in her usual state of health but has had progressively ramping abdominal pain worse in the center of her abdomen since. She is unable to drink/eat without vomiting and has significant nausea. She is passing gas but no bowel movements recently. Pain is currently 4/10 but much worse with movement of her body. Nothing really seems to help. She reports she had colonoscopy 1 year ago with 3 benign polyps removed. Hx of gastric sleeve and     ED vitals reviewed initially tachycardic with leukocytosis however now normal heart rate prior to intervention, CT scan reviewed no abscess or free air, lactic acid wnl    I discussed the plan of care with patient and family.    Review of Systems  Review of Systems   Constitutional:  Positive for chills. Negative for fever, malaise/fatigue and weight loss.   HENT:  Negative for congestion and hearing loss.    Eyes:  Negative for blurred vision and double vision.   Respiratory:  Negative for cough and shortness of breath.    Cardiovascular:  Negative for chest pain, palpitations and leg swelling.   Gastrointestinal:   "Positive for abdominal pain, nausea and vomiting. Negative for blood in stool, constipation, diarrhea, heartburn and melena.   Genitourinary:  Negative for dysuria, frequency and urgency.   Musculoskeletal:  Negative for back pain, myalgias and neck pain.   Neurological:  Positive for weakness. Negative for dizziness and headaches.   All other systems reviewed and are negative.    Past Medical History   has a past medical history of Bilateral carotid artery stenosis (04/21/2022), BRCA gene mutation negative (07/13/2022), Carotid atherosclerosis, bilateral (04/21/2022), Hashimoto's thyroiditis (04/21/2022), Hyperlipidemia, Hypertension, Pain (12/13/2022), palpitations, PONV (postoperative nausea and vomiting), Snoring, Thyroid disease, and TIA (transient ischemic attack).    Surgical History   has a past surgical history that includes abdominal exploration (2013); abdominal exploration (2011); cholecystectomy; primary c section; cervical disk and fusion anterior (2004); and meniscectomy, knee, arthroscopic (Right, 12/23/2022).     Family History  family history includes Cancer in her paternal grandfather and sister; Cancer (age of onset: 55) in her sister; Cancer (age of onset: 74) in her father; Cancer (age of onset: 79) in her mother; Diabetes in her maternal grandmother and sister; Stroke (age of onset: 70) in her father.   Family history reviewed with patient.     Social History  Tobacco: denies  Alcohol: socially, very infrequently  Recreational drugs (illegal or prescription): denies  Employment: retired, previously   Living Situation: lives with   Recent Travel: denies  Primary Care Provider: Reviewed    Other (stressors, spirituality, exposures): n/a    Allergies  Allergies   Allergen Reactions    Prednisone Hives and Rash     \"hives and rash\"    Promethazine Rash     Possible rash, not confirmed    Tape Rash     Paper tape,other tapes give her rash    Latex Rash        "       Medications  Prior to Admission Medications   Prescriptions Last Dose Informant Patient Reported? Taking?   aspirin EC (ECOTRIN) 81 MG Tablet Delayed Response  Patient Yes No   Sig: Take 1 Tablet by mouth every day.   levothyroxine (SYNTHROID) 175 MCG Tab   No No   Sig: Take 1 Tablet by mouth every morning on an empty stomach.   losartan (COZAAR) 50 MG Tab   No No   Sig: Take 1 Tablet by mouth every day.   pantoprazole (PROTONIX) 20 MG tablet   No No   Sig: Take 1 Tablet by mouth every day.   rosuvastatin (CRESTOR) 10 MG Tab   No No   Sig: TAKE 1 TABLET BY MOUTH EVERY DAY      Facility-Administered Medications: None       Physical Exam  Temp:  [36.8 °C (98.2 °F)-36.9 °C (98.4 °F)] 36.9 °C (98.4 °F)  Pulse:  [] 82  Resp:  [18-19] 19  BP: (138-151)/(70-91) 142/80  SpO2:  [93 %-97 %] 93 %  Blood Pressure: (!) 142/80   Temperature: 36.9 °C (98.4 °F)   Pulse: 82   Respiration: 19   Pulse Oximetry: 93 %       Physical Exam  Constitutional:       General: She is not in acute distress.     Appearance: She is not ill-appearing.      Comments: Tired appearing   HENT:      Head: Normocephalic and atraumatic.      Right Ear: External ear normal.      Left Ear: External ear normal.      Nose: Nose normal. No congestion.      Mouth/Throat:      Mouth: Mucous membranes are dry.      Pharynx: Oropharynx is clear.   Eyes:      General: No scleral icterus.     Extraocular Movements: Extraocular movements intact.      Pupils: Pupils are equal, round, and reactive to light.   Cardiovascular:      Rate and Rhythm: Normal rate.      Heart sounds: Murmur (2/6 systolic murmur, patient denies ever having murmur) heard.   Abdominal:      General: Abdomen is flat. There is no distension.      Palpations: Abdomen is soft.      Tenderness: There is abdominal tenderness. There is guarding.   Musculoskeletal:      Right lower leg: No edema.      Left lower leg: No edema.   Skin:     General: Skin is warm and dry.      Capillary  Refill: Capillary refill takes less than 2 seconds.   Neurological:      General: No focal deficit present.      Mental Status: She is alert and oriented to person, place, and time.      Cranial Nerves: No cranial nerve deficit.       Laboratory:  Recent Labs     03/07/23  0705 03/08/23  1801   WBC 8.0 15.6*   RBC 4.94 5.06   HEMOGLOBIN 14.5 14.6   HEMATOCRIT 43.9 45.4   MCV 88.9 89.7   MCH 29.4 28.9   MCHC 33.0* 32.2*   RDW 43.1 42.6   PLATELETCT 249 276   MPV 10.8 9.7     Recent Labs     03/07/23  0705 03/08/23  1801   SODIUM 142 139   POTASSIUM 4.6 4.0   CHLORIDE 107 102   CO2 26 25   GLUCOSE 83 146*   BUN 7* 6*   CREATININE 0.67 0.65   CALCIUM 9.3 9.4     Recent Labs     03/07/23  0705 03/08/23  1801   ALTSGPT 13 12   ASTSGOT 17 12   ALKPHOSPHAT 67 76   TBILIRUBIN 0.3 0.6   LIPASE  --  10*   GLUCOSE 83 146*         No results for input(s): NTPROBNP in the last 72 hours.  Recent Labs     03/07/23  0705   TRIGLYCERIDE 68   HDL 62   LDL 65     No results for input(s): TROPONINT in the last 72 hours.    Imaging:  CT-ABDOMEN-PELVIS WITH   Final Result      Acute sigmoid diverticulitis without free air or abscess      Vertical sleeve gastrectomy and small sliding hernia      Cholecystectomy      Hepatic hypodensities are without gross change and may represent cysts and/or hemangiomas          X-Ray:  I have personally reviewed the images and compared with prior images.    Assessment/Plan:  Problem Representation:     63 year-old female presents with 1 day of abdominal pain nausea vomiting, admitted for acute uncomplicated diverticulitis.     I anticipate this patient will require at least two midnights for appropriate medical management, necessitating inpatient admission because diverticulitis    Patient will need a Med/Surg bed on MEDICAL service .  The need is secondary to diverticulitis.    * Sigmoid diverticulitis- (present on admission)  Assessment & Plan  No abscess or free air  Outpatient colonoscopy after  discharge  Start ceftriaxone and flagyl  Clinically dry, will bolus and start maintenance IVF  NPO for bowel rest  IV antiemetics and pain control  Serial abd exams and trend leukocytosis    Heart murmur  Assessment & Plan  Denies knowing about having a murmur, not present on previous documentation  EKG ordered    History of TIA (transient ischemic attack)  Assessment & Plan  Continuing home aspirin and statin    Hypothyroid- (present on admission)  Assessment & Plan  Continue home synthroid    Essential hypertension- (present on admission)  Assessment & Plan  Continuing home losartan as blood pressure is adequate        VTE prophylaxis: enoxaparin ppx

## 2023-03-09 NOTE — ED NOTES
Report received from previous shift. Assumed patient care. Verified patient identification. Checked on bed, connected to monitor,  with unlabored respirations. Vital signs is stable. Denied any new complaints. Gurney in low position, side rail up for pt safety. Call light within reach. Will continue to monitor for any complications.

## 2023-03-10 LAB
ALBUMIN SERPL BCP-MCNC: 3 G/DL (ref 3.2–4.9)
ALBUMIN/GLOB SERPL: 1.1 G/DL
ALP SERPL-CCNC: 55 U/L (ref 30–99)
ALT SERPL-CCNC: 9 U/L (ref 2–50)
ANION GAP SERPL CALC-SCNC: 14 MMOL/L (ref 7–16)
AST SERPL-CCNC: 11 U/L (ref 12–45)
BASOPHILS # BLD AUTO: 0.7 % (ref 0–1.8)
BASOPHILS # BLD: 0.05 K/UL (ref 0–0.12)
BILIRUB SERPL-MCNC: 0.2 MG/DL (ref 0.1–1.5)
BUN SERPL-MCNC: 7 MG/DL (ref 8–22)
CALCIUM ALBUM COR SERPL-MCNC: 9.2 MG/DL (ref 8.5–10.5)
CALCIUM SERPL-MCNC: 8.4 MG/DL (ref 8.5–10.5)
CHLORIDE SERPL-SCNC: 102 MMOL/L (ref 96–112)
CO2 SERPL-SCNC: 21 MMOL/L (ref 20–33)
CREAT SERPL-MCNC: 0.52 MG/DL (ref 0.5–1.4)
EOSINOPHIL # BLD AUTO: 0.06 K/UL (ref 0–0.51)
EOSINOPHIL NFR BLD: 0.8 % (ref 0–6.9)
ERYTHROCYTE [DISTWIDTH] IN BLOOD BY AUTOMATED COUNT: 40 FL (ref 35.9–50)
GFR SERPLBLD CREATININE-BSD FMLA CKD-EPI: 106 ML/MIN/1.73 M 2
GLOBULIN SER CALC-MCNC: 2.8 G/DL (ref 1.9–3.5)
GLUCOSE SERPL-MCNC: 85 MG/DL (ref 65–99)
HCT VFR BLD AUTO: 36.9 % (ref 37–47)
HGB BLD-MCNC: 12.3 G/DL (ref 12–16)
IMM GRANULOCYTES # BLD AUTO: 0.03 K/UL (ref 0–0.11)
IMM GRANULOCYTES NFR BLD AUTO: 0.4 % (ref 0–0.9)
LYMPHOCYTES # BLD AUTO: 1.06 K/UL (ref 1–4.8)
LYMPHOCYTES NFR BLD: 14.4 % (ref 22–41)
MAGNESIUM SERPL-MCNC: 1.8 MG/DL (ref 1.5–2.5)
MCH RBC QN AUTO: 29 PG (ref 27–33)
MCHC RBC AUTO-ENTMCNC: 33.3 G/DL (ref 33.6–35)
MCV RBC AUTO: 87 FL (ref 81.4–97.8)
MONOCYTES # BLD AUTO: 0.53 K/UL (ref 0–0.85)
MONOCYTES NFR BLD AUTO: 7.2 % (ref 0–13.4)
NEUTROPHILS # BLD AUTO: 5.62 K/UL (ref 2–7.15)
NEUTROPHILS NFR BLD: 76.5 % (ref 44–72)
NRBC # BLD AUTO: 0 K/UL
NRBC BLD-RTO: 0 /100 WBC
PLATELET # BLD AUTO: 218 K/UL (ref 164–446)
PMV BLD AUTO: 9.9 FL (ref 9–12.9)
POTASSIUM SERPL-SCNC: 3.8 MMOL/L (ref 3.6–5.5)
PROT SERPL-MCNC: 5.8 G/DL (ref 6–8.2)
RBC # BLD AUTO: 4.24 M/UL (ref 4.2–5.4)
SODIUM SERPL-SCNC: 137 MMOL/L (ref 135–145)
WBC # BLD AUTO: 7.4 K/UL (ref 4.8–10.8)

## 2023-03-10 PROCEDURE — 700102 HCHG RX REV CODE 250 W/ 637 OVERRIDE(OP)

## 2023-03-10 PROCEDURE — 700102 HCHG RX REV CODE 250 W/ 637 OVERRIDE(OP): Performed by: STUDENT IN AN ORGANIZED HEALTH CARE EDUCATION/TRAINING PROGRAM

## 2023-03-10 PROCEDURE — 700111 HCHG RX REV CODE 636 W/ 250 OVERRIDE (IP): Performed by: STUDENT IN AN ORGANIZED HEALTH CARE EDUCATION/TRAINING PROGRAM

## 2023-03-10 PROCEDURE — 83735 ASSAY OF MAGNESIUM: CPT

## 2023-03-10 PROCEDURE — A9270 NON-COVERED ITEM OR SERVICE: HCPCS | Performed by: STUDENT IN AN ORGANIZED HEALTH CARE EDUCATION/TRAINING PROGRAM

## 2023-03-10 PROCEDURE — 770006 HCHG ROOM/CARE - MED/SURG/GYN SEMI*

## 2023-03-10 PROCEDURE — 99232 SBSQ HOSP IP/OBS MODERATE 35: CPT | Mod: GC | Performed by: HOSPITALIST

## 2023-03-10 PROCEDURE — 85025 COMPLETE CBC W/AUTO DIFF WBC: CPT

## 2023-03-10 PROCEDURE — A9270 NON-COVERED ITEM OR SERVICE: HCPCS

## 2023-03-10 PROCEDURE — 700101 HCHG RX REV CODE 250: Performed by: STUDENT IN AN ORGANIZED HEALTH CARE EDUCATION/TRAINING PROGRAM

## 2023-03-10 PROCEDURE — 80053 COMPREHEN METABOLIC PANEL: CPT

## 2023-03-10 PROCEDURE — 700105 HCHG RX REV CODE 258: Performed by: STUDENT IN AN ORGANIZED HEALTH CARE EDUCATION/TRAINING PROGRAM

## 2023-03-10 RX ORDER — CEFDINIR 300 MG/1
300 CAPSULE ORAL EVERY 12 HOURS
Status: DISCONTINUED | OUTPATIENT
Start: 2023-03-10 | End: 2023-03-11 | Stop reason: HOSPADM

## 2023-03-10 RX ORDER — METRONIDAZOLE 500 MG/1
500 TABLET ORAL EVERY 8 HOURS
Status: DISCONTINUED | OUTPATIENT
Start: 2023-03-10 | End: 2023-03-11 | Stop reason: HOSPADM

## 2023-03-10 RX ADMIN — CEFTRIAXONE SODIUM 2000 MG: 10 INJECTION, POWDER, FOR SOLUTION INTRAVENOUS at 04:40

## 2023-03-10 RX ADMIN — OMEPRAZOLE 20 MG: 20 CAPSULE, DELAYED RELEASE ORAL at 04:40

## 2023-03-10 RX ADMIN — ASPIRIN 81 MG: 81 TABLET, COATED ORAL at 04:40

## 2023-03-10 RX ADMIN — METRONIDAZOLE 500 MG: 5 INJECTION, SOLUTION INTRAVENOUS at 04:44

## 2023-03-10 RX ADMIN — ACETAMINOPHEN 1000 MG: 500 TABLET, FILM COATED ORAL at 04:40

## 2023-03-10 RX ADMIN — METRONIDAZOLE 500 MG: 500 TABLET ORAL at 15:11

## 2023-03-10 RX ADMIN — CEFDINIR 300 MG: 300 CAPSULE ORAL at 17:41

## 2023-03-10 RX ADMIN — ROSUVASTATIN CALCIUM 10 MG: 20 TABLET, FILM COATED ORAL at 17:41

## 2023-03-10 RX ADMIN — SODIUM CHLORIDE, POTASSIUM CHLORIDE, SODIUM LACTATE AND CALCIUM CHLORIDE: 600; 310; 30; 20 INJECTION, SOLUTION INTRAVENOUS at 04:44

## 2023-03-10 RX ADMIN — ACETAMINOPHEN 1000 MG: 500 TABLET, FILM COATED ORAL at 21:05

## 2023-03-10 RX ADMIN — LOSARTAN POTASSIUM 50 MG: 50 TABLET, FILM COATED ORAL at 04:40

## 2023-03-10 RX ADMIN — METRONIDAZOLE 500 MG: 500 TABLET ORAL at 21:01

## 2023-03-10 RX ADMIN — LEVOTHYROXINE SODIUM 175 MCG: 0.1 TABLET ORAL at 04:40

## 2023-03-10 RX ADMIN — ACETAMINOPHEN 1000 MG: 500 TABLET, FILM COATED ORAL at 17:46

## 2023-03-10 ASSESSMENT — ENCOUNTER SYMPTOMS
NECK PAIN: 0
SEIZURES: 0
CHILLS: 0
EYE DISCHARGE: 0
BACK PAIN: 0
WEAKNESS: 0
HEADACHES: 1
CONSTIPATION: 0
HEARTBURN: 0
COUGH: 0
SHORTNESS OF BREATH: 0
EYE PAIN: 0
FEVER: 0
BLOOD IN STOOL: 0
PALPITATIONS: 0
ABDOMINAL PAIN: 0
VOMITING: 0
NAUSEA: 0

## 2023-03-10 NOTE — PROGRESS NOTES
Daily Progress Note:     Date of Service: 3/9/2023  Primary Team: [facility]   Attending: Candice Brito M.D.   Senior Resident: Dr. Camargo  Intern: Dr. Macias  Contact:  [phone]    Hospital Course:   62 yo F with PMHx VSG, cholecystectomy, SBO who presented 3/8/23 with abdominal pain and admitted for sigmoid diverticulitis. CT A/P stating that perisigmoid inflammatory fat stranding is severe. Placed on bowel rest. Started on IV ceftriaxone and flagyl (3/9 - ). Will advance diet as tolerated.    Interval Update:  -Diffuse abdominal pain improved from 10 to 2/10  -Reports nausea. No vomiting. States hasn't passed gas since 2pm yesterday.   -Reports that yesterday, she passed out from the pain. She states that she was vomiting everything up yesterday. Then she felt like she was going to pass out and then her  caught her fall. Does believe she had LOC for a few seconds but isn't positive.     Consultants/Specialty:  None    Review of Systems:  Review of Systems   Constitutional:  Negative for chills and fever.   HENT:  Negative for ear discharge and ear pain.    Eyes:  Negative for pain and discharge.   Respiratory:  Negative for cough and shortness of breath.    Cardiovascular:  Negative for chest pain and palpitations.   Gastrointestinal:  Positive for abdominal pain (2/10), constipation and nausea. Negative for blood in stool, heartburn, melena and vomiting.   Genitourinary:  Negative for dysuria and urgency.   Musculoskeletal:  Negative for back pain and neck pain.   Neurological:  Negative for weakness and headaches.     Objective:   Vitals:   Temp:  [36.2 °C (97.2 °F)-36.9 °C (98.4 °F)] 36.2 °C (97.2 °F)  Pulse:  [] 69  Resp:  [18-20] 18  BP: (123-151)/(68-91) 125/68  SpO2:  [93 %-97 %] 94 %    Physical Exam  Constitutional:       General: She is not in acute distress.     Appearance: She is not ill-appearing, toxic-appearing or diaphoretic.   HENT:      Head: Normocephalic and  atraumatic.      Mouth/Throat:      Mouth: Mucous membranes are moist.      Pharynx: Oropharynx is clear.   Eyes:      Extraocular Movements: Extraocular movements intact.      Conjunctiva/sclera: Conjunctivae normal.   Cardiovascular:      Rate and Rhythm: Normal rate and regular rhythm.      Heart sounds: No murmur heard.  Pulmonary:      Effort: Pulmonary effort is normal. No respiratory distress.      Breath sounds: No wheezing.   Abdominal:      General: There is no distension.      Palpations: Abdomen is soft.      Tenderness: There is abdominal tenderness (diffusely, worst at periumbilical). There is no guarding or rebound.   Musculoskeletal:      Cervical back: Normal range of motion and neck supple.      Right lower leg: No edema.      Left lower leg: No edema.   Skin:     General: Skin is warm and dry.   Neurological:      General: No focal deficit present.      Mental Status: She is alert and oriented to person, place, and time.   Psychiatric:         Mood and Affect: Mood normal.         Behavior: Behavior normal.     Labs:   Lab Results   Component Value Date/Time    WBC 10.4 03/09/2023 04:47 AM    RBC 4.34 03/09/2023 04:47 AM    HEMOGLOBIN 12.6 03/09/2023 04:47 AM    HEMATOCRIT 38.0 03/09/2023 04:47 AM    MCV 87.6 03/09/2023 04:47 AM    MCH 29.0 03/09/2023 04:47 AM    MCHC 33.2 (L) 03/09/2023 04:47 AM    MPV 10.5 03/09/2023 04:47 AM    NEUTSPOLYS 75.10 (H) 03/09/2023 04:47 AM    LYMPHOCYTES 15.70 (L) 03/09/2023 04:47 AM    MONOCYTES 7.60 03/09/2023 04:47 AM    EOSINOPHILS 0.80 03/09/2023 04:47 AM    BASOPHILS 0.50 03/09/2023 04:47 AM      Lab Results   Component Value Date/Time    SODIUM 138 03/09/2023 04:47 AM    POTASSIUM 3.7 03/09/2023 04:47 AM    CHLORIDE 104 03/09/2023 04:47 AM    CO2 26 03/09/2023 04:47 AM    GLUCOSE 88 03/09/2023 04:47 AM    BUN 6 (L) 03/09/2023 04:47 AM    CREATININE 0.53 03/09/2023 04:47 AM      No results found for: BJIQX64W, YYHMRY346A, OJDNT033O, ARTHCO3, ARTBE, GAPBG,  SRH6GPS3    Lab Results   Component Value Date/Time    PROTHROMBTM 12.4 03/24/2022 10:54 AM    INR 1.00 03/24/2022 10:54 AM      Imaging:   CT-ABDOMEN-PELVIS WITH   Final Result      Acute sigmoid diverticulitis without free air or abscess      Vertical sleeve gastrectomy and small sliding hernia      Cholecystectomy      Hepatic hypodensities are without gross change and may represent cysts and/or hemangiomas        Assessment and Plan:  * Sigmoid diverticulitis- (present on admission)  Assessment & Plan  Uncomplicated diverticulitis. No abscess or free air on CT  Started ceftriaxone and flagyl (3/9 - )  NPO for bowel rest. mIVF at 150ml/hr  IV antiemetics and pain control  Serial abd exams and trend leukocytosis  F/U 6-8 weeks repeat colonoscopy, tho less necessary in setting of having colonoscopy 1 year ago (3 benign polyps removed)    Postural dizziness with presyncope- (present on admission)  Assessment & Plan  Per patient, states that 2/2 to abdominal pain, she felt like she was going to pass out. Her  caught her fall  Does not think that she passed out  Likely secondary to orthostatics d/t dehydration from N/V, decreased PO intake vs secondary to vasovagal from severe 10/10 abdominal pain  04/2022 ECHO showing EF 60% with trace MV regurgitation, mild TR - no structural or significant valvular abnormalities; no history of arrhthymias. EKG showing NSR. Unlikely due to cardiac reason  Unlikely seizure based on history and presentation  Review of med list, none likely to have induced presyncope  Orthostatics pending    Heart murmur  Assessment & Plan  [place] by provider on admit.   ECHO 04/2022 EF 60%, normal diastolic function. LV normal size/thickness and function. Mildly dilated LA. Trace MV regurgitation. Mild TR. RASP 22mmHg.  EKG NSR 81 bpm    History of TIA (transient ischemic attack)  Assessment & Plan  Continuing home aspirin and statin    Hypothyroid- (present on admission)  Assessment &  Plan  Continue home synthroid    Essential hypertension- (present on admission)  Assessment & Plan  Continue home losartan    History of gastric surgery- (present on admission)  Assessment & Plan  Vertical sleeve gastrectomy     Code Status: FULL  DVT prophylaxis: enoxaparin  Diet: NPO  GI: omeprazole  Disposition: to remain inpatient    Kim Romero DO  PGY-1 Internal Medicine

## 2023-03-10 NOTE — CARE PLAN
The patient is Stable - Low risk of patient condition declining or worsening    Shift Goals  Clinical Goals: Comfort, IVF, safety  Patient Goals: rest    Progress made toward(s) clinical / shift goals:  Continues on IVF, denies abd pain, ind in room able to make her needs known.     Patient is not progressing towards the following goals: N/A    Problem: Pain - Standard  Goal: Alleviation of pain or a reduction in pain to the patient’s comfort goal  Outcome: Progressing     Problem: Knowledge Deficit - Standard  Goal: Patient and family/care givers will demonstrate understanding of plan of care, disease process/condition, diagnostic tests and medications  Outcome: Progressing

## 2023-03-10 NOTE — PROGRESS NOTES
Daily Progress Note:     Date of Service: 3/10/2023  Primary Team: [facility]   Attending: Candice Brito M.D.   Senior Resident: Dr. Camargo  Intern: Dr. Macias  Contact:  [phone]    Hospital Course:   62 y/o woman with hx of gastric sleeve, cholecystectomy, and SBO who presented 3/8/23 with abdominal pain and admitted for sigmoid diverticulitis. CT A/P stating that perisigmoid inflammatory fat stranding is severe. Placed on bowel rest. Started on IV ceftriaxone and flagyl (3/9 - ). On IVF and advancing diet with full liquid on 3/10.     Interval Update:  Pt states she no longer has abdominal pain and wants to try a diet. Tolerated full liquid this am, will try soft diet. States she has a headache and tylenol has not helped. Will monitor after diet.      Consultants/Specialty:  None    Review of Systems:  Review of Systems   Constitutional:  Negative for chills, fever and malaise/fatigue.   HENT:  Negative for ear discharge and ear pain.    Eyes:  Negative for pain and discharge.   Respiratory:  Negative for cough and shortness of breath.    Cardiovascular:  Negative for chest pain and palpitations.   Gastrointestinal:  Negative for abdominal pain, blood in stool, constipation, heartburn, melena, nausea and vomiting.   Genitourinary:  Negative for dysuria and urgency.   Musculoskeletal:  Negative for back pain and neck pain.   Neurological:  Positive for headaches. Negative for seizures and weakness.     Objective:   Vitals:   Temp:  [36.2 °C (97.2 °F)-37.7 °C (99.9 °F)] 36.2 °C (97.2 °F)  Pulse:  [69-81] 75  Resp:  [16-19] 19  BP: (125-152)/(68-91) 137/81  SpO2:  [94 %-100 %] 94 %    Physical Exam  Constitutional:       General: She is not in acute distress.     Appearance: She is not ill-appearing, toxic-appearing or diaphoretic.   HENT:      Head: Normocephalic and atraumatic.      Mouth/Throat:      Mouth: Mucous membranes are moist.      Pharynx: Oropharynx is clear.   Eyes:      General: No  scleral icterus.     Extraocular Movements: Extraocular movements intact.      Conjunctiva/sclera: Conjunctivae normal.   Cardiovascular:      Rate and Rhythm: Normal rate and regular rhythm.      Heart sounds: Murmur (2/6 systolic) heard.   Pulmonary:      Effort: Pulmonary effort is normal. No respiratory distress.      Breath sounds: No wheezing.   Abdominal:      General: There is no distension.      Palpations: Abdomen is soft.      Tenderness: There is no abdominal tenderness. There is no guarding or rebound.   Musculoskeletal:      Cervical back: Normal range of motion and neck supple.      Right lower leg: No edema.      Left lower leg: No edema.   Skin:     General: Skin is warm and dry.   Neurological:      General: No focal deficit present.      Mental Status: She is alert and oriented to person, place, and time.   Psychiatric:         Mood and Affect: Mood normal.         Behavior: Behavior normal.     Labs:   Lab Results   Component Value Date/Time    WBC 7.4 03/10/2023 05:24 AM    RBC 4.24 03/10/2023 05:24 AM    HEMOGLOBIN 12.3 03/10/2023 05:24 AM    HEMATOCRIT 36.9 (L) 03/10/2023 05:24 AM    MCV 87.0 03/10/2023 05:24 AM    MCH 29.0 03/10/2023 05:24 AM    MCHC 33.3 (L) 03/10/2023 05:24 AM    MPV 9.9 03/10/2023 05:24 AM    NEUTSPOLYS 76.50 (H) 03/10/2023 05:24 AM    LYMPHOCYTES 14.40 (L) 03/10/2023 05:24 AM    MONOCYTES 7.20 03/10/2023 05:24 AM    EOSINOPHILS 0.80 03/10/2023 05:24 AM    BASOPHILS 0.70 03/10/2023 05:24 AM      Lab Results   Component Value Date/Time    SODIUM 137 03/10/2023 05:24 AM    POTASSIUM 3.8 03/10/2023 05:24 AM    CHLORIDE 102 03/10/2023 05:24 AM    CO2 21 03/10/2023 05:24 AM    GLUCOSE 85 03/10/2023 05:24 AM    BUN 7 (L) 03/10/2023 05:24 AM    CREATININE 0.52 03/10/2023 05:24 AM      No results found for: XWEIT53G, EWCJYY085N, YFEQN030D, ARTHCO3, ARTBE, GAPBG, MSB4IDF2    Lab Results   Component Value Date/Time    PROTHROMBTM 12.4 03/24/2022 10:54 AM    INR 1.00 03/24/2022 10:54  AM      Imaging:   CT-ABDOMEN-PELVIS WITH   Final Result      Acute sigmoid diverticulitis without free air or abscess      Vertical sleeve gastrectomy and small sliding hernia      Cholecystectomy      Hepatic hypodensities are without gross change and may represent cysts and/or hemangiomas        Assessment and Plan:  * Sigmoid diverticulitis- (present on admission)  Assessment & Plan  Uncomplicated diverticulitis. No abscess or free air on CT  Continue ceftriaxone and flagyl (3/9 - 3/19)  Continue IVF at 150ml/hr  Advance diet as tolerated  IV antiemetics and pain control  F/U 6-8 weeks repeat colonoscopy, tho less necessary in setting of having colonoscopy 1 year ago (3 benign polyps removed)    Postural dizziness with presyncope- (present on admission)  Assessment & Plan  RESOLVED  Per patient, states that 2/2 to abdominal pain, she felt like she was going to pass out. Her  caught her fall  Does not think that she passed out  Likely secondary to orthostatics d/t dehydration from N/V, decreased PO intake vs secondary to vasovagal from severe 10/10 abdominal pain  04/2022 ECHO showing EF 60% with trace MV regurgitation, mild TR - no structural or significant valvular abnormalities; no history of arrhthymias. EKG showing NSR. Unlikely due to cardiac reason  Review of med list, none likely to have induced presyncope  Pt states no further episodes of presyncope or dizziness and has been walking down the halls during this admission    Heart murmur  Assessment & Plan  [place] by provider on admit, heard on my exam 3/10 as well  ECHO 04/2022 EF 60%, normal diastolic function. LV normal size/thickness and function. Mildly dilated LA. Trace MV regurgitation. Mild TR. RASP 22mmHg.  EKG NSR with HR 81    History of TIA (transient ischemic attack)  Assessment & Plan  Continuing home ASA 81mg and Rosuvastatin 10mg qd    History of gastric surgery- (present on admission)  Assessment & Plan  Vertical sleeve gastrectomy      Hypothyroid- (present on admission)  Assessment & Plan  Continue home Synthroid 175mcg qd    Essential hypertension- (present on admission)  Assessment & Plan  Continue home Losartan 50mg qd      VTE prophylaxis: enoxaparin ppx     I have performed a physical exam and reviewed and updated ROS and Plan today (3/10/2023). In review of yesterday's note (3/9/2023), there are no changes except as documented above.

## 2023-03-10 NOTE — CARE PLAN
Problem: Pain - Standard  Goal: Alleviation of pain or a reduction in pain to the patient’s comfort goal  3/10/2023 1545 by Chelle Crump R.N.  Outcome: Progressing  3/10/2023 1545 by Chelle Crump R.N.  Outcome: Progressing     Problem: Knowledge Deficit - Standard  Goal: Patient and family/care givers will demonstrate understanding of plan of care, disease process/condition, diagnostic tests and medications  3/10/2023 1545 by ARISTIDES Michael.N.  Outcome: Progressing  3/10/2023 1545 by Chelle Crump R.N.  Outcome: Progressing   The patient is Stable - Low risk of patient condition declining or worsening    Shift Goals  Clinical Goals: iv abx  Patient Goals: eat    Progress made toward(s) clinical / shift goals:  diet advanced-tolerating well, pain controlled, iv abx switched to po    Patient is not progressing towards the following goals:

## 2023-03-11 VITALS
OXYGEN SATURATION: 94 % | DIASTOLIC BLOOD PRESSURE: 69 MMHG | TEMPERATURE: 97.7 F | SYSTOLIC BLOOD PRESSURE: 148 MMHG | WEIGHT: 230.38 LBS | HEART RATE: 70 BPM | RESPIRATION RATE: 18 BRPM | HEIGHT: 63 IN | BODY MASS INDEX: 40.82 KG/M2

## 2023-03-11 PROCEDURE — 700102 HCHG RX REV CODE 250 W/ 637 OVERRIDE(OP): Performed by: STUDENT IN AN ORGANIZED HEALTH CARE EDUCATION/TRAINING PROGRAM

## 2023-03-11 PROCEDURE — A9270 NON-COVERED ITEM OR SERVICE: HCPCS | Performed by: STUDENT IN AN ORGANIZED HEALTH CARE EDUCATION/TRAINING PROGRAM

## 2023-03-11 PROCEDURE — 99238 HOSP IP/OBS DSCHRG MGMT 30/<: CPT | Mod: GC | Performed by: HOSPITALIST

## 2023-03-11 RX ORDER — ONDANSETRON 4 MG/1
4 TABLET, ORALLY DISINTEGRATING ORAL EVERY 6 HOURS PRN
Qty: 10 TABLET | Refills: 0 | Status: SHIPPED | OUTPATIENT
Start: 2023-03-11 | End: 2023-03-29

## 2023-03-11 RX ORDER — METRONIDAZOLE 500 MG/1
500 TABLET ORAL 3 TIMES DAILY
Qty: 24 TABLET | Refills: 0 | Status: ACTIVE | OUTPATIENT
Start: 2023-03-11 | End: 2023-03-19

## 2023-03-11 RX ORDER — CIPROFLOXACIN 500 MG/1
500 TABLET, FILM COATED ORAL 2 TIMES DAILY
Qty: 16 TABLET | Refills: 0 | Status: ACTIVE | OUTPATIENT
Start: 2023-03-11 | End: 2023-03-19

## 2023-03-11 RX ADMIN — ASPIRIN 81 MG: 81 TABLET, COATED ORAL at 04:13

## 2023-03-11 RX ADMIN — CEFDINIR 300 MG: 300 CAPSULE ORAL at 04:13

## 2023-03-11 RX ADMIN — OMEPRAZOLE 20 MG: 20 CAPSULE, DELAYED RELEASE ORAL at 05:40

## 2023-03-11 RX ADMIN — METRONIDAZOLE 500 MG: 500 TABLET ORAL at 04:13

## 2023-03-11 RX ADMIN — LEVOTHYROXINE SODIUM 175 MCG: 0.1 TABLET ORAL at 04:12

## 2023-03-11 RX ADMIN — LOSARTAN POTASSIUM 50 MG: 50 TABLET, FILM COATED ORAL at 04:13

## 2023-03-11 ASSESSMENT — PAIN DESCRIPTION - PAIN TYPE: TYPE: ACUTE PAIN

## 2023-03-11 NOTE — CARE PLAN
The patient is Stable - Low risk of patient condition declining or worsening    Shift Goals  Clinical Goals: PO intake, safety  Patient Goals: rest, go home    Progress made toward(s) clinical / shift goals:  Tolerating diet, encouraging PO intake, denies abd pain, able to make her needs known.     Patient is not progressing towards the following goals:N/A    Problem: Pain - Standard  Goal: Alleviation of pain or a reduction in pain to the patient’s comfort goal  Outcome: Progressing     Problem: Knowledge Deficit - Standard  Goal: Patient and family/care givers will demonstrate understanding of plan of care, disease process/condition, diagnostic tests and medications  Outcome: Progressing

## 2023-03-11 NOTE — DISCHARGE SUMMARY
[facility] Internal Medicine Discharge Summary    Attending: Candice Brito M.d.  Senior Resident: Dr. Pinedo  Intern:  Dr. Macias  Contact Number: [phone]    CHIEF COMPLAINT ON ADMISSION  Chief Complaint   Patient presents with    Abdominal Pain     Center of abdomen starting last night, pt states pain started in R lower back / flank but is now only present in abdomen, LBM yesterday, denies signs of bleeding, pt endorses vomiting today, hx gastric bypass, cholecystectomy, and bowel obstruction per pt     Vomiting     Starting today, denies blood in vomit       Reason for Admission  abd pain     Admission Date  3/8/2023    CODE STATUS  Full Code    HPI & HOSPITAL COURSE  #Acute sigmoid diverticulitis  64 y/o woman with hx of gastric sleeve, cholecystectomy, and SBO who presented 3/8/23 with abdominal pain and admitted for sigmoid diverticulitis. CT A/P stating that perisigmoid inflammatory fat stranding is severe. Placed on bowel rest and started on IV ceftriaxone and flagyl on 3/9. Diet was advanced with no further abdominal pain, nausea, or vomiting. Will discharge with PO ciprofloxacin and flagyl and will finish 10d course (total abx 3/9 - 3/19). Will provide a short supply of anti-emetic as outpatient. Pt advised to maintain high fiber diet. Recommend close f/u with PCP to set up outpatient colonoscopy after 6 weeks.     Therefore, she is discharged in fair and stable condition to home with close outpatient follow-up.    The patient met 2-midnight criteria for an inpatient stay at the time of discharge.    Discharge Date  3/11/2023    Physical Exam on Day of Discharge  Physical Exam  Constitutional:       General: She is not in acute distress.     Appearance: Normal appearance. She is not ill-appearing.   HENT:      Head: Normocephalic and atraumatic.      Nose: Nose normal.      Mouth/Throat:      Mouth: Mucous membranes are moist.      Pharynx: Oropharynx is clear.   Eyes:      General: No scleral  icterus.     Conjunctiva/sclera: Conjunctivae normal.   Cardiovascular:      Rate and Rhythm: Normal rate and regular rhythm.      Pulses: Normal pulses.      Heart sounds: Murmur (2/6 systolic) heard.   Pulmonary:      Effort: Pulmonary effort is normal. No respiratory distress.      Breath sounds: Normal breath sounds. No wheezing or rales.   Abdominal:      General: Abdomen is flat. Bowel sounds are normal. There is no distension.      Palpations: Abdomen is soft. There is no mass.      Tenderness: There is no abdominal tenderness. There is no guarding or rebound.   Musculoskeletal:         General: No swelling or tenderness. Normal range of motion.      Cervical back: Normal range of motion and neck supple.   Skin:     General: Skin is warm and dry.      Coloration: Skin is not jaundiced or pale.   Neurological:      General: No focal deficit present.      Mental Status: She is alert and oriented to person, place, and time.      Motor: No weakness.      Gait: Gait normal.       FOLLOW UP ITEMS POST DISCHARGE  F/u with PCP for GI referral. Will need colonoscopy after 6 weeks.     DISCHARGE DIAGNOSES  Principal Problem:    Sigmoid diverticulitis POA: Yes  Active Problems:    Essential hypertension POA: Yes    Hypothyroid POA: Yes    History of gastric surgery POA: Yes    History of TIA (transient ischemic attack) POA: Unknown    Heart murmur POA: Unknown    Postural dizziness with presyncope POA: Yes  Resolved Problems:    Syncope POA: Yes      FOLLOW UP  Future Appointments   Date Time Provider Department Center   4/26/2023  2:15 PM Oscar Oliver M.D. [facility]     No follow-up provider specified.    MEDICATIONS ON DISCHARGE     Medication List        START taking these medications        Instructions   ciprofloxacin 500 MG Tabs  Commonly known as: CIPRO   Take 1 Tablet by mouth 2 times a day for 8 days.  Dose: 500 mg     metroNIDAZOLE 500 MG Tabs  Commonly known as: FLAGYL   Take 1 Tablet by mouth  "3 times a day for 8 days.  Dose: 500 mg     ondansetron 4 MG Tbdp  Commonly known as: ZOFRAN ODT   Take 1 Tablet by mouth every 6 hours as needed for Nausea/Vomiting.  Dose: 4 mg            CONTINUE taking these medications        Instructions   aspirin EC 81 MG Tbec  Commonly known as: ECOTRIN   Take 1 Tablet by mouth every day.  Dose: 81 mg     levothyroxine 175 MCG Tabs  Commonly known as: SYNTHROID   Take 1 Tablet by mouth every morning on an empty stomach.  Dose: 175 mcg     losartan 50 MG Tabs  Commonly known as: COZAAR   Take 1 Tablet by mouth every day.  Dose: 50 mg     pantoprazole 20 MG tablet  Commonly known as: PROTONIX   Take 1 Tablet by mouth every day.  Dose: 20 mg     rosuvastatin 10 MG Tabs  Commonly known as: CRESTOR   TAKE 1 TABLET BY MOUTH EVERY DAY  Dose: 10 mg              Allergies  Allergies   Allergen Reactions    Prednisone Hives and Rash     \"hives and rash\"    Promethazine Rash     Possible rash, not confirmed    Tape Rash     Paper tape,other tapes give her rash    Latex Rash              DIET  Orders Placed This Encounter   Procedures    Diet Order Diet: Regular; Nutrient modifications: (optional): High Fiber     Standing Status:   Standing     Number of Occurrences:   1     Order Specific Question:   Diet:     Answer:   Regular [1]     Order Specific Question:   Nutrient modifications: (optional)     Answer:   High Fiber [8]       ACTIVITY  As tolerated.  Weight bearing as tolerated    CONSULTATIONS  N/A    PROCEDURES  N/A    LABORATORY  Lab Results   Component Value Date    SODIUM 137 03/10/2023    POTASSIUM 3.8 03/10/2023    CHLORIDE 102 03/10/2023    CO2 21 03/10/2023    GLUCOSE 85 03/10/2023    BUN 7 (L) 03/10/2023    CREATININE 0.52 03/10/2023        Lab Results   Component Value Date    WBC 7.4 03/10/2023    HEMOGLOBIN 12.3 03/10/2023    HEMATOCRIT 36.9 (L) 03/10/2023    PLATELETCT 218 03/10/2023        Total time of the discharge process exceeds 45 minutes.  "

## 2023-03-12 NOTE — DOCUMENTATION QUERY
[facility]                                                                       Query Response Note      PATIENT:               BENJAMIN MAYNARD  ACCT #:                  [id number]  MRN:                     [id number]  :                      1960  ADMIT DATE:       3/8/2023 5:55 PM  DISCH DATE:        3/11/2023 11:15 AM  RESPONDING  PROVIDER #:        [id number]           QUERY TEXT:    BMI >40 is documented in the 3/9 dietary note.  Can a diagnosis be provided to support this finding?    The patient's Clinical Indicators include:  3/9 Dietary PN - BMI >40 (=Body mass index is 40.81 kg/m².), Class III obesity    Treatment - Dietary consult; Weight loss counseling not appropriate in acute care setting; refer to outpatient nutrition services    Risk factors - BMI 40.81    Thank you,  Viri GARCIAN  Clinical   Connect via [employer]  Options provided:   -- Class III obesity, (morbidly obese)   -- Findings of no clinical significance   -- Other explanation, (please specify the other explanation)   -- Unable to determine      Query created by: Viri Nava on 3/10/2023 10:30 AM    RESPONSE TEXT:    Findings of no clinical significance          Electronically signed by:  MAHAMED VO MD 3/12/2023 12:39 PM

## 2023-03-14 DIAGNOSIS — R93.3 ABNORMAL CT SCAN, COLON: ICD-10-CM

## 2023-03-14 DIAGNOSIS — K57.32 SIGMOID DIVERTICULITIS: ICD-10-CM

## 2023-03-14 LAB
BACTERIA BLD CULT: NORMAL
BACTERIA BLD CULT: NORMAL
SIGNIFICANT IND 70042: NORMAL
SIGNIFICANT IND 70042: NORMAL
SITE SITE: NORMAL
SITE SITE: NORMAL
SOURCE SOURCE: NORMAL
SOURCE SOURCE: NORMAL

## 2023-03-29 ENCOUNTER — OFFICE VISIT (OUTPATIENT)
Dept: MEDICAL GROUP | Facility: MEDICAL CENTER | Age: 63
End: 2023-03-29
Payer: COMMERCIAL

## 2023-03-29 VITALS
DIASTOLIC BLOOD PRESSURE: 88 MMHG | TEMPERATURE: 97.5 F | WEIGHT: 233.47 LBS | HEART RATE: 67 BPM | BODY MASS INDEX: 41.37 KG/M2 | OXYGEN SATURATION: 97 % | SYSTOLIC BLOOD PRESSURE: 154 MMHG | HEIGHT: 63 IN

## 2023-03-29 DIAGNOSIS — Z87.19 HISTORY OF DIVERTICULITIS OF COLON: ICD-10-CM

## 2023-03-29 DIAGNOSIS — E06.3 HYPOTHYROIDISM DUE TO HASHIMOTO'S THYROIDITIS: ICD-10-CM

## 2023-03-29 DIAGNOSIS — E66.01 MORBID OBESITY WITH BMI OF 40.0-44.9, ADULT (HCC): ICD-10-CM

## 2023-03-29 DIAGNOSIS — E78.5 DYSLIPIDEMIA: ICD-10-CM

## 2023-03-29 DIAGNOSIS — I10 ESSENTIAL HYPERTENSION: ICD-10-CM

## 2023-03-29 DIAGNOSIS — E03.8 HYPOTHYROIDISM DUE TO HASHIMOTO'S THYROIDITIS: ICD-10-CM

## 2023-03-29 PROCEDURE — 99214 OFFICE O/P EST MOD 30 MIN: CPT | Performed by: FAMILY MEDICINE

## 2023-03-29 RX ORDER — LEVOTHYROXINE SODIUM 175 UG/1
175 TABLET ORAL
Qty: 90 TABLET | Refills: 3 | Status: SHIPPED | OUTPATIENT
Start: 2023-03-29

## 2023-03-29 RX ORDER — LOSARTAN POTASSIUM 50 MG/1
50 TABLET ORAL DAILY
Qty: 90 TABLET | Refills: 3 | Status: SHIPPED | OUTPATIENT
Start: 2023-03-29

## 2023-03-29 RX ORDER — ROSUVASTATIN CALCIUM 10 MG/1
10 TABLET, COATED ORAL DAILY
Qty: 90 TABLET | Refills: 3 | Status: SHIPPED | OUTPATIENT
Start: 2023-03-29

## 2023-03-29 ASSESSMENT — FIBROSIS 4 INDEX: FIB4 SCORE: 1.03

## 2023-03-29 NOTE — PROGRESS NOTES
Chief Complaint   Patient presents with    Hospital Follow-up     Diverticulitis hospital stay x2w ago.        Subjective:     HPI:   Fatoumata Redding presents today with the following: Recent hospitalization 2 weeks ago    1. History of diverticulitis of colon  Patient was hospitalized with acute rather severe diverticulitis with no warning essentially.  She responded well to antibiotics.  She has been increasing the fiber in her diet and is now following a diet very heavy and high-fiber vegetables.  Discussed also supplementing with psyllium or other fiber supplement.  Denies any blood in the stool.  The pain in the sigmoid colon region has gone.  She will be having a follow-up colonoscopy which is scheduled in May.    2. Essential hypertension  HTN - Chronic condition stable. Currently taking all meds as directed.   She is taking baby aspirin daily.   She is monitoring BP at home.  At home her blood pressures are better in the 130s to 140s range.  Denies symptoms low BP: light-headed, tunnel-vision, unusual fatigue.   Denies symptoms high BP:pounding headache, visual changes, palpitations, flushed face.   Denies medicine side effects: unusual fatigue, slow heartbeat, foot/leg swelling, cough.  Losartan renewed.    3. Dyslipidemia  Patient denies chest pain, chest pressure, palpitations or exertional shortness of breath. Patient denies muscle aches or muscle weakness from the rosuvastatin medication. Patient is a never smoker. Patient takes 81 mg aspirin daily. Patient has no history of myocardial infarction or PVD.  She has a history of a remote TIA.  She does have carotid stenosis bilaterally the problem is clinically stable.    4. Hypothyroidism due to Hashimoto's thyroiditis  Patient reports good energy level on the medication. Patient denies insomnia, tremor or change in appetite.  Patient is taking the medication on an empty stomach in the morning and waiting at least 30 minutes before eating.  Last TSH a few  "weeks ago was in the normal range.  Her thyroid supplement is renewed.    5. Morbid obesity with BMI of 40.0-44.9, adult (HCC)  Patient's weight remains significantly elevated.  She is hoping that with improved diet and exercise this will improve as well.        Patient Active Problem List    Diagnosis Date Noted    Postural dizziness with presyncope 03/09/2023    Sigmoid diverticulitis 03/08/2023    Heart murmur 03/08/2023    Medial meniscus tear 12/02/2022    History of TIA (transient ischemic attack) 07/13/2022    BRCA gene mutation negative 07/13/2022    Primary osteoarthritis of right knee 07/13/2022    Plantar fasciitis, left 07/13/2022    Heberden's nodes of right hand 07/13/2022    Carotid atherosclerosis, bilateral 04/21/2022    Bilateral carotid artery stenosis 04/21/2022    Hashimoto's thyroiditis 04/21/2022    Hypothyroid 09/30/2019    History of gastric surgery 09/30/2019    Vitamin D deficiency 09/30/2019    Family history of breast cancer in sister 09/30/2019    Esophageal reflux 08/17/2011    Essential hypertension 08/17/2011       Current medicines (including changes today)  Current Outpatient Medications   Medication Sig Dispense Refill    losartan (COZAAR) 50 MG Tab Take 1 Tablet by mouth every day. 90 Tablet 3    levothyroxine (SYNTHROID) 175 MCG Tab Take 1 Tablet by mouth every morning on an empty stomach. 90 Tablet 3    rosuvastatin (CRESTOR) 10 MG Tab Take 1 Tablet by mouth every day. 90 Tablet 3    pantoprazole (PROTONIX) 20 MG tablet Take 1 Tablet by mouth every day. 90 Tablet 3    aspirin EC (ECOTRIN) 81 MG Tablet Delayed Response Take 1 Tablet by mouth every day. 30 Tablet 11     No current facility-administered medications for this visit.       Allergies   Allergen Reactions    Prednisone Hives and Rash     \"hives and rash\"    Promethazine Rash     Possible rash, not confirmed    Tape Rash     Paper tape,other tapes give her rash    Latex Rash              ROS: As per HPI  denies chest " "pain or shortness of breath       Objective:     BP (!) 154/88 (BP Location: Right arm, Patient Position: Sitting, BP Cuff Size: Large adult)   Pulse 67   Temp 36.4 °C (97.5 °F) (Temporal)   Ht 1.6 m (5' 3\")   Wt 106 kg (233 lb 7.5 oz)   SpO2 97%  Body mass index is 41.36 kg/m².    Physical Exam:  Constitutional: Well-developed and well-nourished. Not diaphoretic. No distress. Lucid and fluent.    Patient, physician and staff all wearing masks.  Skin: Skin is warm and dry. No rash noted.  Head: Atraumatic without lesions.  Eyes: Conjunctivae and extraocular motions are normal. Pupils are equal, round, and reactive to light. No scleral icterus.   Ears:  External ears unremarkable.   Neck: Supple, trachea midline. No thyromegaly present. No cervical or supraclavicular lymphadenopathy. No JVD or carotid bruits appreciated  Cardiovascular: Regular rate and rhythm.  Normal S1, S2 without murmur appreciated.  Chest: Effort normal. Clear to auscultation throughout. No adventitious sounds.   Abdomen: Soft, non tender, and without distention. Active bowel sounds in all four quadrants. No rebound, guarding, masses or hepatosplenomegaly.  Extremities: No cyanosis, clubbing, erythema, nor edema.   Neurological: Alert and oriented x 3.  Movements symmetric.  No tremor.  Psychiatric:  Behavior, mood, and affect are appropriate.       Assessment and Plan:     63 y.o. female with the following issues:    1. History of diverticulitis of colon        2. Essential hypertension  losartan (COZAAR) 50 MG Tab      3. Dyslipidemia  rosuvastatin (CRESTOR) 10 MG Tab      4. Hypothyroidism due to Hashimoto's thyroiditis  levothyroxine (SYNTHROID) 175 MCG Tab      5. Morbid obesity with BMI of 40.0-44.9, adult (HCC)  Patient identified as having weight management issue.  Appropriate orders and counseling given.            Followup: Return in about 4 months (around 7/29/2023), or if symptoms worsen or fail to improve.  "

## 2023-07-03 DIAGNOSIS — G89.29 CHRONIC PAIN OF RIGHT KNEE: ICD-10-CM

## 2023-07-03 DIAGNOSIS — M25.561 CHRONIC PAIN OF RIGHT KNEE: ICD-10-CM

## 2023-07-03 DIAGNOSIS — M25.561 RIGHT MEDIAL KNEE PAIN: ICD-10-CM

## 2023-09-07 ENCOUNTER — PREP FOR PROCEDURE (OUTPATIENT)
Dept: SURGERY | Facility: MEDICAL CENTER | Age: 63
End: 2023-09-07

## 2023-09-07 DIAGNOSIS — Z01.818 PRE-OP TESTING: ICD-10-CM

## 2023-09-07 PROBLEM — M17.11 OSTEOARTHRITIS OF RIGHT KNEE: Status: ACTIVE | Noted: 2023-09-07

## 2023-09-07 RX ORDER — CEFAZOLIN SODIUM 1 G/3ML
2 INJECTION, POWDER, FOR SOLUTION INTRAMUSCULAR; INTRAVENOUS ONCE
Status: CANCELLED | OUTPATIENT
Start: 2023-09-07 | End: 2023-09-07

## 2023-09-07 RX ORDER — VANCOMYCIN HYDROCHLORIDE 500 MG/10ML
15 INJECTION, POWDER, LYOPHILIZED, FOR SOLUTION INTRAVENOUS ONCE
Status: CANCELLED | OUTPATIENT
Start: 2023-09-07 | End: 2023-09-07

## 2023-09-27 ENCOUNTER — HOSPITAL ENCOUNTER (OUTPATIENT)
Dept: LAB | Facility: MEDICAL CENTER | Age: 63
End: 2023-09-27
Attending: ANESTHESIOLOGY
Payer: COMMERCIAL

## 2023-09-27 LAB
ALBUMIN SERPL BCP-MCNC: 4.2 G/DL (ref 3.2–4.9)
ANION GAP SERPL CALC-SCNC: 8 MMOL/L (ref 7–16)
BASOPHILS # BLD AUTO: 0.7 % (ref 0–1.8)
BASOPHILS # BLD: 0.04 K/UL (ref 0–0.12)
BUN SERPL-MCNC: 8 MG/DL (ref 8–22)
CALCIUM SERPL-MCNC: 9.3 MG/DL (ref 8.5–10.5)
CHLORIDE SERPL-SCNC: 104 MMOL/L (ref 96–112)
CO2 SERPL-SCNC: 26 MMOL/L (ref 20–33)
CREAT SERPL-MCNC: 0.65 MG/DL (ref 0.5–1.4)
EOSINOPHIL # BLD AUTO: 0.24 K/UL (ref 0–0.51)
EOSINOPHIL NFR BLD: 4.4 % (ref 0–6.9)
ERYTHROCYTE [DISTWIDTH] IN BLOOD BY AUTOMATED COUNT: 43.3 FL (ref 35.9–50)
EST. AVERAGE GLUCOSE BLD GHB EST-MCNC: 111 MG/DL
GFR SERPLBLD CREATININE-BSD FMLA CKD-EPI: 100 ML/MIN/1.73 M 2
GLUCOSE SERPL-MCNC: 89 MG/DL (ref 65–99)
HBA1C MFR BLD: 5.5 % (ref 4–5.6)
HCT VFR BLD AUTO: 42.5 % (ref 37–47)
HGB BLD-MCNC: 13.8 G/DL (ref 12–16)
IMM GRANULOCYTES # BLD AUTO: 0.02 K/UL (ref 0–0.11)
IMM GRANULOCYTES NFR BLD AUTO: 0.4 % (ref 0–0.9)
LYMPHOCYTES # BLD AUTO: 1.26 K/UL (ref 1–4.8)
LYMPHOCYTES NFR BLD: 23.2 % (ref 22–41)
MCH RBC QN AUTO: 28.7 PG (ref 27–33)
MCHC RBC AUTO-ENTMCNC: 32.5 G/DL (ref 32.2–35.5)
MCV RBC AUTO: 88.4 FL (ref 81.4–97.8)
MONOCYTES # BLD AUTO: 0.76 K/UL (ref 0–0.85)
MONOCYTES NFR BLD AUTO: 14 % (ref 0–13.4)
NEUTROPHILS # BLD AUTO: 3.1 K/UL (ref 1.82–7.42)
NEUTROPHILS NFR BLD: 57.3 % (ref 44–72)
NRBC # BLD AUTO: 0 K/UL
NRBC BLD-RTO: 0 /100 WBC (ref 0–0.2)
PLATELET # BLD AUTO: 222 K/UL (ref 164–446)
PMV BLD AUTO: 11.3 FL (ref 9–12.9)
POTASSIUM SERPL-SCNC: 4.2 MMOL/L (ref 3.6–5.5)
RBC # BLD AUTO: 4.81 M/UL (ref 4.2–5.4)
SODIUM SERPL-SCNC: 138 MMOL/L (ref 135–145)
WBC # BLD AUTO: 5.4 K/UL (ref 4.8–10.8)

## 2023-09-27 PROCEDURE — 83036 HEMOGLOBIN GLYCOSYLATED A1C: CPT

## 2023-09-27 PROCEDURE — 85025 COMPLETE CBC W/AUTO DIFF WBC: CPT

## 2023-09-27 PROCEDURE — 36415 COLL VENOUS BLD VENIPUNCTURE: CPT

## 2023-09-27 PROCEDURE — 80048 BASIC METABOLIC PNL TOTAL CA: CPT

## 2023-09-27 PROCEDURE — 82040 ASSAY OF SERUM ALBUMIN: CPT

## 2023-10-10 NOTE — OR SURGEON
Immediate Post OP Note    PreOp Diagnosis: Right medial meniscus tear      PostOp Diagnosis: Same      Procedure(s):  Right knee arthroscopy, partial medial menisectomy. - Wound Class: Clean    Surgeon(s):  Oscar Oliver M.D.    Anesthesiologist/Type of Anesthesia:  Anesthesiologist: Дмитрий Frazier D.O./* No anesthesia type entered *    Surgical Staff:  Circulator: Madhuri Lester R.N.  Scrub Person: Quan Herring    Specimens removed if any:  * No specimens in log *    Estimated Blood Loss: None     Findings: Complex posterior horn right medial meniscus tear, involving the root. Grade 4 chondromalacia right patella and medial femoral condyle.     Complications: None        12/23/2022 9:19 AM Oscar Oliver M.D.   F: none  E: Replace if K < 4, Mag < 2  N: Dash, diet  GI: ppi  DVT: plavix  Dispo: 5uris

## 2023-11-20 DIAGNOSIS — Z98.890 HISTORY OF GASTRIC SURGERY: ICD-10-CM

## 2023-11-20 DIAGNOSIS — E06.3 HASHIMOTO'S THYROIDITIS: ICD-10-CM

## 2023-11-20 DIAGNOSIS — R53.83 OTHER FATIGUE: ICD-10-CM

## 2023-11-20 NOTE — PROGRESS NOTES
Patient is very fatigued postsurgically.  Cannot keep much food down.  Lab orders are discussed and placed.

## 2023-11-30 ENCOUNTER — HOSPITAL ENCOUNTER (OUTPATIENT)
Dept: LAB | Facility: MEDICAL CENTER | Age: 63
End: 2023-11-30
Attending: FAMILY MEDICINE
Payer: COMMERCIAL

## 2023-11-30 DIAGNOSIS — E06.3 HASHIMOTO'S THYROIDITIS: ICD-10-CM

## 2023-11-30 DIAGNOSIS — R53.83 OTHER FATIGUE: ICD-10-CM

## 2023-11-30 DIAGNOSIS — Z98.890 HISTORY OF GASTRIC SURGERY: ICD-10-CM

## 2023-11-30 LAB
BASOPHILS # BLD AUTO: 0.7 % (ref 0–1.8)
BASOPHILS # BLD: 0.05 K/UL (ref 0–0.12)
EOSINOPHIL # BLD AUTO: 0.29 K/UL (ref 0–0.51)
EOSINOPHIL NFR BLD: 4.3 % (ref 0–6.9)
ERYTHROCYTE [DISTWIDTH] IN BLOOD BY AUTOMATED COUNT: 43 FL (ref 35.9–50)
HCT VFR BLD AUTO: 41.4 % (ref 37–47)
HGB BLD-MCNC: 13.5 G/DL (ref 12–16)
IMM GRANULOCYTES # BLD AUTO: 0.01 K/UL (ref 0–0.11)
IMM GRANULOCYTES NFR BLD AUTO: 0.1 % (ref 0–0.9)
LYMPHOCYTES # BLD AUTO: 1.74 K/UL (ref 1–4.8)
LYMPHOCYTES NFR BLD: 25.7 % (ref 22–41)
MCH RBC QN AUTO: 28.9 PG (ref 27–33)
MCHC RBC AUTO-ENTMCNC: 32.6 G/DL (ref 32.2–35.5)
MCV RBC AUTO: 88.7 FL (ref 81.4–97.8)
MONOCYTES # BLD AUTO: 0.59 K/UL (ref 0–0.85)
MONOCYTES NFR BLD AUTO: 8.7 % (ref 0–13.4)
NEUTROPHILS # BLD AUTO: 4.1 K/UL (ref 1.82–7.42)
NEUTROPHILS NFR BLD: 60.5 % (ref 44–72)
NRBC # BLD AUTO: 0 K/UL
NRBC BLD-RTO: 0 /100 WBC (ref 0–0.2)
PLATELET # BLD AUTO: 274 K/UL (ref 164–446)
PMV BLD AUTO: 10.6 FL (ref 9–12.9)
RBC # BLD AUTO: 4.67 M/UL (ref 4.2–5.4)
WBC # BLD AUTO: 6.8 K/UL (ref 4.8–10.8)

## 2023-11-30 PROCEDURE — 80053 COMPREHEN METABOLIC PANEL: CPT

## 2023-11-30 PROCEDURE — 85025 COMPLETE CBC W/AUTO DIFF WBC: CPT

## 2023-11-30 PROCEDURE — 36415 COLL VENOUS BLD VENIPUNCTURE: CPT

## 2023-11-30 PROCEDURE — 84443 ASSAY THYROID STIM HORMONE: CPT

## 2023-12-01 LAB
ALBUMIN SERPL BCP-MCNC: 4.1 G/DL (ref 3.2–4.9)
ALBUMIN/GLOB SERPL: 1.5 G/DL
ALP SERPL-CCNC: 70 U/L (ref 30–99)
ALT SERPL-CCNC: 14 U/L (ref 2–50)
ANION GAP SERPL CALC-SCNC: 8 MMOL/L (ref 7–16)
AST SERPL-CCNC: 20 U/L (ref 12–45)
BILIRUB SERPL-MCNC: 0.2 MG/DL (ref 0.1–1.5)
BUN SERPL-MCNC: 8 MG/DL (ref 8–22)
CALCIUM ALBUM COR SERPL-MCNC: 9 MG/DL (ref 8.5–10.5)
CALCIUM SERPL-MCNC: 9.1 MG/DL (ref 8.5–10.5)
CHLORIDE SERPL-SCNC: 106 MMOL/L (ref 96–112)
CO2 SERPL-SCNC: 26 MMOL/L (ref 20–33)
CREAT SERPL-MCNC: 0.73 MG/DL (ref 0.5–1.4)
GFR SERPLBLD CREATININE-BSD FMLA CKD-EPI: 93 ML/MIN/1.73 M 2
GLOBULIN SER CALC-MCNC: 2.7 G/DL (ref 1.9–3.5)
GLUCOSE SERPL-MCNC: 97 MG/DL (ref 65–99)
POTASSIUM SERPL-SCNC: 4 MMOL/L (ref 3.6–5.5)
PROT SERPL-MCNC: 6.8 G/DL (ref 6–8.2)
SODIUM SERPL-SCNC: 140 MMOL/L (ref 135–145)
TSH SERPL DL<=0.005 MIU/L-ACNC: 3.42 UIU/ML (ref 0.38–5.33)

## 2024-02-13 DIAGNOSIS — K21.9 GASTROESOPHAGEAL REFLUX DISEASE WITHOUT ESOPHAGITIS: ICD-10-CM

## 2024-02-14 RX ORDER — PANTOPRAZOLE SODIUM 20 MG/1
20 TABLET, DELAYED RELEASE ORAL DAILY
Qty: 90 TABLET | Refills: 0 | Status: SHIPPED | OUTPATIENT
Start: 2024-02-14

## 2024-03-27 SDOH — ECONOMIC STABILITY: FOOD INSECURITY: WITHIN THE PAST 12 MONTHS, THE FOOD YOU BOUGHT JUST DIDN'T LAST AND YOU DIDN'T HAVE MONEY TO GET MORE.: NEVER TRUE

## 2024-03-27 SDOH — ECONOMIC STABILITY: FOOD INSECURITY: WITHIN THE PAST 12 MONTHS, YOU WORRIED THAT YOUR FOOD WOULD RUN OUT BEFORE YOU GOT MONEY TO BUY MORE.: NEVER TRUE

## 2024-03-27 SDOH — HEALTH STABILITY: PHYSICAL HEALTH

## 2024-03-27 SDOH — ECONOMIC STABILITY: HOUSING INSECURITY

## 2024-03-27 SDOH — ECONOMIC STABILITY: INCOME INSECURITY: IN THE LAST 12 MONTHS, WAS THERE A TIME WHEN YOU WERE NOT ABLE TO PAY THE MORTGAGE OR RENT ON TIME?: NO

## 2024-03-27 SDOH — HEALTH STABILITY: PHYSICAL HEALTH: ON AVERAGE, HOW MANY DAYS PER WEEK DO YOU ENGAGE IN MODERATE TO STRENUOUS EXERCISE (LIKE A BRISK WALK)?: 5 DAYS

## 2024-03-27 SDOH — ECONOMIC STABILITY: INCOME INSECURITY: HOW HARD IS IT FOR YOU TO PAY FOR THE VERY BASICS LIKE FOOD, HOUSING, MEDICAL CARE, AND HEATING?: NOT HARD AT ALL

## 2024-03-27 SDOH — ECONOMIC STABILITY: TRANSPORTATION INSECURITY
IN THE PAST 12 MONTHS, HAS LACK OF TRANSPORTATION KEPT YOU FROM MEETINGS, WORK, OR FROM GETTING THINGS NEEDED FOR DAILY LIVING?: NO

## 2024-03-27 ASSESSMENT — SOCIAL DETERMINANTS OF HEALTH (SDOH)
IN A TYPICAL WEEK, HOW MANY TIMES DO YOU TALK ON THE PHONE WITH FAMILY, FRIENDS, OR NEIGHBORS?: MORE THAN THREE TIMES A WEEK
HOW OFTEN DO YOU ATTENT MEETINGS OF THE CLUB OR ORGANIZATION YOU BELONG TO?: NEVER
DO YOU BELONG TO ANY CLUBS OR ORGANIZATIONS SUCH AS CHURCH GROUPS UNIONS, FRATERNAL OR ATHLETIC GROUPS, OR SCHOOL GROUPS?: NO
HOW OFTEN DO YOU HAVE A DRINK CONTAINING ALCOHOL: 2-4 TIMES A MONTH
HOW OFTEN DO YOU ATTEND CHURCH OR RELIGIOUS SERVICES?: NEVER
HOW HARD IS IT FOR YOU TO PAY FOR THE VERY BASICS LIKE FOOD, HOUSING, MEDICAL CARE, AND HEATING?: NOT HARD AT ALL
HOW OFTEN DO YOU GET TOGETHER WITH FRIENDS OR RELATIVES?: ONCE A WEEK
HOW OFTEN DO YOU ATTEND CHURCH OR RELIGIOUS SERVICES?: NEVER
DO YOU BELONG TO ANY CLUBS OR ORGANIZATIONS SUCH AS CHURCH GROUPS UNIONS, FRATERNAL OR ATHLETIC GROUPS, OR SCHOOL GROUPS?: NO
IN A TYPICAL WEEK, HOW MANY TIMES DO YOU TALK ON THE PHONE WITH FAMILY, FRIENDS, OR NEIGHBORS?: MORE THAN THREE TIMES A WEEK
HOW OFTEN DO YOU GET TOGETHER WITH FRIENDS OR RELATIVES?: ONCE A WEEK
WITHIN THE PAST 12 MONTHS, YOU WORRIED THAT YOUR FOOD WOULD RUN OUT BEFORE YOU GOT THE MONEY TO BUY MORE: NEVER TRUE
HOW OFTEN DO YOU ATTENT MEETINGS OF THE CLUB OR ORGANIZATION YOU BELONG TO?: NEVER

## 2024-03-27 ASSESSMENT — LIFESTYLE VARIABLES: HOW OFTEN DO YOU HAVE A DRINK CONTAINING ALCOHOL: 2-4 TIMES A MONTH

## 2024-04-01 ENCOUNTER — APPOINTMENT (OUTPATIENT)
Dept: MEDICAL GROUP | Facility: MEDICAL CENTER | Age: 64
End: 2024-04-01
Payer: COMMERCIAL

## 2024-04-01 VITALS
HEART RATE: 73 BPM | TEMPERATURE: 98 F | SYSTOLIC BLOOD PRESSURE: 132 MMHG | DIASTOLIC BLOOD PRESSURE: 82 MMHG | OXYGEN SATURATION: 98 % | BODY MASS INDEX: 39.69 KG/M2 | WEIGHT: 224 LBS | RESPIRATION RATE: 18 BRPM | HEIGHT: 63 IN

## 2024-04-01 DIAGNOSIS — Z00.00 ROUTINE GENERAL MEDICAL EXAMINATION AT A HEALTH CARE FACILITY: ICD-10-CM

## 2024-04-01 DIAGNOSIS — E55.9 VITAMIN D DEFICIENCY: ICD-10-CM

## 2024-04-01 DIAGNOSIS — E66.9 OBESITY, CLASS II, BMI 35-39.9: ICD-10-CM

## 2024-04-01 DIAGNOSIS — Z12.39 SCREENING BREAST EXAMINATION: ICD-10-CM

## 2024-04-01 DIAGNOSIS — Z00.00 LABORATORY EXAMINATION ORDERED AS PART OF A ROUTINE GENERAL MEDICAL EXAMINATION: ICD-10-CM

## 2024-04-01 PROCEDURE — 99396 PREV VISIT EST AGE 40-64: CPT | Performed by: FAMILY MEDICINE

## 2024-04-01 PROCEDURE — 3075F SYST BP GE 130 - 139MM HG: CPT | Performed by: FAMILY MEDICINE

## 2024-04-01 PROCEDURE — 3079F DIAST BP 80-89 MM HG: CPT | Performed by: FAMILY MEDICINE

## 2024-04-01 ASSESSMENT — ENCOUNTER SYMPTOMS
SHORTNESS OF BREATH: 0
DOUBLE VISION: 0
BLURRED VISION: 0
SPUTUM PRODUCTION: 0
SORE THROAT: 0
NECK PAIN: 0
WEIGHT LOSS: 0
TINGLING: 0
TREMORS: 0
BRUISES/BLEEDS EASILY: 0
SENSORY CHANGE: 0
FOCAL WEAKNESS: 0
DEPRESSION: 0
BLOOD IN STOOL: 0
DIZZINESS: 0
DIAPHORESIS: 0
PALPITATIONS: 0
ORTHOPNEA: 0
HALLUCINATIONS: 0
HEADACHES: 1
WEAKNESS: 0
COUGH: 0
WHEEZING: 0
VOMITING: 0
DIARRHEA: 0
BACK PAIN: 0
NAUSEA: 0
SPEECH CHANGE: 0
NERVOUS/ANXIOUS: 0
INSOMNIA: 0
CHILLS: 0
HEARTBURN: 0
CONSTIPATION: 0
MYALGIAS: 0
ABDOMINAL PAIN: 0
FEVER: 0

## 2024-04-01 ASSESSMENT — LIFESTYLE VARIABLES: SUBSTANCE_ABUSE: 0

## 2024-04-01 ASSESSMENT — PATIENT HEALTH QUESTIONNAIRE - PHQ9: CLINICAL INTERPRETATION OF PHQ2 SCORE: 0

## 2024-04-01 ASSESSMENT — FIBROSIS 4 INDEX: FIB4 SCORE: 1.21

## 2024-04-01 NOTE — PROGRESS NOTES
Subjective     Fatoumata Redding is a 64 y.o. female who presents with Annual Wellness Visit    She is here for her annual examination        HPI     has a past medical history of Bilateral carotid artery stenosis (04/21/2022), BRCA gene mutation negative (07/13/2022), Carotid atherosclerosis, bilateral (04/21/2022), Hashimoto's thyroiditis (04/21/2022), Hyperlipidemia, Hypertension, Pain (12/13/2022), palpitations, PONV (postoperative nausea and vomiting), Snoring, Thyroid disease, and TIA (transient ischemic attack).   has a past surgical history that includes abdominal exploration (2013); abdominal exploration (2011); cholecystectomy; primary c section; cervical disk and fusion anterior (2004); meniscectomy, knee, arthroscopic (Right, 12/23/2022); and pr total knee arthroplasty (Right, 10/12/2023).  family history includes Cancer in her paternal grandfather and sister; Cancer (age of onset: 55) in her sister; Cancer (age of onset: 74) in her father; Cancer (age of onset: 79) in her mother; Diabetes in her maternal grandmother and sister; Stroke (age of onset: 70) in her father.   reports that she has never smoked. She has never used smokeless tobacco. She reports current alcohol use. She reports that she does not currently use drugs.      Current Outpatient Medications:     pantoprazole (PROTONIX) 20 MG tablet, TAKE 1 TABLET BY MOUTH ONCE DAILY, Disp: 90 Tablet, Rfl: 0    losartan (COZAAR) 50 MG Tab, Take 1 Tablet by mouth every day., Disp: 90 Tablet, Rfl: 3    levothyroxine (SYNTHROID) 175 MCG Tab, Take 1 Tablet by mouth every morning on an empty stomach., Disp: 90 Tablet, Rfl: 3    rosuvastatin (CRESTOR) 10 MG Tab, Take 1 Tablet by mouth every day., Disp: 90 Tablet, Rfl: 3    aspirin EC (ECOTRIN) 81 MG Tablet Delayed Response, Take 1 Tablet by mouth every day., Disp: 30 Tablet, Rfl: 11  is allergic to prednisone, promethazine, tape, and latex.    Review of Systems   Constitutional:  Negative for chills,  "diaphoresis, fever, malaise/fatigue and weight loss.   HENT:  Negative for congestion, hearing loss, sore throat and tinnitus.    Eyes:  Negative for blurred vision and double vision.   Respiratory:  Negative for cough, sputum production, shortness of breath and wheezing.    Cardiovascular:  Negative for chest pain, palpitations, orthopnea and leg swelling.   Gastrointestinal:  Negative for abdominal pain, blood in stool, constipation, diarrhea, heartburn, nausea and vomiting.   Genitourinary:  Negative for dysuria, frequency, hematuria and urgency.   Musculoskeletal:  Negative for back pain, joint pain, myalgias and neck pain.   Skin:  Negative for rash.   Neurological:  Positive for headaches. Negative for dizziness, tingling, tremors, sensory change, speech change, focal weakness and weakness.        Occasional headaches   Endo/Heme/Allergies:  Negative for environmental allergies. Does not bruise/bleed easily.        Occasional bruising   Psychiatric/Behavioral:  Negative for depression, hallucinations, substance abuse and suicidal ideas. The patient is not nervous/anxious and does not have insomnia.           Objective     /82   Pulse 73   Temp 36.7 °C (98 °F)   Resp 18   Ht 1.6 m (5' 3\")   Wt 102 kg (224 lb)   LMP  (LMP Unknown)   SpO2 98%   BMI 39.68 kg/m²      Physical Exam  Vitals reviewed.   Constitutional:       Appearance: She is well-developed.   HENT:      Head: Normocephalic and atraumatic.   Eyes:      General:         Left eye: No discharge.      Pupils: Pupils are equal, round, and reactive to light.   Neck:      Thyroid: No thyromegaly.      Vascular: No JVD.   Cardiovascular:      Rate and Rhythm: Normal rate and regular rhythm.      Heart sounds: Normal heart sounds. No murmur heard.  Pulmonary:      Effort: Pulmonary effort is normal. No respiratory distress.      Breath sounds: Normal breath sounds. No wheezing or rales.   Abdominal:      General: Bowel sounds are normal. There " is no distension.      Palpations: Abdomen is soft. There is no mass.      Tenderness: There is no abdominal tenderness.   Musculoskeletal:         General: Normal range of motion.      Cervical back: Normal range of motion and neck supple.   Lymphadenopathy:      Cervical: No cervical adenopathy.   Skin:     General: Skin is warm and dry.      Findings: No erythema or rash.   Neurological:      Mental Status: She is alert and oriented to person, place, and time.      Coordination: Coordination normal.   Psychiatric:         Mood and Affect: Mood normal.         Behavior: Behavior normal.             Anticipatory guidance:  seatbelt worn.  Yearly preventive examinations recommended.  Mammogram order placed  is BRCA negative.  Pap smear due 2025.  Colonoscopy due 2026.  Immunizations discussed.      Assessment & Plan        1. Routine general medical examination at a health care facility  She is generally well.    2. Laboratory examination ordered as part of a routine general medical examination  Follow up orders discussed and placed.  - Comp Metabolic Panel; Future  - CBC WITHOUT DIFFERENTIAL; Future  - TSH; Future  - Lipid Profile; Future    3. Obesity, Class II, BMI 35-39.9  Is trying to reduce weight.  Better diet.  More exercise.  - Patient identified as having weight management issue.  Appropriate orders and counseling given.    4. Screening breast examination  Mammogram order discussed and placed  - MA-SCREENING MAMMO BILAT W/TOMOSYNTHESIS W/CAD; Future    5. Vitamin D deficiency  Follow up lab order discussed and placed.  - VITAMIN D,25 HYDROXY (DEFICIENCY); Future        Recheck one year, sooner as needed

## 2024-04-02 DIAGNOSIS — I10 ESSENTIAL HYPERTENSION: ICD-10-CM

## 2024-04-02 DIAGNOSIS — E78.5 DYSLIPIDEMIA: ICD-10-CM

## 2024-04-02 DIAGNOSIS — E06.3 HYPOTHYROIDISM DUE TO HASHIMOTO'S THYROIDITIS: ICD-10-CM

## 2024-04-02 DIAGNOSIS — K21.9 GASTROESOPHAGEAL REFLUX DISEASE WITHOUT ESOPHAGITIS: ICD-10-CM

## 2024-04-02 DIAGNOSIS — E03.8 HYPOTHYROIDISM DUE TO HASHIMOTO'S THYROIDITIS: ICD-10-CM

## 2024-04-02 RX ORDER — LEVOTHYROXINE SODIUM 175 UG/1
175 TABLET ORAL
Qty: 90 TABLET | Refills: 0 | Status: SHIPPED | OUTPATIENT
Start: 2024-04-02

## 2024-04-02 RX ORDER — LOSARTAN POTASSIUM 50 MG/1
50 TABLET ORAL DAILY
Qty: 90 TABLET | Refills: 3 | Status: SHIPPED | OUTPATIENT
Start: 2024-04-02

## 2024-04-02 RX ORDER — ROSUVASTATIN CALCIUM 10 MG/1
10 TABLET, COATED ORAL DAILY
Qty: 90 TABLET | Refills: 3 | Status: SHIPPED | OUTPATIENT
Start: 2024-04-02

## 2024-04-12 ENCOUNTER — HOSPITAL ENCOUNTER (OUTPATIENT)
Dept: LAB | Facility: MEDICAL CENTER | Age: 64
End: 2024-04-12
Attending: FAMILY MEDICINE
Payer: COMMERCIAL

## 2024-04-12 DIAGNOSIS — E55.9 VITAMIN D DEFICIENCY: ICD-10-CM

## 2024-04-12 DIAGNOSIS — Z00.00 LABORATORY EXAMINATION ORDERED AS PART OF A ROUTINE GENERAL MEDICAL EXAMINATION: ICD-10-CM

## 2024-04-12 LAB
25(OH)D3 SERPL-MCNC: 16 NG/ML (ref 30–100)
ALBUMIN SERPL BCP-MCNC: 4.3 G/DL (ref 3.2–4.9)
ALBUMIN/GLOB SERPL: 1.7 G/DL
ALP SERPL-CCNC: 75 U/L (ref 30–99)
ALT SERPL-CCNC: 14 U/L (ref 2–50)
ANION GAP SERPL CALC-SCNC: 11 MMOL/L (ref 7–16)
AST SERPL-CCNC: 23 U/L (ref 12–45)
BILIRUB SERPL-MCNC: 0.3 MG/DL (ref 0.1–1.5)
BUN SERPL-MCNC: 8 MG/DL (ref 8–22)
CALCIUM ALBUM COR SERPL-MCNC: 8.8 MG/DL (ref 8.5–10.5)
CALCIUM SERPL-MCNC: 9 MG/DL (ref 8.5–10.5)
CHLORIDE SERPL-SCNC: 106 MMOL/L (ref 96–112)
CHOLEST SERPL-MCNC: 151 MG/DL (ref 100–199)
CO2 SERPL-SCNC: 24 MMOL/L (ref 20–33)
CREAT SERPL-MCNC: 0.52 MG/DL (ref 0.5–1.4)
ERYTHROCYTE [DISTWIDTH] IN BLOOD BY AUTOMATED COUNT: 42.5 FL (ref 35.9–50)
FASTING STATUS PATIENT QL REPORTED: NORMAL
GFR SERPLBLD CREATININE-BSD FMLA CKD-EPI: 105 ML/MIN/1.73 M 2
GLOBULIN SER CALC-MCNC: 2.6 G/DL (ref 1.9–3.5)
GLUCOSE SERPL-MCNC: 74 MG/DL (ref 65–99)
HCT VFR BLD AUTO: 42.5 % (ref 37–47)
HDLC SERPL-MCNC: 77 MG/DL
HGB BLD-MCNC: 14.3 G/DL (ref 12–16)
LDLC SERPL CALC-MCNC: 59 MG/DL
MCH RBC QN AUTO: 28.7 PG (ref 27–33)
MCHC RBC AUTO-ENTMCNC: 33.6 G/DL (ref 32.2–35.5)
MCV RBC AUTO: 85.3 FL (ref 81.4–97.8)
PLATELET # BLD AUTO: 231 K/UL (ref 164–446)
PMV BLD AUTO: 11.1 FL (ref 9–12.9)
POTASSIUM SERPL-SCNC: 4 MMOL/L (ref 3.6–5.5)
PROT SERPL-MCNC: 6.9 G/DL (ref 6–8.2)
RBC # BLD AUTO: 4.98 M/UL (ref 4.2–5.4)
SODIUM SERPL-SCNC: 141 MMOL/L (ref 135–145)
TRIGL SERPL-MCNC: 74 MG/DL (ref 0–149)
TSH SERPL DL<=0.005 MIU/L-ACNC: 1.68 UIU/ML (ref 0.38–5.33)
WBC # BLD AUTO: 5.2 K/UL (ref 4.8–10.8)

## 2024-04-12 PROCEDURE — 80053 COMPREHEN METABOLIC PANEL: CPT

## 2024-04-12 PROCEDURE — 82306 VITAMIN D 25 HYDROXY: CPT

## 2024-04-12 PROCEDURE — 85027 COMPLETE CBC AUTOMATED: CPT

## 2024-04-12 PROCEDURE — 80061 LIPID PANEL: CPT

## 2024-04-12 PROCEDURE — 36415 COLL VENOUS BLD VENIPUNCTURE: CPT

## 2024-04-12 PROCEDURE — 84443 ASSAY THYROID STIM HORMONE: CPT

## 2024-04-15 ENCOUNTER — HOSPITAL ENCOUNTER (OUTPATIENT)
Dept: RADIOLOGY | Facility: MEDICAL CENTER | Age: 64
End: 2024-04-15
Attending: FAMILY MEDICINE
Payer: COMMERCIAL

## 2024-04-15 DIAGNOSIS — Z12.39 SCREENING BREAST EXAMINATION: ICD-10-CM

## 2024-04-15 PROCEDURE — 77067 SCR MAMMO BI INCL CAD: CPT

## 2024-04-15 NOTE — PROCEDURES
Patient became lightheaded during screening mammogram. Patient sat in chair, offered crackers and juice, pt declined. John Haynes brought into mammography room.Hives noted on patients torso. Patient stated she gets hives frequently. Patient stated lightheaded feeling resolved and that she was ok to leave. Patient ambulated out of facility.

## 2024-05-15 DIAGNOSIS — K21.9 GASTROESOPHAGEAL REFLUX DISEASE WITHOUT ESOPHAGITIS: ICD-10-CM

## 2024-05-15 RX ORDER — PANTOPRAZOLE SODIUM 20 MG/1
20 TABLET, DELAYED RELEASE ORAL DAILY
Qty: 90 TABLET | Refills: 0 | Status: SHIPPED | OUTPATIENT
Start: 2024-05-15

## 2024-05-29 DIAGNOSIS — M25.522 LEFT ELBOW PAIN: ICD-10-CM

## 2024-05-29 DIAGNOSIS — M25.512 ACUTE PAIN OF LEFT SHOULDER: ICD-10-CM

## 2024-05-29 NOTE — PROGRESS NOTES
Orders placed and referral to orthopedics for evaluation of left elbow pain and swelling with some left shoulder pain present 2 weeks or more with no known trigger.

## 2024-05-30 ENCOUNTER — APPOINTMENT (OUTPATIENT)
Dept: RADIOLOGY | Facility: MEDICAL CENTER | Age: 64
End: 2024-05-30
Attending: FAMILY MEDICINE
Payer: COMMERCIAL

## 2024-05-30 DIAGNOSIS — M25.512 ACUTE PAIN OF LEFT SHOULDER: ICD-10-CM

## 2024-05-30 DIAGNOSIS — M25.522 LEFT ELBOW PAIN: ICD-10-CM

## 2024-05-30 PROCEDURE — 73030 X-RAY EXAM OF SHOULDER: CPT | Mod: LT

## 2024-05-30 PROCEDURE — 73080 X-RAY EXAM OF ELBOW: CPT | Mod: LT

## 2024-06-03 DIAGNOSIS — M25.522 LEFT ELBOW PAIN: ICD-10-CM

## 2024-06-03 NOTE — PROGRESS NOTES
Patient has persistent left elbow pain and swelling.  Plain x-ray did not give us a diagnosis.  Referral to Dr. Oliver, orthopedist is placed.

## 2024-06-21 DIAGNOSIS — R45.89 DEPRESSED MOOD: ICD-10-CM

## 2024-07-02 DIAGNOSIS — E06.3 HYPOTHYROIDISM DUE TO HASHIMOTO'S THYROIDITIS: ICD-10-CM

## 2024-07-02 DIAGNOSIS — E03.8 HYPOTHYROIDISM DUE TO HASHIMOTO'S THYROIDITIS: ICD-10-CM

## 2024-07-02 RX ORDER — LEVOTHYROXINE SODIUM 175 UG/1
175 TABLET ORAL
Qty: 90 TABLET | Refills: 0 | Status: SHIPPED | OUTPATIENT
Start: 2024-07-02

## 2024-08-10 DIAGNOSIS — K21.9 GASTROESOPHAGEAL REFLUX DISEASE WITHOUT ESOPHAGITIS: ICD-10-CM

## 2024-08-12 RX ORDER — PANTOPRAZOLE SODIUM 20 MG/1
20 TABLET, DELAYED RELEASE ORAL DAILY
Qty: 90 TABLET | Refills: 0 | Status: SHIPPED | OUTPATIENT
Start: 2024-08-12

## 2024-08-12 NOTE — TELEPHONE ENCOUNTER
Received request via: Pharmacy    Was the patient seen in the last year in this department? Yes    Does the patient have an active prescription (recently filled or refills available) for medication(s) requested? No    Pharmacy Name: [facility] #     Does the patient have USP Plus and need 100-day supply? (This applies to ALL medications) Patient does not have SCP

## 2024-09-29 DIAGNOSIS — E06.3 HYPOTHYROIDISM DUE TO HASHIMOTO'S THYROIDITIS: ICD-10-CM

## 2024-09-29 DIAGNOSIS — E03.8 HYPOTHYROIDISM DUE TO HASHIMOTO'S THYROIDITIS: ICD-10-CM

## 2024-09-29 RX ORDER — LEVOTHYROXINE SODIUM 175 UG/1
175 TABLET ORAL
Qty: 90 TABLET | Refills: 0 | Status: SHIPPED | OUTPATIENT
Start: 2024-09-29

## 2024-11-05 DIAGNOSIS — K21.9 GASTROESOPHAGEAL REFLUX DISEASE WITHOUT ESOPHAGITIS: ICD-10-CM

## 2024-11-05 RX ORDER — PANTOPRAZOLE SODIUM 20 MG/1
20 TABLET, DELAYED RELEASE ORAL DAILY
Qty: 90 TABLET | Refills: 1 | Status: SHIPPED | OUTPATIENT
Start: 2024-11-05

## 2024-11-05 NOTE — TELEPHONE ENCOUNTER
Received request via: Pharmacy    Was the patient seen in the last year in this department? Yes    Does the patient have an active prescription (recently filled or refills available) for medication(s) requested? No    Pharmacy Name:  [facility] # [place], [place] - [address]     Does the patient have assisted Plus and need 100-day supply? (This applies to ALL medications) Patient does not have SCP

## 2025-02-23 DIAGNOSIS — E06.3 HYPOTHYROIDISM DUE TO HASHIMOTO'S THYROIDITIS: ICD-10-CM

## 2025-02-24 RX ORDER — LEVOTHYROXINE SODIUM 175 UG/1
175 TABLET ORAL
Qty: 90 TABLET | Refills: 0 | Status: SHIPPED | OUTPATIENT
Start: 2025-02-24

## 2025-02-24 NOTE — TELEPHONE ENCOUNTER
Received request via: Pharmacy    Was the patient seen in the last year in this department? Yes    Does the patient have an active prescription (recently filled or refills available) for medication(s) requested? No    Pharmacy Name:   [facility] # [place] - [id number]  [address] [place] [id number]  Phone: [phone] Fax: [phone]    Does the patient have CHCF Plus and need 100-day supply? (This applies to ALL medications) Patient does not have SCP

## 2025-02-25 ENCOUNTER — OFFICE VISIT (OUTPATIENT)
Dept: MEDICAL GROUP | Facility: MEDICAL CENTER | Age: 65
End: 2025-02-25
Payer: COMMERCIAL

## 2025-02-25 VITALS
HEIGHT: 63 IN | OXYGEN SATURATION: 97 % | WEIGHT: 238.1 LBS | DIASTOLIC BLOOD PRESSURE: 94 MMHG | BODY MASS INDEX: 42.19 KG/M2 | TEMPERATURE: 97.3 F | RESPIRATION RATE: 16 BRPM | HEART RATE: 72 BPM | SYSTOLIC BLOOD PRESSURE: 146 MMHG

## 2025-02-25 DIAGNOSIS — Z12.39 SCREENING BREAST EXAMINATION: ICD-10-CM

## 2025-02-25 DIAGNOSIS — E55.9 VITAMIN D DEFICIENCY: ICD-10-CM

## 2025-02-25 DIAGNOSIS — E06.3 HYPOTHYROIDISM DUE TO HASHIMOTO THYROIDITIS: ICD-10-CM

## 2025-02-25 DIAGNOSIS — I65.23 CAROTID ATHEROSCLEROSIS, BILATERAL: ICD-10-CM

## 2025-02-25 DIAGNOSIS — R22.1 NECK MASS: ICD-10-CM

## 2025-02-25 DIAGNOSIS — I10 ESSENTIAL HYPERTENSION: ICD-10-CM

## 2025-02-25 DIAGNOSIS — E66.01 MORBID OBESITY WITH BMI OF 40.0-44.9, ADULT (HCC): ICD-10-CM

## 2025-02-25 DIAGNOSIS — K21.9 GASTROESOPHAGEAL REFLUX DISEASE WITHOUT ESOPHAGITIS: ICD-10-CM

## 2025-02-25 PROCEDURE — 3080F DIAST BP >= 90 MM HG: CPT | Performed by: FAMILY MEDICINE

## 2025-02-25 PROCEDURE — 99214 OFFICE O/P EST MOD 30 MIN: CPT | Performed by: FAMILY MEDICINE

## 2025-02-25 PROCEDURE — 3077F SYST BP >= 140 MM HG: CPT | Performed by: FAMILY MEDICINE

## 2025-02-25 RX ORDER — PANTOPRAZOLE SODIUM 20 MG/1
20 TABLET, DELAYED RELEASE ORAL DAILY
Qty: 90 TABLET | Refills: 3 | Status: SHIPPED | OUTPATIENT
Start: 2025-02-25

## 2025-02-25 ASSESSMENT — FIBROSIS 4 INDEX: FIB4 SCORE: 1.68

## 2025-02-25 ASSESSMENT — PATIENT HEALTH QUESTIONNAIRE - PHQ9: CLINICAL INTERPRETATION OF PHQ2 SCORE: 0

## 2025-02-25 NOTE — PROGRESS NOTES
Chief Complaint   Patient presents with    Follow-Up    Bump     Lower throat x6 days     Obesity    Hypothyroidism    Hypertension       Subjective:     HPI:   Fatoumata Redding presents today with the followin. Neck mass  Patient noticed a soft swelling on the anterior neck about a week ago.  It is tender if pushed in 1 direction but not tender on every manipulation.  Denies any pain with swallowing.  Denies any pain with moving the neck.  Patient has history of hypothyroidism.  However, this appears to be above the thyroid tissue.  I agree with the patient that this seems to be a lipoma but it is actually quite unclear, borders are not well-defined.  Soft tissue ultrasound ordered discussed and placed.    2. Hypothyroidism due to Hashimoto thyroiditis  Patient reports good energy level on the medication. Patient denies insomnia, tremor or change in appetite.  Patient is taking the medication on an empty stomach in the morning and waiting at least 30 minutes before eating.  Last TSH in April last year was at target.  Lab order discussed and placed.    3. Essential hypertension  HTN - Chronic condition stable. Currently taking all meds as directed.   She is taking baby aspirin daily.   She is not monitoring BP at home.  Discussed importance of checking at least twice a month.  Blood pressure today is somewhat high.  Patient feels this is unusual for her.  Will communicate her readings.  Denies symptoms low BP: light-headed, tunnel-vision, unusual fatigue.   Denies symptoms high BP:pounding headache, visual changes, palpitations, flushed face.   Denies medicine side effects: unusual fatigue, slow heartbeat, foot/leg swelling, cough.  Lab orders discussed and placed.    4. Carotid atherosclerosis, bilateral  Patient has history of carotid atherosclerosis and TIA with normal lipids on blood testing.  For this reason she is on rosuvastatin.  She also takes 81 mg aspirin daily.  Follow-up lab orders discussed and  placed.    5. Gastroesophageal reflux disease without esophagitis  The patient feels the current medication regimen of pantoprazole is controlling the gastroesophageal reflux symptoms well. Denies dysphagia, reflux symptoms, acidity, abdominal pain or visible blood or mucus in the stool. Denies vomiting or hematemesis. Denies burping or abdominal bloating. Patient avoids nonsteroidal anti-inflammatory drugs. Avoids heavy meals or eating within 2 hours of bedtime.  Follow-up orders discussed and placed.    6. Vitamin D deficiency  Patient has history of vitamin D deficiency.  She is taking vitamin D, unsure of dose.  Follow-up orders discussed and placed.    7. Morbid obesity with BMI of 40.0-44.9, adult ([facility])  Patient has been working on diet.  She is now on a keto diet that is very high in vegetables and fiber.  She is also resuming her exercise which is generally riding her bike.    8. Screening breast examination  Mammogram order discussed and placed        Patient Active Problem List    Diagnosis Date Noted    Osteoarthritis of right knee 09/07/2023    Postural dizziness with presyncope 03/09/2023    Sigmoid diverticulitis 03/08/2023    Heart murmur 03/08/2023    Medial meniscus tear 12/02/2022    History of TIA (transient ischemic attack) 07/13/2022    BRCA gene mutation negative 07/13/2022    Primary osteoarthritis of right knee 07/13/2022    Plantar fasciitis, left 07/13/2022    Heberden's nodes of right hand 07/13/2022    Carotid atherosclerosis, bilateral 04/21/2022    Bilateral carotid artery stenosis 04/21/2022    Hashimoto's thyroiditis 04/21/2022    Hypothyroid 09/30/2019    History of gastric surgery 09/30/2019    Vitamin D deficiency 09/30/2019    Family history of breast cancer in sister 09/30/2019    Esophageal reflux 08/17/2011    Essential hypertension 08/17/2011       Current medicines (including changes today)  Current Outpatient Medications   Medication Sig Dispense Refill    Cholecalciferol  "(D3 ADULT PO) Take 2,000 Units by mouth every day.      pantoprazole (PROTONIX) 20 MG tablet Take 1 Tablet by mouth every day. 90 Tablet 3    levothyroxine (SYNTHROID) 175 MCG Tab TAKE 1 TABLET BY MOUTH EVERY MORNING ON AN EMPTY STOMACH. 90 Tablet 0    losartan (COZAAR) 50 MG Tab Take 1 Tablet by mouth every day. 90 Tablet 3    rosuvastatin (CRESTOR) 10 MG Tab Take 1 Tablet by mouth every day. 90 Tablet 3    aspirin EC (ECOTRIN) 81 MG Tablet Delayed Response Take 1 Tablet by mouth every day. 30 Tablet 11     No current facility-administered medications for this visit.       Allergies   Allergen Reactions    Prednisone Hives and Rash     \"hives and rash\"    Promethazine Rash     Possible rash, not confirmed    Tape Rash     Paper tape,other tapes give her rash    Latex Rash              ROS: As per HPI       Objective:     BP (!) 146/94 (BP Location: Left arm)   Pulse 72   Temp 36.3 °C (97.3 °F)   Resp 16   Ht 1.6 m (5' 3\")   Wt 108 kg (238 lb 1.6 oz)   SpO2 97%  Body mass index is 42.18 kg/m².    Physical Exam:  Constitutional: Well-developed and well-nourished. Not diaphoretic. No distress. Lucid and fluent.  Skin: Skin is warm and dry. No rash noted.  Head: Atraumatic without lesions.  Eyes: Conjunctivae and extraocular motions are normal. Pupils are equal, round, and reactive to light. No scleral icterus.   Ears:  External ears unremarkable.  Neck: Supple, trachea midline. No thyromegaly present. No cervical or supraclavicular lymphadenopathy. No JVD or carotid bruits appreciated  Cardiovascular: Regular rate and rhythm.  Normal S1, S2 without murmur appreciated.  Chest: Effort normal. Clear to auscultation throughout. No adventitious sounds.   Abdomen: Soft, non tender, and without distention. Active bowel sounds in all four quadrants. No rebound, guarding, masses or hepatosplenomegaly.  Extremities: No cyanosis, clubbing, erythema, nor edema.   Neurological: Alert and oriented x 3.  No tremor, movements " symmetric.  Psychiatric:  Behavior, mood, and affect are appropriate.       Assessment and Plan:     65 y.o. female with the following issues:    1. Neck mass  US-SOFT TISSUES OF HEAD - NECK      2. Hypothyroidism due to Hashimoto thyroiditis  TSH      3. Essential hypertension  Comp Metabolic Panel    CBC WITHOUT DIFFERENTIAL    Lipid Profile      4. Carotid atherosclerosis, bilateral  Comp Metabolic Panel    CBC WITHOUT DIFFERENTIAL    TSH    Lipid Profile      5. Gastroesophageal reflux disease without esophagitis  Comp Metabolic Panel    CBC WITHOUT DIFFERENTIAL    pantoprazole (PROTONIX) 20 MG tablet      6. Vitamin D deficiency  VITAMIN D,25 HYDROXY (DEFICIENCY)      7. Morbid obesity with BMI of 40.0-44.9, adult (HCC)  Patient identified as having weight management issue.  Appropriate orders and counseling given.      8. Screening breast examination  MA-SCREENING MAMMO BILAT W/TOMOSYNTHESIS W/CAD            Followup: Return in about 27 days (around 3/24/2025), or if symptoms worsen or fail to improve, for pap.

## 2025-02-26 ENCOUNTER — HOSPITAL ENCOUNTER (OUTPATIENT)
Dept: LAB | Facility: MEDICAL CENTER | Age: 65
End: 2025-02-26
Attending: FAMILY MEDICINE
Payer: COMMERCIAL

## 2025-02-26 DIAGNOSIS — K21.9 GASTROESOPHAGEAL REFLUX DISEASE WITHOUT ESOPHAGITIS: ICD-10-CM

## 2025-02-26 DIAGNOSIS — I10 ESSENTIAL HYPERTENSION: ICD-10-CM

## 2025-02-26 DIAGNOSIS — E55.9 VITAMIN D DEFICIENCY: ICD-10-CM

## 2025-02-26 DIAGNOSIS — I65.23 CAROTID ATHEROSCLEROSIS, BILATERAL: ICD-10-CM

## 2025-02-26 DIAGNOSIS — E06.3 HYPOTHYROIDISM DUE TO HASHIMOTO THYROIDITIS: ICD-10-CM

## 2025-02-26 LAB
ERYTHROCYTE [DISTWIDTH] IN BLOOD BY AUTOMATED COUNT: 42.1 FL (ref 35.9–50)
HCT VFR BLD AUTO: 42.9 % (ref 37–47)
HGB BLD-MCNC: 13.8 G/DL (ref 12–16)
MCH RBC QN AUTO: 28.3 PG (ref 27–33)
MCHC RBC AUTO-ENTMCNC: 32.2 G/DL (ref 32.2–35.5)
MCV RBC AUTO: 87.9 FL (ref 81.4–97.8)
PLATELET # BLD AUTO: 245 K/UL (ref 164–446)
PMV BLD AUTO: 10.3 FL (ref 9–12.9)
RBC # BLD AUTO: 4.88 M/UL (ref 4.2–5.4)
WBC # BLD AUTO: 6.6 K/UL (ref 4.8–10.8)

## 2025-02-26 PROCEDURE — 82306 VITAMIN D 25 HYDROXY: CPT

## 2025-02-26 PROCEDURE — 36415 COLL VENOUS BLD VENIPUNCTURE: CPT

## 2025-02-26 PROCEDURE — 84443 ASSAY THYROID STIM HORMONE: CPT

## 2025-02-26 PROCEDURE — 80053 COMPREHEN METABOLIC PANEL: CPT

## 2025-02-26 PROCEDURE — 80061 LIPID PANEL: CPT

## 2025-02-26 PROCEDURE — 85027 COMPLETE CBC AUTOMATED: CPT

## 2025-02-27 LAB
25(OH)D3 SERPL-MCNC: 42 NG/ML (ref 30–100)
ALBUMIN SERPL BCP-MCNC: 4 G/DL (ref 3.2–4.9)
ALBUMIN/GLOB SERPL: 1.4 G/DL
ALP SERPL-CCNC: 62 U/L (ref 30–99)
ALT SERPL-CCNC: 18 U/L (ref 2–50)
ANION GAP SERPL CALC-SCNC: 10 MMOL/L (ref 7–16)
AST SERPL-CCNC: 24 U/L (ref 12–45)
BILIRUB SERPL-MCNC: 0.4 MG/DL (ref 0.1–1.5)
BUN SERPL-MCNC: 12 MG/DL (ref 8–22)
CALCIUM ALBUM COR SERPL-MCNC: 9 MG/DL (ref 8.5–10.5)
CALCIUM SERPL-MCNC: 9 MG/DL (ref 8.5–10.5)
CHLORIDE SERPL-SCNC: 105 MMOL/L (ref 96–112)
CHOLEST SERPL-MCNC: 132 MG/DL (ref 100–199)
CO2 SERPL-SCNC: 26 MMOL/L (ref 20–33)
CREAT SERPL-MCNC: 0.69 MG/DL (ref 0.5–1.4)
FASTING STATUS PATIENT QL REPORTED: NORMAL
GFR SERPLBLD CREATININE-BSD FMLA CKD-EPI: 97 ML/MIN/1.73 M 2
GLOBULIN SER CALC-MCNC: 2.8 G/DL (ref 1.9–3.5)
GLUCOSE SERPL-MCNC: 89 MG/DL (ref 65–99)
HDLC SERPL-MCNC: 65 MG/DL
LDLC SERPL CALC-MCNC: 53 MG/DL
POTASSIUM SERPL-SCNC: 4.5 MMOL/L (ref 3.6–5.5)
PROT SERPL-MCNC: 6.8 G/DL (ref 6–8.2)
SODIUM SERPL-SCNC: 141 MMOL/L (ref 135–145)
TRIGL SERPL-MCNC: 68 MG/DL (ref 0–149)
TSH SERPL-ACNC: 4.16 UIU/ML (ref 0.35–5.5)

## 2025-03-03 ENCOUNTER — RESULTS FOLLOW-UP (OUTPATIENT)
Dept: MEDICAL GROUP | Facility: MEDICAL CENTER | Age: 65
End: 2025-03-03

## 2025-03-04 DIAGNOSIS — R45.89 DEPRESSED MOOD: ICD-10-CM

## 2025-03-05 DIAGNOSIS — R45.89 DEPRESSED MOOD: ICD-10-CM

## 2025-03-10 NOTE — Clinical Note
REFERRAL APPROVAL NOTICE         Sent on March 10, 2025                   Fatoumata Redding  [address] [place] [address]                   Dear Ms. Redding,    After a careful review of the medical information and benefit coverage, [facility] has processed your referral. See below for additional details.    If applicable, you must be actively enrolled with your insurance for coverage of the authorized service. If you have any questions regarding your coverage, please contact your insurance directly.    REFERRAL INFORMATION   Referral #:  [id number]  Referred-To Provider    Referred-By Provider:  Behavioral Health    Messi Patel M.D.   [facility]      [address] [place] [address] [address]  [place] [address]  [phone] [id number]  [place] [address]  [phone]    Referral Start Date:  03/04/2025  Referral End Date:   03/04/2026             SCHEDULING  If you do not already have an appointment, please call [phone] to make an appointment.     MORE INFORMATION  If you do not already have a [facility] account, sign up at: [email or web address]  You can access your medical information, make appointments, see lab results, billing information, and more.  If you have questions regarding this referral, please contact  the [facility] Referrals department at:             [phone]. Monday - Friday 8:00AM - 5:00PM.     Sincerely,    [facility]

## 2025-03-17 ENCOUNTER — APPOINTMENT (OUTPATIENT)
Dept: MEDICAL GROUP | Facility: MEDICAL CENTER | Age: 65
End: 2025-03-17
Payer: COMMERCIAL

## 2025-03-22 DIAGNOSIS — I10 ESSENTIAL HYPERTENSION: ICD-10-CM

## 2025-03-22 DIAGNOSIS — E78.5 DYSLIPIDEMIA: ICD-10-CM

## 2025-03-24 ENCOUNTER — OFFICE VISIT (OUTPATIENT)
Dept: MEDICAL GROUP | Facility: MEDICAL CENTER | Age: 65
End: 2025-03-24
Payer: COMMERCIAL

## 2025-03-24 ENCOUNTER — HOSPITAL ENCOUNTER (OUTPATIENT)
Facility: MEDICAL CENTER | Age: 65
End: 2025-03-24
Attending: FAMILY MEDICINE
Payer: COMMERCIAL

## 2025-03-24 VITALS
BODY MASS INDEX: 41.11 KG/M2 | DIASTOLIC BLOOD PRESSURE: 78 MMHG | HEART RATE: 68 BPM | OXYGEN SATURATION: 96 % | TEMPERATURE: 97.5 F | WEIGHT: 232 LBS | HEIGHT: 63 IN | RESPIRATION RATE: 16 BRPM | SYSTOLIC BLOOD PRESSURE: 126 MMHG

## 2025-03-24 DIAGNOSIS — E66.01 MORBID OBESITY WITH BMI OF 40.0-44.9, ADULT (HCC): ICD-10-CM

## 2025-03-24 DIAGNOSIS — Z01.419 WELL WOMAN EXAM WITH ROUTINE GYNECOLOGICAL EXAM: ICD-10-CM

## 2025-03-24 PROCEDURE — 3078F DIAST BP <80 MM HG: CPT | Performed by: FAMILY MEDICINE

## 2025-03-24 PROCEDURE — 88142 CYTOPATH C/V THIN LAYER: CPT

## 2025-03-24 PROCEDURE — 3074F SYST BP LT 130 MM HG: CPT | Performed by: FAMILY MEDICINE

## 2025-03-24 PROCEDURE — 99396 PREV VISIT EST AGE 40-64: CPT | Performed by: FAMILY MEDICINE

## 2025-03-24 PROCEDURE — 87624 HPV HI-RISK TYP POOLED RSLT: CPT

## 2025-03-24 RX ORDER — ROSUVASTATIN CALCIUM 10 MG/1
10 TABLET, COATED ORAL DAILY
Qty: 90 TABLET | Refills: 2 | Status: SHIPPED | OUTPATIENT
Start: 2025-03-24

## 2025-03-24 RX ORDER — LOSARTAN POTASSIUM 50 MG/1
50 TABLET ORAL DAILY
Qty: 90 TABLET | Refills: 2 | Status: SHIPPED | OUTPATIENT
Start: 2025-03-24

## 2025-03-24 SDOH — ECONOMIC STABILITY: INCOME INSECURITY: IN THE LAST 12 MONTHS, WAS THERE A TIME WHEN YOU WERE NOT ABLE TO PAY THE MORTGAGE OR RENT ON TIME?: NO

## 2025-03-24 SDOH — ECONOMIC STABILITY: FOOD INSECURITY: WITHIN THE PAST 12 MONTHS, YOU WORRIED THAT YOUR FOOD WOULD RUN OUT BEFORE YOU GOT MONEY TO BUY MORE.: NEVER TRUE

## 2025-03-24 SDOH — ECONOMIC STABILITY: INCOME INSECURITY: HOW HARD IS IT FOR YOU TO PAY FOR THE VERY BASICS LIKE FOOD, HOUSING, MEDICAL CARE, AND HEATING?: NOT HARD AT ALL

## 2025-03-24 SDOH — HEALTH STABILITY: PHYSICAL HEALTH: ON AVERAGE, HOW MANY DAYS PER WEEK DO YOU ENGAGE IN MODERATE TO STRENUOUS EXERCISE (LIKE A BRISK WALK)?: 5 DAYS

## 2025-03-24 SDOH — ECONOMIC STABILITY: FOOD INSECURITY: WITHIN THE PAST 12 MONTHS, THE FOOD YOU BOUGHT JUST DIDN'T LAST AND YOU DIDN'T HAVE MONEY TO GET MORE.: NEVER TRUE

## 2025-03-24 SDOH — HEALTH STABILITY: PHYSICAL HEALTH: ON AVERAGE, HOW MANY MINUTES DO YOU ENGAGE IN EXERCISE AT THIS LEVEL?: 20 MIN

## 2025-03-24 ASSESSMENT — ENCOUNTER SYMPTOMS
PALPITATIONS: 0
NECK PAIN: 0
BLURRED VISION: 0
SHORTNESS OF BREATH: 0
MYALGIAS: 0
DIZZINESS: 0
VOMITING: 0
WEAKNESS: 0
WHEEZING: 0
ORTHOPNEA: 0
BRUISES/BLEEDS EASILY: 0
TINGLING: 0
HALLUCINATIONS: 0
DOUBLE VISION: 0
BLOOD IN STOOL: 0
SENSORY CHANGE: 0
HEADACHES: 0
ABDOMINAL PAIN: 0
WEIGHT LOSS: 0
DIARRHEA: 0
NERVOUS/ANXIOUS: 0
TREMORS: 0
NAUSEA: 0
CHILLS: 0
COUGH: 0
MEMORY LOSS: 0
FEVER: 0
SPUTUM PRODUCTION: 0
SPEECH CHANGE: 0
INSOMNIA: 0
DEPRESSION: 0
BACK PAIN: 0
SORE THROAT: 0
FOCAL WEAKNESS: 0
HEARTBURN: 0
CONSTIPATION: 0

## 2025-03-24 ASSESSMENT — FIBROSIS 4 INDEX: FIB4 SCORE: 1.454619664155183479

## 2025-03-24 ASSESSMENT — SOCIAL DETERMINANTS OF HEALTH (SDOH)
HOW OFTEN DO YOU ATTEND CHURCH OR RELIGIOUS SERVICES?: 1 TO 4 TIMES PER YEAR
HOW OFTEN DO YOU ATTEND CHURCH OR RELIGIOUS SERVICES?: 1 TO 4 TIMES PER YEAR
IN THE PAST 12 MONTHS, HAS THE ELECTRIC, GAS, OIL, OR WATER COMPANY THREATENED TO SHUT OFF SERVICE IN YOUR HOME?: NO
IN A TYPICAL WEEK, HOW MANY TIMES DO YOU TALK ON THE PHONE WITH FAMILY, FRIENDS, OR NEIGHBORS?: MORE THAN THREE TIMES A WEEK
WITHIN THE PAST 12 MONTHS, YOU WORRIED THAT YOUR FOOD WOULD RUN OUT BEFORE YOU GOT THE MONEY TO BUY MORE: NEVER TRUE
DO YOU BELONG TO ANY CLUBS OR ORGANIZATIONS SUCH AS CHURCH GROUPS UNIONS, FRATERNAL OR ATHLETIC GROUPS, OR SCHOOL GROUPS?: YES
HOW HARD IS IT FOR YOU TO PAY FOR THE VERY BASICS LIKE FOOD, HOUSING, MEDICAL CARE, AND HEATING?: NOT HARD AT ALL
HOW OFTEN DO YOU ATTENT MEETINGS OF THE CLUB OR ORGANIZATION YOU BELONG TO?: MORE THAN 4 TIMES PER YEAR
HOW MANY DRINKS CONTAINING ALCOHOL DO YOU HAVE ON A TYPICAL DAY WHEN YOU ARE DRINKING: 1 OR 2
IN A TYPICAL WEEK, HOW MANY TIMES DO YOU TALK ON THE PHONE WITH FAMILY, FRIENDS, OR NEIGHBORS?: MORE THAN THREE TIMES A WEEK
HOW OFTEN DO YOU GET TOGETHER WITH FRIENDS OR RELATIVES?: ONCE A WEEK
HOW OFTEN DO YOU HAVE A DRINK CONTAINING ALCOHOL: 2-4 TIMES A MONTH
HOW OFTEN DO YOU ATTENT MEETINGS OF THE CLUB OR ORGANIZATION YOU BELONG TO?: MORE THAN 4 TIMES PER YEAR
DO YOU BELONG TO ANY CLUBS OR ORGANIZATIONS SUCH AS CHURCH GROUPS UNIONS, FRATERNAL OR ATHLETIC GROUPS, OR SCHOOL GROUPS?: YES
HOW OFTEN DO YOU GET TOGETHER WITH FRIENDS OR RELATIVES?: ONCE A WEEK
HOW OFTEN DO YOU HAVE SIX OR MORE DRINKS ON ONE OCCASION: NEVER

## 2025-03-24 ASSESSMENT — LIFESTYLE VARIABLES
HOW MANY STANDARD DRINKS CONTAINING ALCOHOL DO YOU HAVE ON A TYPICAL DAY: 1 OR 2
AUDIT-C TOTAL SCORE: 2
HOW OFTEN DO YOU HAVE SIX OR MORE DRINKS ON ONE OCCASION: NEVER
SUBSTANCE_ABUSE: 0
SKIP TO QUESTIONS 9-10: 1
HOW OFTEN DO YOU HAVE A DRINK CONTAINING ALCOHOL: 2-4 TIMES A MONTH

## 2025-03-24 NOTE — PROGRESS NOTES
Subjective     Fatoumata Redding is a 65 y.o. female who presents with Annual Exam and Gynecologic Exam        She is here for her well woman examination with pap smear.    HPI     has a past medical history of Anesthesia (started 2009), Bilateral carotid artery stenosis (04/21/2022), BRCA gene mutation negative (07/13/2022), Carotid atherosclerosis, bilateral (04/21/2022), Claustrophobia, Hashimoto's thyroiditis (04/21/2022), Hyperlipidemia, Hypertension, Pain (12/13/2022), palpitations, PONV (postoperative nausea and vomiting), Snoring, Stroke (HCC) (TIA), Thyroid disease, and TIA (transient ischemic attack).   has a past surgical history that includes abdominal exploration (2013); abdominal exploration (2011); cholecystectomy; primary c section; cervical disk and fusion anterior (2004); meniscectomy, knee, arthroscopic (Right, 12/23/2022); pr total knee arthroplasty (Right, 10/12/2023); and orif, knee (12/23/22).  family history includes Cancer in her paternal grandfather and sister; Cancer (age of onset: 55) in her sister; Cancer (age of onset: 74) in her father; Cancer (age of onset: 79) in her mother; Diabetes in her maternal grandmother and sister; Stroke (age of onset: 70) in her father.   reports that she has never smoked. She has never used smokeless tobacco. She reports current alcohol use of about 1.2 oz of alcohol per week. She reports that she does not use drugs.      Current Outpatient Medications:     losartan (COZAAR) 50 MG Tab, TAKE ONE TABLET BY MOUTH ONE TIME DAILY, Disp: 90 Tablet, Rfl: 2    rosuvastatin (CRESTOR) 10 MG Tab, TAKE ONE TABLET BY MOUTH ONE TIME DAILY, Disp: 90 Tablet, Rfl: 2    Cholecalciferol (D3 ADULT PO), Take 2,000 Units by mouth every day., Disp: , Rfl:     pantoprazole (PROTONIX) 20 MG tablet, Take 1 Tablet by mouth every day., Disp: 90 Tablet, Rfl: 3    levothyroxine (SYNTHROID) 175 MCG Tab, TAKE 1 TABLET BY MOUTH EVERY MORNING ON AN EMPTY STOMACH., Disp: 90 Tablet, Rfl: 0     "aspirin EC (ECOTRIN) 81 MG Tablet Delayed Response, Take 1 Tablet by mouth every day., Disp: 30 Tablet, Rfl: 11  is allergic to prednisone, promethazine, tape, and latex.    Review of Systems   Constitutional:  Negative for chills, fever, malaise/fatigue and weight loss.   HENT:  Negative for congestion, hearing loss and sore throat.    Eyes:  Negative for blurred vision and double vision.   Respiratory:  Negative for cough, sputum production, shortness of breath and wheezing.    Cardiovascular:  Negative for chest pain, palpitations, orthopnea and leg swelling.   Gastrointestinal:  Negative for abdominal pain, blood in stool, constipation, diarrhea, heartburn, nausea and vomiting.   Genitourinary:  Negative for dysuria, frequency, hematuria and urgency.   Musculoskeletal:  Negative for back pain, joint pain, myalgias and neck pain.   Skin:  Negative for rash.   Neurological:  Negative for dizziness, tingling, tremors, sensory change, speech change, focal weakness, weakness and headaches.   Endo/Heme/Allergies:  Negative for environmental allergies. Does not bruise/bleed easily.   Psychiatric/Behavioral:  Negative for depression, hallucinations, memory loss, substance abuse and suicidal ideas. The patient is not nervous/anxious and does not have insomnia.           Objective     /78   Pulse 68   Temp 36.4 °C (97.5 °F) (Temporal)   Resp 16   Ht 1.6 m (5' 3\")   Wt 105 kg (232 lb)   LMP  (LMP Unknown)   SpO2 96%   BMI 41.10 kg/m²      Physical Exam  Vitals reviewed.   Constitutional:       General: She is not in acute distress.     Appearance: She is well-developed.   HENT:      Head: Normocephalic and atraumatic.   Eyes:      General:         Left eye: No discharge.      Pupils: Pupils are equal, round, and reactive to light.   Neck:      Thyroid: No thyromegaly.      Vascular: No JVD.   Cardiovascular:      Rate and Rhythm: Normal rate and regular rhythm.      Heart sounds: Normal heart sounds. No " murmur heard.  Pulmonary:      Effort: Pulmonary effort is normal. No respiratory distress.      Breath sounds: Normal breath sounds. No wheezing or rales.   Chest:   Breasts:     Breasts are symmetrical.      Right: No inverted nipple, mass, nipple discharge, skin change or tenderness.      Left: No inverted nipple, mass, nipple discharge, skin change or tenderness.   Abdominal:      General: Bowel sounds are normal. There is no distension.      Palpations: Abdomen is soft. There is no mass.      Tenderness: There is no abdominal tenderness.   Genitourinary:     Vagina: Normal. No vaginal discharge.      Cervix: No discharge.      Adnexa:         Right: No mass.          Left: No mass.        Comments: Pap smear (brush and spatula) of cervix taken and sent  Musculoskeletal:         General: Normal range of motion.      Cervical back: Normal range of motion and neck supple.   Lymphadenopathy:      Cervical: No cervical adenopathy.   Skin:     General: Skin is warm and dry.      Findings: No erythema or rash.   Neurological:      General: No focal deficit present.      Mental Status: She is alert and oriented to person, place, and time.      Coordination: Coordination normal.   Psychiatric:         Mood and Affect: Mood normal.         Behavior: Behavior normal.                Anticipatory guidance:  seatbelt worn.  Immunizations up to date.  Mammogram is scheduled.  Colonoscopy due 7/2026  Assessment & Plan  Well woman exam with routine gynecological exam    Orders:    THINPREP PAP WITH HPV; Future    Morbid obesity with BMI of 40.0-44.9, adult (HCC)  Discussed, working on high vegetable diet.  Protein substitutes one meal.  Congratulated.        Recheck one year, sooner as needed

## 2025-03-24 NOTE — TELEPHONE ENCOUNTER
Received request via: Pharmacy    Was the patient seen in the last year in this department? Yes    Does the patient have an active prescription (recently filled or refills available) for medication(s) requested? No    Pharmacy Name: : [facility] - [place], [place] - [id number]     Does the patient have skilled nursing Plus and need 100-day supply? (This applies to ALL medications) Patient does not have SCP

## 2025-03-26 ENCOUNTER — HOSPITAL ENCOUNTER (OUTPATIENT)
Dept: RADIOLOGY | Facility: MEDICAL CENTER | Age: 65
End: 2025-03-26
Attending: FAMILY MEDICINE
Payer: COMMERCIAL

## 2025-03-26 DIAGNOSIS — R22.1 NECK MASS: ICD-10-CM

## 2025-03-26 DIAGNOSIS — E06.3 HYPOTHYROIDISM DUE TO HASHIMOTO'S THYROIDITIS: ICD-10-CM

## 2025-03-26 PROCEDURE — 76536 US EXAM OF HEAD AND NECK: CPT

## 2025-03-26 RX ORDER — LEVOTHYROXINE SODIUM 175 UG/1
175 TABLET ORAL
Qty: 90 TABLET | Refills: 3 | Status: SHIPPED | OUTPATIENT
Start: 2025-03-26

## 2025-03-28 ENCOUNTER — RESULTS FOLLOW-UP (OUTPATIENT)
Dept: MEDICAL GROUP | Facility: MEDICAL CENTER | Age: 65
End: 2025-03-28

## 2025-03-28 LAB
HPV I/H RISK 1 DNA SPEC QL NAA+PROBE: NOT DETECTED
SPECIMEN SOURCE: NORMAL
THINPREP PAP, CYTOLOGY NL11781: NORMAL

## 2025-04-17 ENCOUNTER — HOSPITAL ENCOUNTER (OUTPATIENT)
Dept: RADIOLOGY | Facility: MEDICAL CENTER | Age: 65
End: 2025-04-17
Attending: FAMILY MEDICINE
Payer: COMMERCIAL

## 2025-04-17 DIAGNOSIS — Z12.39 SCREENING BREAST EXAMINATION: ICD-10-CM

## 2025-04-17 PROCEDURE — 77067 SCR MAMMO BI INCL CAD: CPT

## 2025-07-17 ENCOUNTER — PATIENT MESSAGE (OUTPATIENT)
Dept: MEDICAL GROUP | Facility: MEDICAL CENTER | Age: 65
End: 2025-07-17
Payer: COMMERCIAL

## 2025-07-17 DIAGNOSIS — E66.812 OBESITY, CLASS II, BMI 35-39.9: Primary | ICD-10-CM

## 2025-07-17 NOTE — PROGRESS NOTES
Patient has been trying dietary change and exercise to achieve weight loss.  She is interested in discussing and possibly using Jardiance or GLP-1 treatment.  She agrees to a pharmacotherapy referral.  Referral placed.

## 2025-07-21 DIAGNOSIS — R31.9: Primary | ICD-10-CM

## 2025-07-22 ENCOUNTER — APPOINTMENT (OUTPATIENT)
Dept: LAB | Facility: MEDICAL CENTER | Age: 65
End: 2025-07-22
Payer: COMMERCIAL

## 2025-07-22 ENCOUNTER — HOSPITAL ENCOUNTER (OUTPATIENT)
Facility: MEDICAL CENTER | Age: 65
End: 2025-07-22
Attending: FAMILY MEDICINE
Payer: COMMERCIAL

## 2025-07-22 DIAGNOSIS — R31.9: ICD-10-CM

## 2025-07-22 LAB
APPEARANCE UR: CLEAR
BACTERIA #/AREA URNS HPF: NORMAL /HPF
BILIRUB UR QL STRIP.AUTO: NEGATIVE
CASTS URNS QL MICRO: NORMAL /LPF (ref 0–2)
COLOR UR: YELLOW
EPITHELIAL CELLS 1715: NORMAL /HPF (ref 0–5)
GLUCOSE UR STRIP.AUTO-MCNC: NEGATIVE MG/DL
KETONES UR STRIP.AUTO-MCNC: NEGATIVE MG/DL
LEUKOCYTE ESTERASE UR QL STRIP.AUTO: ABNORMAL
MICRO URNS: ABNORMAL
NITRITE UR QL STRIP.AUTO: NEGATIVE
PH UR STRIP.AUTO: 5.5 [PH] (ref 5–8)
PROT UR QL STRIP: NEGATIVE MG/DL
RBC # URNS HPF: NORMAL /HPF (ref 0–2)
RBC UR QL AUTO: NEGATIVE
SP GR UR STRIP.AUTO: 1.02
UROBILINOGEN UR STRIP.AUTO-MCNC: 0.2 EU/DL
WBC #/AREA URNS HPF: NORMAL /HPF

## 2025-07-22 PROCEDURE — 87086 URINE CULTURE/COLONY COUNT: CPT

## 2025-07-22 PROCEDURE — 81001 URINALYSIS AUTO W/SCOPE: CPT

## 2025-07-24 ENCOUNTER — OFFICE VISIT (OUTPATIENT)
Dept: VASCULAR LAB | Facility: MEDICAL CENTER | Age: 65
End: 2025-07-24
Attending: INTERNAL MEDICINE
Payer: COMMERCIAL

## 2025-07-24 VITALS — HEIGHT: 63 IN | WEIGHT: 231.4 LBS | BODY MASS INDEX: 41 KG/M2

## 2025-07-24 DIAGNOSIS — E66.813 CLASS 3 SEVERE OBESITY WITH SERIOUS COMORBIDITY AND BODY MASS INDEX (BMI) OF 40.0 TO 44.9 IN ADULT, UNSPECIFIED OBESITY TYPE: Primary | ICD-10-CM

## 2025-07-24 PROCEDURE — 99213 OFFICE O/P EST LOW 20 MIN: CPT

## 2025-07-24 RX ORDER — TIRZEPATIDE 2.5 MG/.5ML
2.5 INJECTION, SOLUTION SUBCUTANEOUS
Qty: 2 ML | Refills: 1 | Status: SHIPPED | OUTPATIENT
Start: 2025-07-24 | End: 2025-07-31

## 2025-07-24 ASSESSMENT — FIBROSIS 4 INDEX: FIB4 SCORE: 1.454619664155183479

## 2025-07-24 NOTE — PROGRESS NOTES
Patient Consult Note    Primary care physician: Messi Patel M.D.    Reason for consult: Obesity/Weight Management    Date Referral Placed: 07/17/25    HPI:  Fatoumata Redding is a 65 y.o. old patient who comes in today for evaluation of above stated problem.    Initial Weight: 231.4 lbs  Initial BMI: 40.99 kg/m2    Most Recent HbA1c:   Lab Results   Component Value Date/Time    HBA1C 5.5 09/27/2023 01:21 PM     Most Recent TSH:   TSH   Date Value Ref Range Status   02/26/2025 4.160 0.350 - 5.500 uIU/mL Final     Most Recent SrCr and GFR:  Lab Results   Component Value Date/Time    CREATININE 0.69 02/26/2025 07:41 AM      GFR (CKD-EPI)   Date Value Ref Range Status   02/26/2025 97 >60 mL/min/1.73 m 2 Final     Comment:     Estimated Glomerular Filtration Rate is calculated using  race neutral CKD-EPI 2021 equation per NKF-ASN recommendations.         Current Obesity Medication Regimen  None    Previous Obesity Medication(s) and Reason for Discontinuation  None    Lifestyle  Physical Activity:  Current Exercise - Tries to walk a mile twice daily  Exercise Goal - At least 150 min/week of anything aerobic.    Dietary: Eats 3 meals per day, fasts from 4pm to 10am. States her daughter is a nutritionist.  Breakfast (10am)- cereal, scrambled eggs, yogurt, occasional toast/english muffin, black coffee  Lunch - salad with protein  Dinner (4pm)- balanced dinner; protein/salad/occasional starch  Snack - none  Dessert - occasional jello, cheesecake but monitors her portions  Beverage - water      Past Medical History:  Patient Active Problem List    Diagnosis Date Noted    Osteoarthritis of right knee 09/07/2023    Postural dizziness with presyncope 03/09/2023    Sigmoid diverticulitis 03/08/2023    Heart murmur 03/08/2023    Medial meniscus tear 12/02/2022    History of TIA (transient ischemic attack) 07/13/2022    BRCA gene mutation negative 07/13/2022    Primary osteoarthritis of right knee 07/13/2022    Plantar fasciitis,  left 07/13/2022    Heberden's nodes of right hand 07/13/2022    Carotid atherosclerosis, bilateral 04/21/2022    Bilateral carotid artery stenosis 04/21/2022    Hashimoto's thyroiditis 04/21/2022    Hypothyroid 09/30/2019    History of gastric surgery 09/30/2019    Vitamin D deficiency 09/30/2019    Family history of breast cancer in sister 09/30/2019    Esophageal reflux 08/17/2011    Essential hypertension 08/17/2011       Past Surgical History:  Past Surgical History[1]    Allergies:  Prednisone, Promethazine, Tape, and Latex    Social History:  Social History     Socioeconomic History    Marital status:      Spouse name: Not on file    Number of children: Not on file    Years of education: Not on file    Highest education level: Some college, no degree   Occupational History    Occupation: homemaker   Tobacco Use    Smoking status: Never    Smokeless tobacco: Never   Vaping Use    Vaping status: Never Used   Substance and Sexual Activity    Alcohol use: Yes     Alcohol/week: 1.2 oz     Types: 2 Glasses of wine per week     Comment: once in awhile    Drug use: Never    Sexual activity: Yes     Partners: Male     Birth control/protection: None     Comment: Menopaused at 52 years old   Other Topics Concern    Not on file   Social History Narrative    Not on file     Social Drivers of Health     Financial Resource Strain: Low Risk  (3/24/2025)    Overall Financial Resource Strain (CARDIA)     Difficulty of Paying Living Expenses: Not hard at all   Food Insecurity: No Food Insecurity (3/24/2025)    Hunger Vital Sign     Worried About Running Out of Food in the Last Year: Never true     Ran Out of Food in the Last Year: Never true   Transportation Needs: No Transportation Needs (3/24/2025)    PRAPARE - Transportation     Lack of Transportation (Medical): No     Lack of Transportation (Non-Medical): No   Physical Activity: Insufficiently Active (3/24/2025)    Exercise Vital Sign     Days of Exercise per Week: 5  "days     Minutes of Exercise per Session: 20 min   Stress: No Stress Concern Present (3/24/2025)    [employer] - Occupational Stress Questionnaire     Feeling of Stress : Not at all   Social Connections: Socially Integrated (3/24/2025)    Social Connection and Isolation Panel [NHANES]     Frequency of Communication with Friends and Family: More than three times a week     Frequency of Social Gatherings with Friends and Family: Once a week     Attends Rastafari Services: 1 to 4 times per year     Active Member of Clubs or Organizations: Yes     Attends Club or Organization Meetings: More than 4 times per year     Marital Status:    Intimate Partner Violence: Not on file   Housing Stability: Low Risk  (3/24/2025)    Housing Stability Vital Sign     Unable to Pay for Housing in the Last Year: No     Number of Times Moved in the Last Year: 0     Homeless in the Last Year: No       Family History:  Family History   Problem Relation Age of Onset    Cancer Mother 79        PassedOvarian Cancer    Cancer Father 74        Passed Lung Cancer    Stroke Father 70        passed at 74    Cancer Sister 55        Passed away Breast Cancer    Cancer Sister         Living    Diabetes Sister     Diabetes Maternal Grandmother     Cancer Paternal Grandfather         lung Ca smoker    Heart Disease Neg Hx        Medications:  Current Medications[2]    Physical Examination:  Vital signs: Ht 1.6 m (5' 3\")   Wt 105 kg (231 lb 6.4 oz)   LMP  (LMP Unknown)   BMI 40.99 kg/m²      Assessment and Plan:    1. Obesity/Weight Management  Pt with BMI > 40 and is classified with Obesity Class III  She has been practicing some lifestyle modifications that she is motivated to continue but she is interested in initiating a weight loss medication to add with further weight loss  Pt would be a good candidate for GLP1ra therapy given elevated BMI and PMH. Of note, pt with hx of TIA, bilateral carotid artery stenosis, " and HTN, and would also benefit from GLP1ra to decrease risk for ASCVD events.  Provided education on Wegovy and Zepbound, pt would like to initiate Zepbound if covered by insurance.  Of note, she is agreeable to Ozempic 340b pricing if weight loss medications are not covered by her insurance.    - Medication changes:  Start Zepbound 2.5 mg weekly   - Continue:  None     - Lifestyle changes:  Diet: Monitor caloric intake - aim for deficit. Maximize lean proteins and veggies. Cut out/down on carbs. Avoid simple sugars.   Referral to Nutrition Services placed: No   Exercise: Increase as tolerated. Goal of at least 150 min/week of anything aerobic.    Follow Up:  Return in about 6 weeks (around 9/4/2025).    Juni rBock, EliasD    CC:   Messi Patel M.D.          [facility] Pharmacotherapy Program Consent      Name    Fatoumata Redding    MRN number: [id number]    the following are guidelines for participation in the [facility] Pharmacotherapy Program.     I, ____Fatoumata Redding_____, understand and voluntarily agree to participate in the [facility] Pharmacotherapy Program and to have services provided to me by pharmacists working in collaboration with my provider.    I understand the pharmacist within the [facility] Pharmacotherapy Program may initiate, modify or discontinue my medications, order appropriate testing and appointments, perform exams, monitor treatment, and make clinical evaluations and decisions pursuant to a collaborative practice agreement with my provider.  I understand the pharmacist within the [facility] Pharmacotherapy Program is not a physician, osteopathic physician, advanced practice registered nurse or physician assistant and may not diagnose.  I will take all my medications as instructed and not change the way I take it without first talking to my provider or a pharmacist within the [facility] Pharmacotherapy Program.  I understand that if I am late to my appointment I  may not be able to be seen by a pharmacist at that time and will have to reschedule my appointment.  During appointment with pharmacist I understand that pharmacist has the right not to answer questions or perform services outside the pharmacist’s scope of practice.  By signing below, I provide informed consent for the pharmacist to provide these services and for my participation in the [facility] Pharmacotherapy Program.      Fatoumata Redding           [id number]          07/24/25  Patient Name                   MRN number  Date     ____Obtained verbal consent from Fatoumata Redding____  Patient Signature         [1]   Past Surgical History:  Procedure Laterality Date    PB TOTAL KNEE ARTHROPLASTY Right 10/12/2023    Procedure: ARTHROPLASTY, KNEE, TOTAL;  Surgeon: Oscar Oliver M.D.;  Location: [facility];  Service: Orthopedics    MENISCECTOMY, KNEE, ARTHROSCOPIC Right 12/23/2022    Procedure: Right knee arthroscopy, partial medial menisectomy.;  Surgeon: Oscar Oliver M.D.;  Location: SURGERY [facility];  Service: Orthopedics    ABDOMINAL EXPLORATION  2013    gastric bypass    ABDOMINAL EXPLORATION  2011    lap band    CERVICAL DISK AND FUSION ANTERIOR  2004    CHOLECYSTECTOMY      ORIF, KNEE  12/23/22    torn Meniscus    PRIMARY C SECTION      c/s x 2   [2]   Current Outpatient Medications:     Tirzepatide-Weight Management (ZEPBOUND) 2.5 MG/0.5ML Solution Auto-injector, Inject 2.5 mg under the skin every 7 days., Disp: 2 mL, Rfl: 1    levothyroxine (SYNTHROID) 175 MCG Tab, Take 1 Tablet by mouth every morning on an empty stomach., Disp: 90 Tablet, Rfl: 3    losartan (COZAAR) 50 MG Tab, TAKE ONE TABLET BY MOUTH ONE TIME DAILY, Disp: 90 Tablet, Rfl: 2    rosuvastatin (CRESTOR) 10 MG Tab, TAKE ONE TABLET BY MOUTH ONE TIME DAILY, Disp: 90 Tablet, Rfl: 2    Cholecalciferol (D3 ADULT PO), Take 2,000 Units by mouth every day., Disp: , Rfl:     pantoprazole (PROTONIX) 20 MG tablet, Take  1 Tablet by mouth every day., Disp: 90 Tablet, Rfl: 3    aspirin EC (ECOTRIN) 81 MG Tablet Delayed Response, Take 1 Tablet by mouth every day., Disp: 30 Tablet, Rfl: 11

## 2025-07-24 NOTE — Clinical Note
Hi, could you please let me know if Zepbound would be covered by insurance or if we need to switch to something else? She would like to fill at [facility]. If all weight loss drugs are excluded from plan, she is agreeable to CovacsisempDigitalMR 340b through [employer]. Thanks!

## 2025-07-25 LAB
BACTERIA UR CULT: NORMAL
SIGNIFICANT IND 70042: NORMAL
SITE SITE: NORMAL
SOURCE SOURCE: NORMAL

## 2025-07-28 ENCOUNTER — HOSPITAL ENCOUNTER (OUTPATIENT)
Dept: LAB | Facility: MEDICAL CENTER | Age: 65
End: 2025-07-28
Attending: STUDENT IN AN ORGANIZED HEALTH CARE EDUCATION/TRAINING PROGRAM
Payer: COMMERCIAL

## 2025-07-28 ENCOUNTER — OFFICE VISIT (OUTPATIENT)
Dept: MEDICAL GROUP | Facility: MEDICAL CENTER | Age: 65
End: 2025-07-28
Payer: COMMERCIAL

## 2025-07-28 VITALS
HEART RATE: 71 BPM | DIASTOLIC BLOOD PRESSURE: 68 MMHG | SYSTOLIC BLOOD PRESSURE: 122 MMHG | TEMPERATURE: 97.5 F | RESPIRATION RATE: 20 BRPM | HEIGHT: 63 IN | WEIGHT: 229.4 LBS | OXYGEN SATURATION: 99 % | BODY MASS INDEX: 40.64 KG/M2

## 2025-07-28 DIAGNOSIS — K92.1 BLOODY STOOL: Primary | ICD-10-CM

## 2025-07-28 DIAGNOSIS — Z86.73 HISTORY OF TIA (TRANSIENT ISCHEMIC ATTACK): ICD-10-CM

## 2025-07-28 DIAGNOSIS — K92.1 BLOODY STOOL: ICD-10-CM

## 2025-07-28 DIAGNOSIS — R82.998 RED-COLORED URINE: ICD-10-CM

## 2025-07-28 LAB
ALBUMIN SERPL BCP-MCNC: 4.2 G/DL (ref 3.2–4.9)
ALBUMIN/GLOB SERPL: 1.5 G/DL
ALP SERPL-CCNC: 66 U/L (ref 30–99)
ALT SERPL-CCNC: 16 U/L (ref 2–50)
ANION GAP SERPL CALC-SCNC: 12 MMOL/L (ref 7–16)
AST SERPL-CCNC: 23 U/L (ref 12–45)
BILIRUB SERPL-MCNC: 0.2 MG/DL (ref 0.1–1.5)
BUN SERPL-MCNC: 12 MG/DL (ref 8–22)
CALCIUM ALBUM COR SERPL-MCNC: 9.3 MG/DL (ref 8.5–10.5)
CALCIUM SERPL-MCNC: 9.5 MG/DL (ref 8.5–10.5)
CHLORIDE SERPL-SCNC: 105 MMOL/L (ref 96–112)
CO2 SERPL-SCNC: 24 MMOL/L (ref 20–33)
CREAT SERPL-MCNC: 0.65 MG/DL (ref 0.5–1.4)
ERYTHROCYTE [DISTWIDTH] IN BLOOD BY AUTOMATED COUNT: 43.8 FL (ref 35.9–50)
GFR SERPLBLD CREATININE-BSD FMLA CKD-EPI: 99 ML/MIN/1.73 M 2
GLOBULIN SER CALC-MCNC: 2.8 G/DL (ref 1.9–3.5)
GLUCOSE SERPL-MCNC: 95 MG/DL (ref 65–99)
HCT VFR BLD AUTO: 44.4 % (ref 37–47)
HGB BLD-MCNC: 14.3 G/DL (ref 12–16)
MCH RBC QN AUTO: 28.6 PG (ref 27–33)
MCHC RBC AUTO-ENTMCNC: 32.2 G/DL (ref 32.2–35.5)
MCV RBC AUTO: 88.8 FL (ref 81.4–97.8)
PLATELET # BLD AUTO: 254 K/UL (ref 164–446)
PMV BLD AUTO: 11.3 FL (ref 9–12.9)
POTASSIUM SERPL-SCNC: 4.3 MMOL/L (ref 3.6–5.5)
PROT SERPL-MCNC: 7 G/DL (ref 6–8.2)
RBC # BLD AUTO: 5 M/UL (ref 4.2–5.4)
SODIUM SERPL-SCNC: 141 MMOL/L (ref 135–145)
WBC # BLD AUTO: 6.7 K/UL (ref 4.8–10.8)

## 2025-07-28 PROCEDURE — 3078F DIAST BP <80 MM HG: CPT | Performed by: STUDENT IN AN ORGANIZED HEALTH CARE EDUCATION/TRAINING PROGRAM

## 2025-07-28 PROCEDURE — 3074F SYST BP LT 130 MM HG: CPT | Performed by: STUDENT IN AN ORGANIZED HEALTH CARE EDUCATION/TRAINING PROGRAM

## 2025-07-28 PROCEDURE — 99213 OFFICE O/P EST LOW 20 MIN: CPT | Performed by: STUDENT IN AN ORGANIZED HEALTH CARE EDUCATION/TRAINING PROGRAM

## 2025-07-28 PROCEDURE — 80053 COMPREHEN METABOLIC PANEL: CPT

## 2025-07-28 PROCEDURE — 85027 COMPLETE CBC AUTOMATED: CPT

## 2025-07-28 PROCEDURE — 36415 COLL VENOUS BLD VENIPUNCTURE: CPT

## 2025-07-28 ASSESSMENT — ENCOUNTER SYMPTOMS
WHEEZING: 0
VOMITING: 0
CHILLS: 0
DIZZINESS: 0
HEADACHES: 0
SHORTNESS OF BREATH: 0
NAUSEA: 0
WEIGHT LOSS: 0
FEVER: 0
PALPITATIONS: 0

## 2025-07-28 ASSESSMENT — FIBROSIS 4 INDEX: FIB4 SCORE: 1.454619664155183479

## 2025-07-28 NOTE — PROGRESS NOTES
Subjective:     CC: bleeding    HPI:   Fatoumata presents today with    Patient of Messi Patel M.D.   PMH HTN, HLD, hypothyroidism, GERD, hx of TIA     Verbal consent was acquired by the patient to use [employer] ambient listening note generation during this visit Yes   History of Present Illness  The patient presents for evaluation of blood in her bowel movements and urine.    She has been taking a probiotic, Biome, for the past 2.5 months. A week ago, lab work done by Dr. Fontenot showed no significant issues. However, she has noticed blood in her bowel movements and urine. Dr. Fontenot expressed concern and advised her to seek further medical attention as she is on vacation. She reports seeing a small amount of blood in her stool during bowel movements but does not experience any pain. She has never had bloody bowel movements before starting the probiotic. Regular bowel movements continue without pain. There is no history of hemorrhoids, either internal or external. Her colonoscopy revealed polyps, which were not deemed serious. Fiber intake is believed to be adequate, though she is considering increasing it. She stopped taking the probiotic 1.5 weeks ago due to concerns it might be causing the bleeding. The bleeding is not severe and is only present in the stool, not when wiping. She does not strain during bowel movements. A history of diverticulitis raises curiosity if this could be contributing to her symptoms. Her last colonoscopy and Pap smear were normal.    She first noticed red urine about 2 weeks ago and subsequently stopped taking her vitamin supplements, suspecting they might be the cause. The redness in her urine is less pronounced than in her bowel movements. No urinary symptoms are reported.    She is currently on aspirin due to a TIA in 2022.    FAMILY HISTORY  Her mother had ovarian cancer and passed away from it.      Health Maintenance:     ROS:  Review of Systems   Constitutional:  Negative for  "chills, fever and weight loss.   HENT:  Negative for hearing loss.    Respiratory:  Negative for shortness of breath and wheezing.    Cardiovascular:  Negative for chest pain and palpitations.   Gastrointestinal:  Negative for nausea and vomiting.   Genitourinary:  Negative for frequency and urgency.   Skin:  Negative for rash.   Neurological:  Negative for dizziness and headaches.       Objective:     Exam:  /68 (BP Location: Left arm, Patient Position: Sitting, BP Cuff Size: Adult)   Pulse 71   Temp 36.4 °C (97.5 °F) (Temporal)   Resp 20   Ht 1.6 m (5' 3\") Comment: pt reported  Wt 104 kg (229 lb 6.4 oz)   LMP  (LMP Unknown)   SpO2 99%   BMI 40.64 kg/m²  Body mass index is 40.64 kg/m².    Physical Exam  Constitutional:       Appearance: Normal appearance.   Musculoskeletal:      Cervical back: Normal range of motion and neck supple.   Neurological:      Mental Status: She is alert.   Psychiatric:         Mood and Affect: Mood normal.         Behavior: Behavior normal.           Labs:     Assessment & Plan:     65 y.o. female with the following -     1. Red-colored urine  UA without evidence of blood/ RBC / or proteins  0-2 RBC, no bacteria noted, patient asymptomatic     2. Bloody stool (Primary)  3. Hx of TIA  Subacute undiagnosed, unknown etiology   Up to date with colonoscopy, 3 year recall for high risk   Hx of internal hemorrhoid noted on colonoscopy  On ASA for hx of TIA  Vital stable  She report noticing blood with her bowel movements.   Plan  Conservative management, if symptoms persist follow up and consider referral to GI   Patient has some concern about this being atypical presentation of ovarian cancer.       Return in about 6 weeks (around 9/8/2025) for Lab review, Med check.    Please note that this dictation was created using voice recognition software. I have made every reasonable attempt to correct obvious errors, but I expect that there are errors of grammar and possibly content that " I did not discover before finalizing the note.

## 2025-07-29 ENCOUNTER — PATIENT MESSAGE (OUTPATIENT)
Dept: VASCULAR LAB | Facility: MEDICAL CENTER | Age: 65
End: 2025-07-29
Payer: COMMERCIAL

## 2025-07-29 ENCOUNTER — TELEPHONE (OUTPATIENT)
Dept: VASCULAR LAB | Facility: MEDICAL CENTER | Age: 65
End: 2025-07-29
Payer: COMMERCIAL

## 2025-07-29 DIAGNOSIS — E66.813 CLASS 3 SEVERE OBESITY WITH SERIOUS COMORBIDITY AND BODY MASS INDEX (BMI) OF 40.0 TO 44.9 IN ADULT, UNSPECIFIED OBESITY TYPE: Primary | ICD-10-CM

## 2025-07-29 NOTE — TELEPHONE ENCOUNTER
Received New Start request via EMR  for   Tirzepatide-Weight Management (ZEPBOUND) 2.5 MG/0.5ML Solution Auto-injector. (Quantity:2 mls, Day Supply:28)     Insurance: BIANCA   Member ID:  [id number]  BIN: [id number]  PCN: [id number]  Group: [id number]     Ran Test claim via [place] & medication Rejects stating prior authorization is required.    ROGELIO Castillo, PhT  Vascular Pharmacy Liaison (Rx Coordinator)  P: [phone]  7/29/2025 4:11 PM

## 2025-07-30 NOTE — TELEPHONE ENCOUNTER
New PA submitted for Tirzepatide-Weight Management (ZEPBOUND) 2.5 MG/0.5ML Solution Auto-injector.  has been denied for a quantity of 2 mls , day supply 28    Prior authorization was denied per the following: *Drug Not Covered/Plan Exclusion - Your request for coverage was denied because your  prescription benefit plan does not cover the requested medication.  This decision relates specifically to coverage provided under your prescription benefit plan and does not involve any determination of medical judgment.      PA denial reference number: [id number]/A  Insurance: AETNA      Next Steps: proceed in calling the pt and offer drug alternatives and its copay price available thru [facility] specialty pricing. Pt agreeable with Ozempic specialty pricing if requested medication is not covered by plan.     ROGELIO Castillo, PhT  Vascular Pharmacy Liaison (Rx Coordinator)  P: [phone]  7/29/2025 6:53 PM

## 2025-07-30 NOTE — TELEPHONE ENCOUNTER
Prior Authorization for Tirzepatide-Weight Management (ZEPBOUND) 2.5 MG/0.5ML Solution Auto-injector.  (Quantity: 2 mls, Days: 28) has been submitted via Cover My Meds: Key ([id number])    Insurance: AETNA     Will follow up in 24-48 business hours.     ROGELIO Castillo, PhT  Vascular Pharmacy Liaison (Rx Coordinator)  P: [phone]  7/29/2025 6:31 PM

## 2025-07-31 ENCOUNTER — TELEPHONE (OUTPATIENT)
Dept: VASCULAR LAB | Facility: MEDICAL CENTER | Age: 65
End: 2025-07-31
Payer: COMMERCIAL

## 2025-07-31 PROCEDURE — RXMED WILLOW AMBULATORY MEDICATION CHARGE: Performed by: NURSE PRACTITIONER

## 2025-07-31 RX ORDER — SEMAGLUTIDE 0.68 MG/ML
0.25 INJECTION, SOLUTION SUBCUTANEOUS
Qty: 3 ML | Refills: 1 | Status: SHIPPED | OUTPATIENT
Start: 2025-07-31

## 2025-07-31 NOTE — TELEPHONE ENCOUNTER
Called Abdirizak @ [phone] and s/w [place] to verify if pt is eligible to receive any medications related for weight loss/gain management. At this time, she confirmed pt is excluded for any medication related with this diagnosis and if pt would like to push forward in requesting this for another medical diagnosis, such as MARY, pt's provider will need to submit straight appeal in order to re-consider pt for approval.     Notified and updated provider for the status.     ROGELIO Castillo, PhT  Vascular Pharmacy Liaison (Rx Coordinator)  P: [phone]  7/31/2025 4:42 PM

## 2025-07-31 NOTE — PROGRESS NOTES
Per discussion during clinic visit, pt amenable to Ozempic 340b pricing if weight loss medications are not covered by her insurance. Rx sent to [facility] pharmacy.    Juni Brock, EliasD, BCACP

## 2025-08-01 NOTE — TELEPHONE ENCOUNTER
Discussed with pt today about the recent PA denial status that was submitted for  Zepbound, and at this time, we are offering the specialty pricing discount available for other drug alternatives, Ozempic.     Pt would like to start with the 2 week therapy for the lowest dose, and wanting to increase it to 0.5mg dose after, which her provider's advise was it not medically necessary to be on 0.5mg during her 2nd week therapy with the medication, once she starts with it, in which she had verbally agreed.     Pt ok'ed with the pricing ofr $356.81 and is now scheduled for delivery via [facility] 08/04. Pt also mentioned that she will be starting the medication on the week of 08/18 as she will be gone out of town in the next 2 weekends.     Verified and confirmed pt's address, CC info and instructions to .       [facility] contact information was provided to pt via phone.    ROGELIO Castillo, PhT  [facility] Pharmacy Liaison (Rx Coordinator)  P: [phone]  7/31/2025 5:48 PM

## 2025-08-01 NOTE — TELEPHONE ENCOUNTER
Received New Start request via [facility]  for   Semaglutide,0.25 or 0.5MG/DOS, (OZEMPIC, 0.25 OR 0.5 MG/DOSE,) 2 MG/3ML Solution Pen-injector. (Quantity:3 mls, Day Supply:56)     Insurance: ADAIR   Member ID:  [id number]  BIN: [id number]  PCN: [id number]  Group: [id number]     Ran Test claim via [place] & medication Prior authorization required. Per provider's note, pt will be using the [facility] specialty pricing due to unaffordability.     ROGELIO Castillo, PhT  V[facility] Pharmacy Liaison (Rx Coordinator)  P: [phone]  7/31/2025 5:34 PM

## 2025-08-04 ENCOUNTER — PHARMACY VISIT (OUTPATIENT)
Dept: PHARMACY | Facility: MEDICAL CENTER | Age: 65
End: 2025-08-04
Payer: COMMERCIAL

## 2025-08-08 DIAGNOSIS — K62.5 BRBPR (BRIGHT RED BLOOD PER RECTUM): Primary | ICD-10-CM

## 2025-08-21 ENCOUNTER — TELEPHONE (OUTPATIENT)
Dept: VASCULAR LAB | Facility: MEDICAL CENTER | Age: 65
End: 2025-08-21
Payer: COMMERCIAL

## (undated) DEVICE — DRAPE LASER ARM CAMERA - 7 X 96 (25EA/BX)

## (undated) DEVICE — SODIUM CHL IRRIGATION 0.9% 1000ML (12EA/CA)

## (undated) DEVICE — TOWEL STOP TIMEOUT SAFETY FLAG (40EA/CA)

## (undated) DEVICE — SET LEADWIRE 5 LEAD BEDSIDE DISPOSABLE ECG (1SET OF 5/EA)

## (undated) DEVICE — SET EXTENSION WITH 2 PORTS (48EA/CA) ***PART #2C8610 IS A SUBSTITUTE*****

## (undated) DEVICE — SHAVER4.0 AGGRESSIVE + FORMLA (5EA/BX)

## (undated) DEVICE — TUBING CLEARLINK DUO-VENT - C-FLO (48EA/CA)

## (undated) DEVICE — TUBING IRR SET CSST SPK LN LL - (3EA/CA ORDER BY CASE!!!!)

## (undated) DEVICE — TOWELS CLOTH SURGICAL - (4/PK 20PK/CA)

## (undated) DEVICE — SENSOR OXIMETER ADULT SPO2 RD SET (20EA/BX)

## (undated) DEVICE — NEEDLE SAFETY 18 GA X 1 1/2 IN (100EA/BX)

## (undated) DEVICE — CANISTER SUCTION RIGID 16000 - CC OMNI-JUG (4EA/CA)

## (undated) DEVICE — SLEEVE, VASO, THIGH, MED

## (undated) DEVICE — SUTURE GENERAL

## (undated) DEVICE — PACK ARTHROSCOPY - (2EA//CA)

## (undated) DEVICE — DRESSING 3X8 ADAPTIC GAUZE - NON-ADHERING (36/PK 6PK/BX)

## (undated) DEVICE — STOCKINETTE IMPERVIOUS 12X48 - STERILELF (10/CA)"

## (undated) DEVICE — SUCTION INSTRUMENT YANKAUER BULBOUS TIP W/O VENT (50EA/CA)

## (undated) DEVICE — SYRINGE 30 ML LS (56/BX)

## (undated) DEVICE — BANDAGE ELASTIC 6 HONEYCOMB - 6X5YD LF (20/CA)"

## (undated) DEVICE — DRAPE LARGE 3 QUARTER - (20/CA)

## (undated) DEVICE — SODIUM CHL. IRRIGATION 0.9% 3000ML (4EA/CA 65CA/PF)

## (undated) DEVICE — PROBESUCTION  3.5MM 90IN SERFAS ACCELERATOR

## (undated) DEVICE — CANISTER SUCTION 3000ML MECHANICAL FILTER AUTO SHUTOFF MEDI-VAC NONSTERILE LF DISP  (40EA/CA)

## (undated) DEVICE — GOWN WARMING STANDARD FLEX - (30/CA)

## (undated) DEVICE — LACTATED RINGERS INJ 1000 ML - (14EA/CA 60CA/PF)

## (undated) DEVICE — SUTURE 3-0 ETHILON PS-1 (36PK/BX)

## (undated) DEVICE — DRAPE LOWER EXTREMETY - (6/CA)